# Patient Record
Sex: FEMALE | Race: WHITE | NOT HISPANIC OR LATINO | Employment: FULL TIME | URBAN - METROPOLITAN AREA
[De-identification: names, ages, dates, MRNs, and addresses within clinical notes are randomized per-mention and may not be internally consistent; named-entity substitution may affect disease eponyms.]

---

## 2018-08-30 ENCOUNTER — TRANSFERRED RECORDS (OUTPATIENT)
Dept: HEALTH INFORMATION MANAGEMENT | Facility: CLINIC | Age: 33
End: 2018-08-30

## 2018-08-30 LAB
HPV ABSTRACT: NORMAL
PAP-ABSTRACT: NORMAL

## 2018-12-05 ENCOUNTER — TRANSFERRED RECORDS (OUTPATIENT)
Dept: HEALTH INFORMATION MANAGEMENT | Facility: CLINIC | Age: 33
End: 2018-12-05

## 2018-12-05 LAB
ABO + RH BLD: NORMAL
ABO + RH BLD: NORMAL
BLD GP AB SCN SERPL QL: NEGATIVE
C TRACH DNA SPEC QL PROBE+SIG AMP: NEGATIVE
HBV SURFACE AG SERPL QL IA: NEGATIVE
HEMOGLOBIN: 12.6 G/DL (ref 11.7–15.7)
HIV 1+2 AB+HIV1 P24 AG SERPL QL IA: NEGATIVE
N GONORRHOEA DNA SPEC QL PROBE+SIG AMP: NEGATIVE
PLATELET # BLD AUTO: 290 10^9/L
RUBELLA ANTIBODY IGG QUANTITATIVE: NORMAL IU/ML
TREPONEMA ANTIBODIES: NEGATIVE

## 2019-04-01 ENCOUNTER — TELEPHONE (OUTPATIENT)
Dept: OBGYN | Facility: CLINIC | Age: 34
End: 2019-04-01

## 2019-04-01 DIAGNOSIS — Z36.9 ENCOUNTER FOR ANTENATAL SCREENING OF MOTHER: Primary | ICD-10-CM

## 2019-04-01 NOTE — TELEPHONE ENCOUNTER
Records received (in addition to the ones that have been scanned) in OB GYN, patient doesn't have any appointments scheduled will need to be accepted as a [place].

## 2019-04-08 ENCOUNTER — PRENATAL OFFICE VISIT (OUTPATIENT)
Dept: MIDWIFE SERVICES | Facility: CLINIC | Age: 34
End: 2019-04-08

## 2019-04-08 VITALS
WEIGHT: 176 LBS | SYSTOLIC BLOOD PRESSURE: 91 MMHG | DIASTOLIC BLOOD PRESSURE: 53 MMHG | HEIGHT: 68 IN | BODY MASS INDEX: 26.67 KG/M2 | HEART RATE: 66 BPM

## 2019-04-08 DIAGNOSIS — Z36.9 ENCOUNTER FOR ANTENATAL SCREENING OF MOTHER: ICD-10-CM

## 2019-04-08 DIAGNOSIS — Z34.02 ENCOUNTER FOR SUPERVISION OF NORMAL FIRST PREGNANCY IN SECOND TRIMESTER: ICD-10-CM

## 2019-04-08 DIAGNOSIS — Z34.02 ENCOUNTER FOR SUPERVISION OF NORMAL FIRST PREGNANCY IN SECOND TRIMESTER: Primary | ICD-10-CM

## 2019-04-08 PROBLEM — Z34.00 SUPERVISION OF NORMAL IUP (INTRAUTERINE PREGNANCY) IN PRIMIGRAVIDA: Status: ACTIVE | Noted: 2019-04-08

## 2019-04-08 LAB
ERYTHROCYTE [DISTWIDTH] IN BLOOD BY AUTOMATED COUNT: 13 % (ref 10–15)
GLUCOSE 1H P 50 G GLC PO SERPL-MCNC: 96 MG/DL (ref 60–129)
HCT VFR BLD AUTO: 33.7 % (ref 35–47)
HGB BLD-MCNC: 11.6 G/DL (ref 11.7–15.7)
MCH RBC QN AUTO: 33.5 PG (ref 26.5–33)
MCHC RBC AUTO-ENTMCNC: 34.4 G/DL (ref 31.5–36.5)
MCV RBC AUTO: 97 FL (ref 78–100)
PLATELET # BLD AUTO: 261 10E9/L (ref 150–450)
RBC # BLD AUTO: 3.46 10E12/L (ref 3.8–5.2)
WBC # BLD AUTO: 10.7 10E9/L (ref 4–11)

## 2019-04-08 PROCEDURE — 99207 ZZC FIRST OB VISIT: CPT | Performed by: ADVANCED PRACTICE MIDWIFE

## 2019-04-08 PROCEDURE — 36415 COLL VENOUS BLD VENIPUNCTURE: CPT | Performed by: ADVANCED PRACTICE MIDWIFE

## 2019-04-08 PROCEDURE — 85027 COMPLETE CBC AUTOMATED: CPT | Performed by: ADVANCED PRACTICE MIDWIFE

## 2019-04-08 PROCEDURE — 82950 GLUCOSE TEST: CPT | Performed by: ADVANCED PRACTICE MIDWIFE

## 2019-04-08 ASSESSMENT — ANXIETY QUESTIONNAIRES
IF YOU CHECKED OFF ANY PROBLEMS ON THIS QUESTIONNAIRE, HOW DIFFICULT HAVE THESE PROBLEMS MADE IT FOR YOU TO DO YOUR WORK, TAKE CARE OF THINGS AT HOME, OR GET ALONG WITH OTHER PEOPLE: NOT DIFFICULT AT ALL
2. NOT BEING ABLE TO STOP OR CONTROL WORRYING: NOT AT ALL
GAD7 TOTAL SCORE: 2
1. FEELING NERVOUS, ANXIOUS, OR ON EDGE: NOT AT ALL
6. BECOMING EASILY ANNOYED OR IRRITABLE: SEVERAL DAYS
5. BEING SO RESTLESS THAT IT IS HARD TO SIT STILL: NOT AT ALL
3. WORRYING TOO MUCH ABOUT DIFFERENT THINGS: NOT AT ALL
7. FEELING AFRAID AS IF SOMETHING AWFUL MIGHT HAPPEN: NOT AT ALL

## 2019-04-08 ASSESSMENT — MIFFLIN-ST. JEOR: SCORE: 1553.89

## 2019-04-08 ASSESSMENT — PATIENT HEALTH QUESTIONNAIRE - PHQ9
SUM OF ALL RESPONSES TO PHQ QUESTIONS 1-9: 2
5. POOR APPETITE OR OVEREATING: SEVERAL DAYS

## 2019-04-08 NOTE — NURSING NOTE
Please abstract the following data from this visit with this patient into the appropriate field in Epic:    Pap smear and HPV done on this date: 8/30/2019 (approximately), by this group: [facility] via [facility] Everywhere, results were NIL and negative.

## 2019-04-08 NOTE — PATIENT INSTRUCTIONS
Thank you for coming to see the Midwives at the   [facility]!      We will notify you about your labs that were drawn today once we get the results back.  If you have Avalarahart your lab results will be posted there.      Someone from the clinic will call you personally or send you a Acquaintable message with your results.      If you need any refills of medications please call your pharmacy and they will contact us.      If you have a medical emergency please call 911.      If you have any concerns about today's visit, wish to schedule another appointment, or have an urgent medical concern please call our office at [phone]. You can also make appointments through Acquaintable.      After hours you may also call the clinic number above to be connected with [facility] after hours triage nurse.  The nurse can page the midwife on call if needed. There is always a midwife on call 24 hours a day.    Prenatal Care Recommendations:  24-27 weeks: One hour diabetes test (GCT) and complete blood count       This test helps identify diabetes of pregnancy or gestational diabetes.  We also look   at the iron in your blood and how well your blood clots.    28 - 36 weeks: Tetanus shot (Tdap)       This shot helps protect you and your baby from whooping cough.    36 weeks and later: Group B Strep test (GBS)       This test helps predict if you need antibiotics in labor to prevent infection for your baby.    Anytime September to April:  Flu shot       This shot helps protect you and your family from the flu.  This is especially important during pregnancy.        The typical schedule after your first visit today you can expect:     Visit 2 - 12-16 weeks  Visit 3 - 20 weeks  Visit 4 - 24 weeks  Visit 5 - 28 weeks  Visit 6 - 30 weeks  Visit 7 - 32 weeks  Visit 8 - 34 weeks  Visit 9 - 36 weeks  Weekly after 36 weeks until delivery.        Any time during or after your pregnancy you may experience increased depression and/or mood  changes.    We are here to support you. Please contact us if you are:    Feeling anxious    Overwhelmed or sad     Trouble sleeping    Crying uncontrollably    Trouble caring for yourself or baby.    Any thoughts of hurting yourself, your baby, or anyone else    If anything comes up between your visits or you have concerns please don't hesitate to contact us.    Secure access to your medical record:  Use Shopeart (secure email communication and access to your chart) to send your primary care provider a message or make an appointment. Ask someone on your Team how to sign up for Altheos. To log on to Dennoo or for more information in Altheos please visit the website at [email or web address].       Certified Nurse Midwife (CNM) Team    DONY Forrester, DONY Pineda, LITZY, CNM, NP-BC  Jelly Rawls, PJ, APRN, CNM      Again, thank you for choosing the midwives at [facility].  We are excited to be a part of your pregnancy. Please let us know how we can best partner with you to improve your and your family's health.    Fetal Kick Counts    It is important to know when your baby's movements occur. We often get busy with work and life and do not pay close attention to their movements.        Women typically begin feeling movement between 18-22 weeks of gestation, sometimes it can be earlier or later depending on where your placenta is       Movements usually begin feeling like popping or fluttering and as the baby grows they become more pronounced    Toward the end of pregnancy as the baby gets larger they may not move as much or make as big of movements. Babies have maturing sleep cycles as well as not as much room to move and flip. If you are ever concerned about your baby's movements or have not felt the baby move for a while, we recommend you do a fetal kick count. Prior to starting your count drink a glass of water or juice and eat a snack. Then lay down on  "your side and begin to count movements.       PREECLAMPSIA SIGNS AND SYMPTOMS    Preeclampsia is a dangerous condition that some women develop in the second half of pregnancy. It can also begin after the baby is born.  Preeclampsia causes high blood pressure and can cause problems with many organ systems in your body.  It can also affect the growth of your baby. The exact cause of preeclampsia is unknown, however, there are signs and symptoms to watch for:    -A bad headache that doesn't improve with Tylenol  -Visual changes such as spots, flashes of light, blurry vision  -Pain in the upper right part of your abdomen, especially under the ribs that doesn't go away  -Nausea and/or vomiting  -Feeling extremely tired  -Yellowing of the skin and/or eyes  -Feeling \"not quite right\" or that something is wrong  -An extreme amount of swelling (some swelling in pregnancy is very normal)    If your midwife feels that you are developing preeclampsia, you will have lab tests drawn and will be monitored very closely.     If you are experiencing anyof these symptoms, call the [facility] immediately at [phone].    "

## 2019-04-08 NOTE — PROGRESS NOTES
SUBJECTIVE:     HPI:    This is a 33 year old female patient,  who presents for her first obstetrical visit. Was seen at [facility] but is looking for a more personalized experience. Is not interested in waterbirth. She presents alone to the clinic today and in good health. She denies any questions or concerns at this time.     LIBERTAD: 2019, by Ultrasound.  She is 24w5d weeks.  Her cycles are regular.  Her last menstrual period was normal.   Since her LMP, she has experienced  nausea and Diarrhea).   She denies emesis, abdominal pain, fatigue, headache, loss of appetite, vaginal discharge, dysuria, pelvic pain, urinary urgency, lightheadedness, urinary frequency, vaginal bleeding, hemorrhoids and constipation.    Additional History: Doing CBE at [facility] for a 5 week course, already has breast pump, and did a tour of "[employer].    Have you travelled during the pregnancy?Yes, If yes, where? [place] in 2018 - Low risk area; wore a lot of bug spray  Have your sexual partner(s) travelled during the pregnancy?Yes, If yes, where? [facility] in     HISTORY:   Planned Pregnancy: Yes  Marital Status: Single  Occupation: Retail-  Living in Household: Spouse    Past History:  Her past medical history   Past Medical History:   Diagnosis Date     Depressive disorder     no meds   .      She has a history of  First pregnancy    Since her last LMP she denies use of alcohol, tobacco and street drugs.    Past medical, surgical, social and family history were reviewed and updated in [employer].        Current Outpatient Medications   Medication     Prenatal Multivit-Min-Fe-FA (PRENATAL VITAMINS PO)     UNKNOWN MED DOSAGE     No current facility-administered medications for this visit.        ROS:   12 point review of systems negative other than symptoms noted below.  Gastrointestinal: Diarrhea and Nausea      OBJECTIVE:     EXAM:  BP 91/53 (BP Location: Left arm, Patient  "Position: Sitting, Cuff Size: Adult Regular)   Pulse 66   Ht 1.715 m (5' 7.5\")   Wt 79.8 kg (176 lb)   LMP 2018   BMI 27.16 kg/m   Body mass index is 27.16 kg/m .    GENERAL: healthy, alert and no distress  EYES: Eyes grossly normal to inspection, PERRL and conjunctivae and sclerae normal  NECK: no adenopathy, no asymmetry, masses, or scars and thyroid normal to palpation  RESP: lungs clear to auscultation - no rales, rhonchi or wheezes  CV: regular rate and rhythm, normal S1 S2, no S3 or S4, no murmur, click or rub, no peripheral edema and peripheral pulses strong  MS: no gross musculoskeletal defects noted, no edema  SKIN: no suspicious lesions or rashes  NEURO: Normal strength and tone, mentation intact and speech normal  PSYCH: mentation appears normal, affect normal/bright    ASSESSMENT/PLAN:       ICD-10-CM    1. Encounter for supervision of normal first pregnancy in second trimester Z34.02        33 year old , 24w5d weeks of pregnancy with LIBERTAD of 2019, by Ultrasound    Discussed as follows:Childbirth classes already planned at [facility], tour completed at [facility],  plans on breastfeeding and has pump already, plan for Tdap next visit    Genetic Testing reviewed-Completed at [facility] .     COUNSELING    Instructed on use of triage nurse line and contacting the on call CNM after hours in an emergency.     Reviewed CNM philosophy, call schedule for labor and delivery, and FSH for delivery    1st OB handout given outlining appointment spacing and CNM information    Reviewed exercise and nutrition    Recommend to gain 25-35 pounds with her pregnancy.    Discussed OTC medications. OB med list given    Travel precautions discussed, no air travel after 36 weeks and Zika Virus discussed    GCT and CBC done today and WNL    Tdap next visit    Will return to the clinic in 4 weeks for her next routine prenatal check.  Will call to be seen sooner if problems " arise.      LITZY Fisher CNM    Important Information for Provider: Transfer of care at 24 weeks.     Prenatal OB Questionnaire      Allergies as of 4/8/2019:    Allergies as of 04/08/2019 - Reviewed 04/08/2019   Allergen Reaction Noted     Amoxicillin  03/01/2011       Current medications are:  Current Outpatient Medications   Medication Sig Dispense Refill     Prenatal Multivit-Min-Fe-FA (PRENATAL VITAMINS PO) Take 1 tablet by mouth       UNKNOWN MED DOSAGE BCP           Early ultrasound screening tool:    Does patient have irregular periods?  N/A  Did patient use hormonal birth control in the three months prior to positive urine pregnancy test? N/A  Is the patient breastfeeding?  N/A  Is the patient 10 weeks or greater at time of education visit?  Yes    Transfer of Care at 24w5d

## 2019-04-09 ASSESSMENT — ANXIETY QUESTIONNAIRES: GAD7 TOTAL SCORE: 2

## 2019-05-08 ENCOUNTER — PRENATAL OFFICE VISIT (OUTPATIENT)
Dept: MIDWIFE SERVICES | Facility: CLINIC | Age: 34
End: 2019-05-08
Payer: COMMERCIAL

## 2019-05-08 VITALS — BODY MASS INDEX: 27.96 KG/M2 | WEIGHT: 181.2 LBS | SYSTOLIC BLOOD PRESSURE: 112 MMHG | DIASTOLIC BLOOD PRESSURE: 78 MMHG

## 2019-05-08 DIAGNOSIS — Z23 NEED FOR TDAP VACCINATION: Primary | ICD-10-CM

## 2019-05-08 DIAGNOSIS — Z34.03 ENCOUNTER FOR SUPERVISION OF NORMAL FIRST PREGNANCY IN THIRD TRIMESTER: ICD-10-CM

## 2019-05-08 PROCEDURE — 99207 ZZC PRENATAL VISIT: CPT | Performed by: ADVANCED PRACTICE MIDWIFE

## 2019-05-08 PROCEDURE — 90715 TDAP VACCINE 7 YRS/> IM: CPT | Performed by: ADVANCED PRACTICE MIDWIFE

## 2019-05-08 PROCEDURE — 90471 IMMUNIZATION ADMIN: CPT | Performed by: ADVANCED PRACTICE MIDWIFE

## 2019-05-08 NOTE — PROGRESS NOTES
Feels well today. Here with Eddie and her mother.   Fetal movement: positive, denies loss of fluid/vb/contractions  Having lower back pain and hip pain. Reviewed exercises/ stretching. If it persists we can rx a belt.   Tdap given: Yes  Rhogam: n/a O+  Hep C drawn: No  Water birth consent signed: Not interested    Reviewed PTL precautions and S&S of PIH, patient verbalizes understanding and what to report  Hospital Registration reminder-online  Return to clinic 2 weeks    Jelly Rawls, PJ, APRN, CNM

## 2019-05-08 NOTE — PATIENT INSTRUCTIONS
"PREECLAMPSIA SIGNS AND SYMPTOMS    Preeclampsia is a dangerous condition that some women develop in the second half of pregnancy. It can also begin after the baby is born.  Preeclampsia causes high blood pressure and can cause problems with many organ systems in your body.  It can also affect the growth of your baby. The exact cause of preeclampsia is unknown, however, there are signs and symptoms to watch for:    -A bad headache that doesn't improve with Tylenol  -Visual changes such as spots, flashes of light, blurry vision  -Pain in the upper right part of your abdomen, especially under the ribs that doesn't go away  -Nausea and/or vomiting  -Feeling extremely tired  -Yellowing of the skin and/or eyes  -Feeling \"not quite right\" or that something is wrong  -An extreme amount of swelling (some swelling in pregnancy is very normal)    If your midwife feels that you are developing preeclampsia, you will have lab tests drawn and will be monitored very closely.     If you are experiencing anyof these symptoms, call the [facility] immediately at [phone].    SIGNS OF  LABOR    Labor is  if it happens more than three weeks before your due date.    It can be hard to know if you are in labor, since the symptoms can be like the normal feelings of pregnancy.  Often, the only difference is the symptoms increase or they don't go away.     Signs of  labor can include:      Contractions which can feel like period cramps or gas pain.  You may feel it in the lower part of your abdomen, in your back, or as a pressure feeling in your bottom.  It is often regular, coming every 5 or 10 minutes, and  lasting about 30-60 seconds. Some contractions are normal during pregnancy (Evan beltrán contractions) but if you are feeling more than 5-6 in one hour that is NOT normal    If this occurs empty your bladder, then drink 2-3 glasses of water, eat a snack, and lay down on your left side. Put your " hand on your abdomen to count the contractions.  If after one hour of resting you have still had 5-6 contractions call your clinic right away.      If you feel a pop, gush, or trickle of fluid it may mean that your bag of water has broken and you should contact the clinic       You may also experience loose stools and/or rectal pressure       Listen to your body, if something doesn't seem right please call us at the clinic    Risk Factors      Previous  delivery    Bacterial Vaginosis- if you notice a fishy smell to your discharge or experience vaginal itching/discomfort you should be evaluated for infection    Smoking    Drug abuse    Adolescent (teen) pregnancy or advanced maternal age (AMA) age 35 and over    Dehydration (this may not cause  labor but it can cause contractions)    If you think you are in  labor we may do some lab testing in the clinic or send you to the hospital for evaluation    Please call us if you are concerned you are in  labor.    [facility]  [phone]

## 2019-05-17 ENCOUNTER — TELEPHONE (OUTPATIENT)
Dept: OBGYN | Facility: CLINIC | Age: 34
End: 2019-05-17

## 2019-05-17 NOTE — TELEPHONE ENCOUNTER
30w2d, LIBERTAD 9/26/18. Pt started seeing stars today for about 30 sec. No further vision changes. Pt is feeling well. Pt has been having HA on and off. No blurred vision, epigastric pain. No nausea. Pt states FM is present but less the last two days. Has felt movement.  Pt denies contractions. Pt BP in clinic 112/78 and 91/53. Reviewed with Geovanna Sandy. Pt to monitor for now and call back if happens again. Reviewed Preeclampsia signs and symptoms with pt. Pt will come to the clinic Monday for BP check.

## 2019-05-20 ENCOUNTER — ALLIED HEALTH/NURSE VISIT (OUTPATIENT)
Dept: NURSING | Facility: CLINIC | Age: 34
End: 2019-05-20
Payer: COMMERCIAL

## 2019-05-20 VITALS — DIASTOLIC BLOOD PRESSURE: 62 MMHG | HEART RATE: 73 BPM | SYSTOLIC BLOOD PRESSURE: 102 MMHG

## 2019-05-20 PROCEDURE — 99211 OFF/OP EST MAY X REQ PHY/QHP: CPT

## 2019-05-20 NOTE — NURSING NOTE
30w5d. Pt called with vision change last Friday. Saw stars for 30 sec. Pt here for BP check. /63, pulse 73. Denies any further vision changes. Denies HA, epigastric pain or dizziness. No edema in legs or feet. States left leg does swell by end of work day.  FM present. Denies contractions, vag bleeding, or LOF. Reviewed signs and symptoms of pre-eclampsia and when to call.Pt had no questions. Discharged to home.   Next OB appt 5/23/19.

## 2019-05-23 ENCOUNTER — PRENATAL OFFICE VISIT (OUTPATIENT)
Dept: MIDWIFE SERVICES | Facility: CLINIC | Age: 34
End: 2019-05-23
Payer: COMMERCIAL

## 2019-05-23 VITALS — BODY MASS INDEX: 28.24 KG/M2 | DIASTOLIC BLOOD PRESSURE: 78 MMHG | WEIGHT: 183 LBS | SYSTOLIC BLOOD PRESSURE: 116 MMHG

## 2019-05-23 DIAGNOSIS — Z34.03 ENCOUNTER FOR SUPERVISION OF NORMAL FIRST PREGNANCY IN THIRD TRIMESTER: Primary | ICD-10-CM

## 2019-05-23 PROCEDURE — 99207 ZZC PRENATAL VISIT: CPT | Performed by: ADVANCED PRACTICE MIDWIFE

## 2019-05-23 NOTE — PATIENT INSTRUCTIONS

## 2019-05-23 NOTE — PROGRESS NOTES
Feels well overall. Here alone today.  Fetal Movement: positive, denies loss of fluid/vb, no contractions  Fetal kick counts/movement reviewed  Reviewed PTL precautions and s/sx of preeclampsia; denies any S&S and aware of what to report     Peds chosen: Yes - in [place]    Plans to breastfeed Yes  Breastfeeding class planned combined with a birthing class. Also she is friends with a lactation consultant.     Return to clinic 2 weeks    Jelly Rawls, PJ, APRN, CNM

## 2019-06-06 ENCOUNTER — PRENATAL OFFICE VISIT (OUTPATIENT)
Dept: MIDWIFE SERVICES | Facility: CLINIC | Age: 34
End: 2019-06-06
Payer: COMMERCIAL

## 2019-06-06 VITALS — BODY MASS INDEX: 27.78 KG/M2 | SYSTOLIC BLOOD PRESSURE: 94 MMHG | WEIGHT: 180 LBS | DIASTOLIC BLOOD PRESSURE: 64 MMHG

## 2019-06-06 DIAGNOSIS — Z34.03 ENCOUNTER FOR SUPERVISION OF NORMAL FIRST PREGNANCY IN THIRD TRIMESTER: ICD-10-CM

## 2019-06-06 PROCEDURE — 99207 ZZC PRENATAL VISIT: CPT | Performed by: NURSE PRACTITIONER

## 2019-06-06 NOTE — PATIENT INSTRUCTIONS

## 2019-06-06 NOTE — PROGRESS NOTES
Feels well.  No concerns at this time.   Fetal Movement: positive, denies loss of fluid/vb, no contractions  Fetal kick counts/movement reviewed  Reviewed PTL precautions and s/sx of preeclampsia; denies any S&S and aware of what to report     Peds chosen: Not asked    Plans to breastfeed Yes    Return to clinic 2 weeks  **ask about peds provider at next visit    Janis MCGHEE CNM

## 2019-06-19 ENCOUNTER — PRENATAL OFFICE VISIT (OUTPATIENT)
Dept: MIDWIFE SERVICES | Facility: CLINIC | Age: 34
End: 2019-06-19
Payer: COMMERCIAL

## 2019-06-19 VITALS — SYSTOLIC BLOOD PRESSURE: 108 MMHG | BODY MASS INDEX: 29.32 KG/M2 | WEIGHT: 190 LBS | DIASTOLIC BLOOD PRESSURE: 72 MMHG

## 2019-06-19 DIAGNOSIS — Z34.03 ENCOUNTER FOR SUPERVISION OF NORMAL FIRST PREGNANCY IN THIRD TRIMESTER: Primary | ICD-10-CM

## 2019-06-19 PROCEDURE — 99207 ZZC PRENATAL VISIT: CPT | Performed by: ADVANCED PRACTICE MIDWIFE

## 2019-06-19 NOTE — PATIENT INSTRUCTIONS
"PREECLAMPSIA SIGNS AND SYMPTOMS    Preeclampsia is a dangerous condition that some women develop in the second half of pregnancy. It can also begin after the baby is born.  Preeclampsia causes high blood pressure and can cause problems with many organ systems in your body.  It can also affect the growth of your baby. The exact cause of preeclampsia is unknown, however, there are signs and symptoms to watch for:    -A bad headache that doesn't improve with Tylenol  -Visual changes such as spots, flashes of light, blurry vision  -Pain in the upper right part of your abdomen, especially under the ribs that doesn't go away  -Nausea and/or vomiting  -Feeling extremely tired  -Yellowing of the skin and/or eyes  -Feeling \"not quite right\" or that something is wrong  -An extreme amount of swelling (some swelling in pregnancy is very normal)    If your midwife feels that you are developing preeclampsia, you will have lab tests drawn and will be monitored very closely.     If you are experiencing anyof these symptoms, call the [facility] immediately at [phone].    Fetal Kick Counts    It is important to know when your baby's movements occur. We often get busy with work and life and do not pay close attention to their movements.        Women typically begin feeling movement between 18-22 weeks of gestation, sometimes it can be earlier or later depending on where your placenta is       Movements usually begin feeling like popping or fluttering and as the baby grows they become more pronounced    Toward the end of pregnancy as the baby gets larger they may not move as much or make as big of movements. Babies have maturing sleep cycles as well as not as much room to move and flip. If you are ever concerned about your baby's movements or have not felt the baby move for a while, we recommend you do a fetal kick count. Prior to starting your count drink a glass of water or juice and eat a snack. Then lay down on " your side and begin to count movements.     How to do a Fetal Kick Counts    There are many different ways to monitor your baby's movements. Movements can range from large jabs to small kicks, or wiggles.  Hiccups count!      Count 10 movements in 2 hours when resting and focusing    Count 10 movements in 12 hours when doing normal activity    We recommend that if movements occur but seem decreased that you should be seen in the clinic or hospital for evaluation within 12 hours. If fetal movement is absent or fetal kick counts are low please contact us right away.    If you ever have any concerns about your baby's movements DO NOT HESITATE to call us, we are here for you!    [facility]  [phone]    SIGNS OF  LABOR    Labor is  if it happens more than three weeks before your due date.    It can be hard to know if you are in labor, since the symptoms can be like the normal feelings of pregnancy.  Often, the only difference is the symptoms increase or they don't go away.     Signs of  labor can include:      Contractions which can feel like period cramps or gas pain.  You may feel it in the lower part of your abdomen, in your back, or as a pressure feeling in your bottom.  It is often regular, coming every 5 or 10 minutes, and  lasting about 30-60 seconds. Some contractions are normal during pregnancy ([place] beltrán contractions) but if you are feeling more than 5-6 in one hour that is NOT normal    If this occurs empty your bladder, then drink 2-3 glasses of water, eat a snack, and lay down on your left side. Put your hand on your abdomen to count the contractions.  If after one hour of resting you have still had 5-6 contractions call your clinic right away.      If you feel a pop, gush, or trickle of fluid it may mean that your bag of water has broken and you should contact the clinic       You may also experience loose stools and/or rectal pressure       Listen to your body,  if something doesn't seem right please call us at the clinic    Risk Factors      Previous  delivery    Bacterial Vaginosis- if you notice a fishy smell to your discharge or experience vaginal itching/discomfort you should be evaluated for infection    Smoking    Drug abuse    Adolescent (teen) pregnancy or advanced maternal age (AMA) age 35 and over    Dehydration (this may not cause  labor but it can cause contractions)    If you think you are in  labor we may do some lab testing in the clinic or send you to the hospital for evaluation    Please call us if you are concerned you are in  labor.    [facility]  [phone]    GROUP B STREP    Group B Strep (GBS) is a common bacteria that is sometimes found in the vagina, urinary tract or rectum.  It is not harmful typically to adults but can cause serious illness in newborns.  It occasionally is passed from mother to baby during birth.   It is important to test in pregnancy.  When a woman is found to be positive for GBS, either at the first prenatal visit or by taking a culture at 36 weeks, treatment will be offered to reduce the chance of spreading the bacteria to the baby.       Treatment consists of either oral antibiotics early in pregnancy or antibiotics given by IV during labor if testing is positive at 36 weeks.      Even without treatment the baby rarely (1-2% of the time) gets infected.  With treatment the baby almost never gets infected.       There really isn't anything you can do to keep from getting or being positive for GBS.  It isn't sexually transmitted and there are no symptoms if you are positive.       Your midwife will discuss your results with you and make recommendations for treatment.                                                   Infant Car Seat Safety  [place] law requires that all infants must be secured into a car seat while in a car.   General Car Seat Guidelines:    Your car seat should be  no more than six years old and should never have been in an accident.  Always know the history of your car seat.    Car seats should be installed in the back seat and should be rear-facing until the child is at least one year old (two years old, preferably), or at least 20 pounds. Follow your car seat 's instructions.    The straps on the car seat should be snug enough that you should not be able to pinch up slack with your fingers.    Never dress your baby in thick clothing, coats or blankets while riding in a car seat.  Secure the baby into the seat wearing clothing of normal thickness and then cover baby s lap with a blanket OVER the car seat straps, if needed.  Car Seat Clinics:    Schedule an appointment to have a professional check the installation of your car seat before your baby is born.  Go to:   https://dps.[place].gov/divisions/ots/child-passenger-safety/Pages/car-seat-checks.aspx         and click on your county.    Sign up for email alerts on child restraint/car seat recalls at:     http://www-cara.nhtsa.dot.gov/subscriptions/index.cfm     Find more information on car seat safety at:     http://www.safercar.gov/parents/CarSeats/Car-Seat-Safety.htm

## 2019-06-25 ENCOUNTER — PRENATAL OFFICE VISIT (OUTPATIENT)
Dept: MIDWIFE SERVICES | Facility: CLINIC | Age: 34
End: 2019-06-25
Payer: COMMERCIAL

## 2019-06-25 VITALS — WEIGHT: 189.4 LBS | BODY MASS INDEX: 29.23 KG/M2 | DIASTOLIC BLOOD PRESSURE: 56 MMHG | SYSTOLIC BLOOD PRESSURE: 110 MMHG

## 2019-06-25 DIAGNOSIS — Z34.03 ENCOUNTER FOR SUPERVISION OF NORMAL FIRST PREGNANCY IN THIRD TRIMESTER: Primary | ICD-10-CM

## 2019-06-25 DIAGNOSIS — Z36.85 SCREENING, ANTENATAL, FOR STREPTOCOCCUS B: ICD-10-CM

## 2019-06-25 DIAGNOSIS — H53.9 VISUAL DISTURBANCES: ICD-10-CM

## 2019-06-25 LAB
CREAT UR-MCNC: 74 MG/DL
ERYTHROCYTE [DISTWIDTH] IN BLOOD BY AUTOMATED COUNT: 12.8 % (ref 10–15)
HCT VFR BLD AUTO: 37.2 % (ref 35–47)
HGB BLD-MCNC: 12.7 G/DL (ref 11.7–15.7)
MCH RBC QN AUTO: 33.3 PG (ref 26.5–33)
MCHC RBC AUTO-ENTMCNC: 34.1 G/DL (ref 31.5–36.5)
MCV RBC AUTO: 98 FL (ref 78–100)
PLATELET # BLD AUTO: 237 10E9/L (ref 150–450)
PROT UR-MCNC: 0.12 G/L
PROT/CREAT 24H UR: 0.16 G/G CR (ref 0–0.2)
RBC # BLD AUTO: 3.81 10E12/L (ref 3.8–5.2)
WBC # BLD AUTO: 14.6 10E9/L (ref 4–11)

## 2019-06-25 PROCEDURE — 36415 COLL VENOUS BLD VENIPUNCTURE: CPT | Performed by: ADVANCED PRACTICE MIDWIFE

## 2019-06-25 PROCEDURE — 80053 COMPREHEN METABOLIC PANEL: CPT | Performed by: ADVANCED PRACTICE MIDWIFE

## 2019-06-25 PROCEDURE — 87653 STREP B DNA AMP PROBE: CPT | Performed by: ADVANCED PRACTICE MIDWIFE

## 2019-06-25 PROCEDURE — 85027 COMPLETE CBC AUTOMATED: CPT | Performed by: ADVANCED PRACTICE MIDWIFE

## 2019-06-25 PROCEDURE — 84156 ASSAY OF PROTEIN URINE: CPT | Performed by: ADVANCED PRACTICE MIDWIFE

## 2019-06-25 PROCEDURE — 99207 ZZC PRENATAL VISIT: CPT | Performed by: ADVANCED PRACTICE MIDWIFE

## 2019-06-25 NOTE — PATIENT INSTRUCTIONS
"Labor Instructions for Midwife Patients    When to call:  Both during and after office hours call  [phone]. There is a triage RN to take your calls and answer your questions 24 hours a day.  If she cannot answer your question she will page the midwife on call for you.    When to call:  Call anytime you have important concerns about you or your baby.     Call if:    You are having contractions at regular intervals about 5-6 minutes apart lasting 30-60 seconds and becoming increasingly more intense     You have an uncontrollable gush of fluid from your vagina or feel a pop and gush like your water has broken    You have HEAVY bleeding, like heavy period, blood running down your legs, or  soaking a pad.     Some bleeding after a pelvic exam, after intercourse, or in labor when your cervix is dilating is normal and is referred to as \"bloody show\"    You have severe, continuous back or abdominal pain    You feel it is time to go to the hospital    If this is your first labor, call when contractions are very intense and have been about every 3-4 minutes for about an hour    If it is your second labor or more, call when contractions are strong and about every 3-5 minutes or sooner depending on your level of discomfort.     Keep in mind we are always here for you! If you have questions, concerns please don't hesitate to call us.     What to eat/drink in labor: Drink plenty of fluid (water most importantly, juice, soda or tea without caffeine). Eat rice, pasta, soup, cereal, bread/toast, and fruit. Avoid dairy and greasy food as they are difficult to digest and you may experience some nausea during labor.    Comfort measures:    Baths and showers (ok even with ruptured membranes, it may temporarily slow contractions if you are still in the early stage of labor)    Warm/hot packs for back pain or discomfort    Back, belly, or thigh massages    Standing, rocking, walking, leaning over bed or tables, side-lying and " sleeping    Miscellaneous:     Contractions are timed from the beginning of one to the beginning of the next    Try hard to sleep during the early stage of labor when you are not that uncomfortable. Timing of contractions at this point is not important    Even if you cannot sleep, resting in bed or on the couch can help you maintain your energy for labor    When you arrive at the hospital the nurse will check your baby's heartbeat, check your cervix, and will call us. The midwife on call will come in and be with you when you are in active labor    After hours you need to enter the hospital through the emergency room    Fetal Kick Counts    It is important to know when your baby's movements occur. We often get busy with work and life and do not pay close attention to their movements.        Women typically begin feeling movement between 18-22 weeks of gestation, sometimes it can be earlier or later depending on where your placenta is       Movements usually begin feeling like popping or fluttering and as the baby grows they become more pronounced    Toward the end of pregnancy as the baby gets larger they may not move as much or make as big of movements. Babies have maturing sleep cycles as well as not as much room to move and flip. If you are ever concerned about your baby's movements or have not felt the baby move for a while, we recommend you do a fetal kick count. Prior to starting your count drink a glass of water or juice and eat a snack. Then lay down on your side and begin to count movements.     How to do a Fetal Kick Counts    There are many different ways to monitor your baby's movements. Movements can range from large jabs to small kicks, or wiggles.  Hiccups count!      Count 10 movements in 2 hours when resting and focusing    Count 10 movements in 12 hours when doing normal activity    We recommend that if movements occur but seem decreased that you should be seen in the clinic or hospital for  "evaluation within 12 hours. If fetal movement is absent or fetal kick counts are low please contact us right away.    If you ever have any concerns about your baby's movements DO NOT HESITATE to call us, we are here for you!    [facility]  [phone]        PREECLAMPSIA SIGNS AND SYMPTOMS    Preeclampsia is a dangerous condition that some women develop in the second half of pregnancy. It can also begin after the baby is born.  Preeclampsia causes high blood pressure and can cause problems with many organ systems in your body.  It can also affect the growth of your baby. The exact cause of preeclampsia is unknown, however, there are signs and symptoms to watch for:    -A bad headache that doesn't improve with Tylenol  -Visual changes such as spots, flashes of light, blurry vision  -Pain in the upper right part of your abdomen, especially under the ribs that doesn't go away  -Nausea and/or vomiting  -Feeling extremely tired  -Yellowing of the skin and/or eyes  -Feeling \"not quite right\" or that something is wrong  -An extreme amount of swelling (some swelling in pregnancy is very normal)    If your midwife feels that you are developing preeclampsia, you will have lab tests drawn and will be monitored very closely.     If you are experiencing anyof these symptoms, call the [facility] immediately at [phone].    "

## 2019-06-25 NOTE — PROGRESS NOTES
Feels well overall. Here alone today.   Fetal movement: positive  Denies bleeding/lof, some irregular contractions  GBS swab done today  Bedside ultrasound confirmed vertex position.  Labor instructions given  Labor preferences/birth plan reviewed: she is assuming she will use pain medication. We discussed early labor and when to call.     Warning signs reviewed  Patient has had some floaters on one occasion and a time when she felt her vision was clouded at in the lower half, but was able to see in her upper hemisphere of vision. Light headaches have occurred that have not even needed Tylenol. We discussed reporting the vision when it occurs vs waiting to the prenatal appointment. Labs drawn today.    Return to clinic 1 week    Jelly Rawls, PJ, APRN, CNM

## 2019-06-26 ENCOUNTER — TELEPHONE (OUTPATIENT)
Dept: OBGYN | Facility: CLINIC | Age: 34
End: 2019-06-26

## 2019-06-26 LAB
ALBUMIN SERPL-MCNC: 3 G/DL (ref 3.4–5)
ALP SERPL-CCNC: 130 U/L (ref 40–150)
ALT SERPL W P-5'-P-CCNC: 25 U/L (ref 0–50)
ANION GAP SERPL CALCULATED.3IONS-SCNC: 10 MMOL/L (ref 3–14)
AST SERPL W P-5'-P-CCNC: 21 U/L (ref 0–45)
BILIRUB SERPL-MCNC: 0.3 MG/DL (ref 0.2–1.3)
BUN SERPL-MCNC: 8 MG/DL (ref 7–30)
CALCIUM SERPL-MCNC: 8.8 MG/DL (ref 8.5–10.1)
CHLORIDE SERPL-SCNC: 106 MMOL/L (ref 94–109)
CO2 SERPL-SCNC: 21 MMOL/L (ref 20–32)
CREAT SERPL-MCNC: 0.7 MG/DL (ref 0.52–1.04)
GFR SERPL CREATININE-BSD FRML MDRD: >90 ML/MIN/{1.73_M2}
GLUCOSE SERPL-MCNC: 35 MG/DL (ref 70–99)
GP B STREP DNA SPEC QL NAA+PROBE: NEGATIVE
POTASSIUM SERPL-SCNC: 4.1 MMOL/L (ref 3.4–5.3)
PROT SERPL-MCNC: 6.6 G/DL (ref 6.8–8.8)
SODIUM SERPL-SCNC: 137 MMOL/L (ref 133–144)
SPECIMEN SOURCE: NORMAL

## 2019-06-26 NOTE — TELEPHONE ENCOUNTER
Left VM message for patient to call clinic for lab results.          [facility] called back, discussed lab results.  Labs negative for preeclampsia, GBS neg.  Discussed critical low glucose, she states that she had not had breakfast or anything to eat since the night before.  She denies vision changes, dizziness, any other concerning signs and symptoms.  Counseled on importance of eating meals at regular times and encouraged snacking in between if needed.  Counseled on signs and symptoms preeclampsia.      Verbalized understanding, all questions answered.

## 2019-07-02 ENCOUNTER — PRENATAL OFFICE VISIT (OUTPATIENT)
Dept: MIDWIFE SERVICES | Facility: CLINIC | Age: 34
End: 2019-07-02
Payer: COMMERCIAL

## 2019-07-02 VITALS — DIASTOLIC BLOOD PRESSURE: 64 MMHG | BODY MASS INDEX: 29.63 KG/M2 | WEIGHT: 192 LBS | SYSTOLIC BLOOD PRESSURE: 118 MMHG

## 2019-07-02 DIAGNOSIS — Z34.03 ENCOUNTER FOR SUPERVISION OF NORMAL FIRST PREGNANCY IN THIRD TRIMESTER: Primary | ICD-10-CM

## 2019-07-02 PROCEDURE — 99207 ZZC PRENATAL VISIT: CPT | Performed by: ADVANCED PRACTICE MIDWIFE

## 2019-07-02 NOTE — PROGRESS NOTES
Feels well overall. Here with Eddie.   Fetal movement: positive  Denies vaginal bleeding/lof, non-painful, irregular contractions  Warning signs reviewed  Labor signs and symptoms discussed, aware of numbers to call  Denies s/sx of preeclampsia and aware of what to report  Short term disability paperwork filled out and in my basket  [place]     Return to clinic 1 week    Jelly Rawls, PJ, APRN, CNM

## 2019-07-02 NOTE — PATIENT INSTRUCTIONS
"Labor Instructions for Midwife Patients    When to call:  Both during and after office hours call  [phone]. There is a triage RN to take your calls and answer your questions 24 hours a day.  If she cannot answer your question she will page the midwife on call for you.    When to call:  Call anytime you have important concerns about you or your baby.     Call if:    You are having contractions at regular intervals about 5-6 minutes apart lasting 30-60 seconds and becoming increasingly more intense     You have an uncontrollable gush of fluid from your vagina or feel a pop and gush like your water has broken    You have HEAVY bleeding, like heavy period, blood running down your legs, or  soaking a pad.     Some bleeding after a pelvic exam, after intercourse, or in labor when your cervix is dilating is normal and is referred to as \"bloody show\"    You have severe, continuous back or abdominal pain    You feel it is time to go to the hospital    If this is your first labor, call when contractions are very intense and have been about every 3-4 minutes for about an hour    If it is your second labor or more, call when contractions are strong and about every 3-5 minutes or sooner depending on your level of discomfort.     Keep in mind we are always here for you! If you have questions, concerns please don't hesitate to call us.     What to eat/drink in labor: Drink plenty of fluid (water most importantly, juice, soda or tea without caffeine). Eat rice, pasta, soup, cereal, bread/toast, and fruit. Avoid dairy and greasy food as they are difficult to digest and you may experience some nausea during labor.    Comfort measures:    Baths and showers (ok even with ruptured membranes, it may temporarily slow contractions if you are still in the early stage of labor)    Warm/hot packs for back pain or discomfort    Back, belly, or thigh massages    Standing, rocking, walking, leaning over bed or tables, side-lying and " "sleeping    Miscellaneous:     Contractions are timed from the beginning of one to the beginning of the next    Try hard to sleep during the early stage of labor when you are not that uncomfortable. Timing of contractions at this point is not important    Even if you cannot sleep, resting in bed or on the couch can help you maintain your energy for labor    When you arrive at the hospital the nurse will check your baby's heartbeat, check your cervix, and will call us. The midwife on call will come in and be with you when you are in active labor    After hours you need to enter the hospital through the emergency room    PREECLAMPSIA SIGNS AND SYMPTOMS    Preeclampsia is a dangerous condition that some women develop in the second half of pregnancy. It can also begin after the baby is born.  Preeclampsia causes high blood pressure and can cause problems with many organ systems in your body.  It can also affect the growth of your baby. The exact cause of preeclampsia is unknown, however, there are signs and symptoms to watch for:    -A bad headache that doesn't improve with Tylenol  -Visual changes such as spots, flashes of light, blurry vision  -Pain in the upper right part of your abdomen, especially under the ribs that doesn't go away  -Nausea and/or vomiting  -Feeling extremely tired  -Yellowing of the skin and/or eyes  -Feeling \"not quite right\" or that something is wrong  -An extreme amount of swelling (some swelling in pregnancy is very normal)    If your midwife feels that you are developing preeclampsia, you will have lab tests drawn and will be monitored very closely.     If you are experiencing anyof these symptoms, call the [facility] immediately at [phone].    NATURAL LABOR PREPARATION    So, you're getting closer and closer to your due date and wondering what you can do to help get your body ready for labor.   Here are some natural methods you can try:    NOTE: DO NOT begin any of the " following prior to 36 completed weeks of pregnancy!    Evening Primrose Oil: Take 1000mg by mouth three times per day. This encourages the cervix to ripen. Cervical ripening is when your cervix becomes more soft and stretchy to get ready for dilation and effacement.     Red Raspberry Leaf Tea: Red raspberry tea (get it at a Co-op or in the grocery store). Drink three cups per day (hot or cold). This can help to prepare the uterine muscles for labor.    Fetal Kick Counts    It is important to know when your baby's movements occur. We often get busy with work and life and do not pay close attention to their movements.        Women typically begin feeling movement between 18-22 weeks of gestation, sometimes it can be earlier or later depending on where your placenta is       Movements usually begin feeling like popping or fluttering and as the baby grows they become more pronounced    Toward the end of pregnancy as the baby gets larger they may not move as much or make as big of movements. Babies have maturing sleep cycles as well as not as much room to move and flip. If you are ever concerned about your baby's movements or have not felt the baby move for a while, we recommend you do a fetal kick count. Prior to starting your count drink a glass of water or juice and eat a snack. Then lay down on your side and begin to count movements.     How to do a Fetal Kick Counts    There are many different ways to monitor your baby's movements. Movements can range from large jabs to small kicks, or wiggles.  Hiccups count!      Count 10 movements in 2 hours when resting and focusing    Count 10 movements in 12 hours when doing normal activity    We recommend that if movements occur but seem decreased that you should be seen in the clinic or hospital for evaluation within 12 hours. If fetal movement is absent or fetal kick counts are low please contact us right away.    If you ever have any concerns about your baby's movements DO  NOT HESITATE to call us, we are here for you!    [facility]  [phone]

## 2019-07-08 NOTE — PROGRESS NOTES
Feels well  Fetal movement: positive  Denies vaginal bleeding/lof, no contractions  Warning signs reviewed   Labor signs and symptoms discussed, aware of numbers to call  Denies s/sx of pre-eclampsia and aware of what to report    Return to clinic 1 week    Geovanna MCGHEE CNM

## 2019-07-09 ENCOUNTER — PRENATAL OFFICE VISIT (OUTPATIENT)
Dept: MIDWIFE SERVICES | Facility: CLINIC | Age: 34
End: 2019-07-09
Payer: COMMERCIAL

## 2019-07-09 VITALS — BODY MASS INDEX: 30.09 KG/M2 | SYSTOLIC BLOOD PRESSURE: 112 MMHG | DIASTOLIC BLOOD PRESSURE: 54 MMHG | WEIGHT: 195 LBS

## 2019-07-09 DIAGNOSIS — Z34.03 ENCOUNTER FOR SUPERVISION OF NORMAL FIRST PREGNANCY IN THIRD TRIMESTER: Primary | ICD-10-CM

## 2019-07-09 PROCEDURE — 99207 ZZC PRENATAL VISIT: CPT | Performed by: ADVANCED PRACTICE MIDWIFE

## 2019-07-09 NOTE — PATIENT INSTRUCTIONS
"Labor Instructions for Midwife Patients    When to call:  Both during and after office hours call  [phone]. There is a triage RN to take your calls and answer your questions 24 hours a day.  If she cannot answer your question she will page the midwife on call for you.    When to call:  Call anytime you have important concerns about you or your baby.     Call if:    You are having contractions at regular intervals about 5-6 minutes apart lasting 30-60 seconds and becoming increasingly more intense     You have an uncontrollable gush of fluid from your vagina or feel a pop and gush like your water has broken    You have HEAVY bleeding, like heavy period, blood running down your legs, or  soaking a pad.     Some bleeding after a pelvic exam, after intercourse, or in labor when your cervix is dilating is normal and is referred to as \"bloody show\"    You have severe, continuous back or abdominal pain    You feel it is time to go to the hospital    If this is your first labor, call when contractions are very intense and have been about every 3-4 minutes for about an hour    If it is your second labor or more, call when contractions are strong and about every 3-5 minutes or sooner depending on your level of discomfort.     Keep in mind we are always here for you! If you have questions, concerns please don't hesitate to call us.     What to eat/drink in labor: Drink plenty of fluid (water most importantly, juice, soda or tea without caffeine). Eat rice, pasta, soup, cereal, bread/toast, and fruit. Avoid dairy and greasy food as they are difficult to digest and you may experience some nausea during labor.    Comfort measures:    Baths and showers (ok even with ruptured membranes, it may temporarily slow contractions if you are still in the early stage of labor)    Warm/hot packs for back pain or discomfort    Back, belly, or thigh massages    Standing, rocking, walking, leaning over bed or tables, side-lying and " sleeping    Miscellaneous:     Contractions are timed from the beginning of one to the beginning of the next    Try hard to sleep during the early stage of labor when you are not that uncomfortable. Timing of contractions at this point is not important    Even if you cannot sleep, resting in bed or on the couch can help you maintain your energy for labor    When you arrive at the hospital the nurse will check your baby's heartbeat, check your cervix, and will call us. The midwife on call will come in and be with you when you are in active labor    After hours you need to enter the hospital through the emergency room        NATURAL LABOR PREPARATION    So, you're getting closer and closer to your due date and wondering what you can do to help get your body ready for labor.   Here are some natural methods you can try:    NOTE: DO NOT begin any of the following prior to 36 completed weeks of pregnancy!    Evening Primrose Oil: Take 1000mg by mouth three times per day. This encourages the cervix to ripen. Cervical ripening is when your cervix becomes more soft and stretchy to get ready for dilation and effacement.     Red Raspberry Leaf Tea: Red raspberry tea (get it at a Co-op or in the grocery store). Drink three cups per day (hot or cold). This can help to prepare the uterine muscles for labor.      Fetal Kick Counts    It is important to know when your baby's movements occur. We often get busy with work and life and do not pay close attention to their movements.        Women typically begin feeling movement between 18-22 weeks of gestation, sometimes it can be earlier or later depending on where your placenta is       Movements usually begin feeling like popping or fluttering and as the baby grows they become more pronounced    Toward the end of pregnancy as the baby gets larger they may not move as much or make as big of movements. Babies have maturing sleep cycles as well as not as much room to move and flip. If  "you are ever concerned about your baby's movements or have not felt the baby move for a while, we recommend you do a fetal kick count. Prior to starting your count drink a glass of water or juice and eat a snack. Then lay down on your side and begin to count movements.         How to do a Fetal Kick Counts    There are many different ways to monitor your baby's movements. Movements can range from large jabs to small kicks, or wiggles.  Hiccups count!      Count 10 movements in 2 hours when resting and focusing    Count 10 movements in 12 hours when doing normal activity    We recommend that if movements occur but seem decreased that you should be seen in the clinic or hospital for evaluation within 12 hours. If fetal movement is absent or fetal kick counts are low please contact us right away.    If you ever have any concerns about your baby's movements DO NOT HESITATE to call us, we are here for you!    [facility]  [phone]      PREECLAMPSIA SIGNS AND SYMPTOMS    Preeclampsia is a dangerous condition that some women develop in the second half of pregnancy. It can also begin after the baby is born.  Preeclampsia causes high blood pressure and can cause problems with many organ systems in your body.  It can also affect the growth of your baby. The exact cause of preeclampsia is unknown, however, there are signs and symptoms to watch for:    -A bad headache that doesn't improve with Tylenol  -Visual changes such as spots, flashes of light, blurry vision  -Pain in the upper right part of your abdomen, especially under the ribs that doesn't go away  -Nausea and/or vomiting  -Feeling extremely tired  -Yellowing of the skin and/or eyes  -Feeling \"not quite right\" or that something is wrong  -An extreme amount of swelling (some swelling in pregnancy is very normal)    If your midwife feels that you are developing preeclampsia, you will have lab tests drawn and will be monitored very closely.     If you " are experiencing anyof these symptoms, call the [facility] immediately at [phone].

## 2019-07-10 NOTE — PROGRESS NOTES
Feels well, questions about how far past her due date she can go before induction, torn between wanting to labor naturally and also wanting a July baby.   Reviewed post dates management, induction of labor between 41-42, wood score, BPP at 41  Fetal movement: positive  Denies vaginal bleeding/lof, some nadine contractions  Warning signs reviewed   Labor signs and symptoms discussed, aware of numbers to call  Denies s/sx of pre-eclampsia and aware of what to report  Return to clinic 1 week    LITZY Glasgow, CNM

## 2019-07-16 ENCOUNTER — PRENATAL OFFICE VISIT (OUTPATIENT)
Dept: MIDWIFE SERVICES | Facility: CLINIC | Age: 34
End: 2019-07-16
Payer: COMMERCIAL

## 2019-07-16 VITALS — DIASTOLIC BLOOD PRESSURE: 62 MMHG | BODY MASS INDEX: 30.09 KG/M2 | SYSTOLIC BLOOD PRESSURE: 114 MMHG | WEIGHT: 195 LBS

## 2019-07-16 DIAGNOSIS — Z34.03 ENCOUNTER FOR SUPERVISION OF NORMAL FIRST PREGNANCY IN THIRD TRIMESTER: ICD-10-CM

## 2019-07-16 PROCEDURE — 99207 ZZC PRENATAL VISIT: CPT | Performed by: ADVANCED PRACTICE MIDWIFE

## 2019-07-25 ENCOUNTER — PRENATAL OFFICE VISIT (OUTPATIENT)
Dept: MIDWIFE SERVICES | Facility: CLINIC | Age: 34
End: 2019-07-25
Payer: COMMERCIAL

## 2019-07-25 VITALS — WEIGHT: 199.8 LBS | SYSTOLIC BLOOD PRESSURE: 122 MMHG | DIASTOLIC BLOOD PRESSURE: 60 MMHG | BODY MASS INDEX: 30.83 KG/M2

## 2019-07-25 DIAGNOSIS — Z3A.40 40 WEEKS GESTATION OF PREGNANCY: ICD-10-CM

## 2019-07-25 DIAGNOSIS — O48.0 POST-TERM PREGNANCY, 40-42 WEEKS OF GESTATION: Primary | ICD-10-CM

## 2019-07-25 DIAGNOSIS — Z34.03 ENCOUNTER FOR SUPERVISION OF NORMAL FIRST PREGNANCY IN THIRD TRIMESTER: ICD-10-CM

## 2019-07-25 PROCEDURE — 99207 ZZC PRENATAL VISIT: CPT | Performed by: NURSE PRACTITIONER

## 2019-07-25 NOTE — PROGRESS NOTES
"Feels ready to meet baby!  Feels a little tired, but states \"doing good\".  Here with Eddie today  Fetal movement: positive  Denies vaginal bleeding/lof, a little contractions; having \"tightening\" occasionally, resolves on own  Warning signs reviewed   Labor signs and symptoms discussed  Denies s/sx of pre-eclampsia and aware of what to report  Discussed post dates management, order for BPP at 41 weeks, discussed recommendation for induction of labor after 41 weeks  Return to clinic 1 week    Janis MCGHEE CNM        "

## 2019-07-25 NOTE — PATIENT INSTRUCTIONS
"PREECLAMPSIA SIGNS AND SYMPTOMS    Preeclampsia is a dangerous condition that some women develop in the second half of pregnancy. It can also begin after the baby is born.  Preeclampsia causes high blood pressure and can cause problems with many organ systems in your body.  It can also affect the growth of your baby. The exact cause of preeclampsia is unknown, however, there are signs and symptoms to watch for:    -A bad headache that doesn't improve with Tylenol  -Visual changes such as spots, flashes of light, blurry vision  -Pain in the upper right part of your abdomen, especially under the ribs that doesn't go away  -Nausea and/or vomiting  -Feeling extremely tired  -Yellowing of the skin and/or eyes  -Feeling \"not quite right\" or that something is wrong  -An extreme amount of swelling (some swelling in pregnancy is very normal)    If your midwife feels that you are developing preeclampsia, you will have lab tests drawn and will be monitored very closely.     If you are experiencing anyof these symptoms, call the [facility] immediately at [phone].  Labor Instructions for Midwife Patients    When to call:  Both during and after office hours call  [phone]. There is a triage RN to take your calls and answer your questions 24 hours a day.  If she cannot answer your question she will page the midwife on call for you.    When to call:  Call anytime you have important concerns about you or your baby.     Call if:    You are having contractions at regular intervals about 5-6 minutes apart lasting 30-60 seconds and becoming increasingly more intense     You have an uncontrollable gush of fluid from your vagina or feel a pop and gush like your water has broken    You have HEAVY bleeding, like heavy period, blood running down your legs, or  soaking a pad.     Some bleeding after a pelvic exam, after intercourse, or in labor when your cervix is dilating is normal and is referred to as \"bloody " "show\"    You have severe, continuous back or abdominal pain    You feel it is time to go to the hospital    If this is your first labor, call when contractions are very intense and have been about every 3-4 minutes for about an hour    If it is your second labor or more, call when contractions are strong and about every 3-5 minutes or sooner depending on your level of discomfort.     Keep in mind we are always here for you! If you have questions, concerns please don't hesitate to call us.     What to eat/drink in labor: Drink plenty of fluid (water most importantly, juice, soda or tea without caffeine). Eat rice, pasta, soup, cereal, bread/toast, and fruit. Avoid dairy and greasy food as they are difficult to digest and you may experience some nausea during labor.    Comfort measures:    Baths and showers (ok even with ruptured membranes, it may temporarily slow contractions if you are still in the early stage of labor)    Warm/hot packs for back pain or discomfort    Back, belly, or thigh massages    Standing, rocking, walking, leaning over bed or tables, side-lying and sleeping    Miscellaneous:     Contractions are timed from the beginning of one to the beginning of the next    Try hard to sleep during the early stage of labor when you are not that uncomfortable. Timing of contractions at this point is not important    Even if you cannot sleep, resting in bed or on the couch can help you maintain your energy for labor    When you arrive at the hospital the nurse will check your baby's heartbeat, check your cervix, and will call us. The midwife on call will come in and be with you when you are in active labor    After hours you need to enter the hospital through the emergency room  Fetal Kick Counts    It is important to know when your baby's movements occur. We often get busy with work and life and do not pay close attention to their movements.        Women typically begin feeling movement between 18-22 weeks of " gestation, sometimes it can be earlier or later depending on where your placenta is       Movements usually begin feeling like popping or fluttering and as the baby grows they become more pronounced    Toward the end of pregnancy as the baby gets larger they may not move as much or make as big of movements. Babies have maturing sleep cycles as well as not as much room to move and flip. If you are ever concerned about your baby's movements or have not felt the baby move for a while, we recommend you do a fetal kick count. Prior to starting your count drink a glass of water or juice and eat a snack. Then lay down on your side and begin to count movements.     How to do a Fetal Kick Counts    There are many different ways to monitor your baby's movements. Movements can range from large jabs to small kicks, or wiggles.  Hiccups count!      Count 10 movements in 2 hours when resting and focusing    Count 10 movements in 12 hours when doing normal activity    We recommend that if movements occur but seem decreased that you should be seen in the clinic or hospital for evaluation within 12 hours. If fetal movement is absent or fetal kick counts are low please contact us right away.    If you ever have any concerns about your baby's movements DO NOT HESITATE to call us, we are here for you!    [facility]  [phone]         Post Dates Management    If you've gone beyond your due date you are probably thinking when is this baby coming!  Most babies are born on or after their due date so it is nothing to worry about.    If you are pregnant at 41 weeks we will start some increased monitoring to make sure that your baby and the placenta are healthy enough to continue your pregnancy.      We would have you come to the clinic every 3-4 days to be assessed with an ultrasound called a Biophysical profile (BPP).       This tells us how your baby is doing. We monitor fetal movement, breathing patterns, amniotic fluid  level, and heart rate.     Research has shown that by 42 weeks gestation the placenta is not always healthy enough to support the pregnancy further and it is better for the baby to be born.  If you are still pregnant by 41 and a half weeks we will discuss induction of labor with you and the different options available.

## 2019-07-27 ENCOUNTER — NURSE TRIAGE (OUTPATIENT)
Dept: NURSING | Facility: CLINIC | Age: 34
End: 2019-07-27

## 2019-07-27 NOTE — TELEPHONE ENCOUNTER
"Patient calling. States she woke up with head cold symptoms.    States her head is congested and full. Denies fever. Asking about taking Tylenol. Reviewed [employer] information with patient.    Protocol and care advice reviewed  Caller states understanding of the recommended disposition    Advised to call back if further questions or concerns      Reason for Disposition    Caller requesting information about medication during pregnancy; adult is not ill and triager answers question    Additional Information    Negative: [1] DOUBLE DOSE (an extra dose or lesser amount) of prescription drug AND [2] NO symptoms (Exception: a double dose of antibiotics)    Negative: Diabetes drug error or overdose (e.g., insulin or extra dose)    Negative: [1] Request for URGENT new prescription or refill of \"essential\" medication (i.e., likelihood of harm to patient if not taken) AND [2] triager unable to fill per unit policy    Negative: [1] Prescription not at pharmacy AND [2] was prescribed today by PCP    Negative: Pharmacy calling with prescription questions and triager unable to answer question    Negative: Caller has URGENT medication question about med that PCP prescribed and triager unable to answer question    Negative: Caller has NON-URGENT medication question about med that PCP prescribed and triager unable to answer question    Negative: Caller requesting a NON-URGENT new prescription or refill and triager unable to refill per unit policy    Negative: Caller has medication question about med not prescribed by PCP and triager unable to answer question (e.g., compatibility with other med, storage)    Negative: [1] DOUBLE DOSE (an extra dose or lesser amount) of over-the-counter (OTC) drug AND [2] NO symptoms    Negative: [1] DOUBLE DOSE (an extra dose or lesser amount) of antibiotic drug AND [2] NO symptoms    Protocols used: MEDICATION QUESTION CALL-A-AH      "

## 2019-07-31 PROBLEM — Z34.03 ENCOUNTER FOR SUPERVISION OF NORMAL FIRST PREGNANCY IN THIRD TRIMESTER: Status: ACTIVE | Noted: 2019-04-08

## 2019-07-31 NOTE — PROGRESS NOTES
Feels tired, but doing good.  Here with Eddie today. Asking about plan for IOL  Fetal movement: positive    BPP:  8/8,  LUH:  13.05  Denies vaginal bleeding/lof, a little contractions  Warning signs reviewed   Labor signs and symptoms discussed, aware of numbers to call  Denies s/sx of preeclampsia and aware of what to report  Induction of labor discussed and recommended between 41-42 weeks  Induction of labor scheduled:  Yes  Date/Time for induction of labor:  8/5/19 @ 1930    Chambers Score:  Dilation of Cervix:  0        Score = 0  Effacement:  60-70%;   Score = 2  Consistency:  Soft;   Score = 2  Position:  Posterior;   Score = 0  Station:  -2;   Score = 1  Total Chambers Score:  5    Cervical ripening needed:  Yes  Discussed possible methods cervical ripening, risks/benefits and expectations.     Instructed to call L&D 1 hour prior to induction of labor  Counseled on danger signs, signs and symptoms labor, call clinic number with any problems/concerns    Janis MCGHEE, DONY

## 2019-08-02 ENCOUNTER — PRENATAL OFFICE VISIT (OUTPATIENT)
Dept: MIDWIFE SERVICES | Facility: CLINIC | Age: 34
End: 2019-08-02
Payer: COMMERCIAL

## 2019-08-02 ENCOUNTER — ANCILLARY PROCEDURE (OUTPATIENT)
Dept: ULTRASOUND IMAGING | Facility: CLINIC | Age: 34
End: 2019-08-02
Payer: COMMERCIAL

## 2019-08-02 VITALS — WEIGHT: 200 LBS | DIASTOLIC BLOOD PRESSURE: 70 MMHG | BODY MASS INDEX: 30.86 KG/M2 | SYSTOLIC BLOOD PRESSURE: 104 MMHG

## 2019-08-02 DIAGNOSIS — O48.0 POST-TERM PREGNANCY, 40-42 WEEKS OF GESTATION: ICD-10-CM

## 2019-08-02 DIAGNOSIS — O48.0 POST-TERM PREGNANCY, 40-42 WEEKS OF GESTATION: Primary | ICD-10-CM

## 2019-08-02 DIAGNOSIS — Z34.03 ENCOUNTER FOR SUPERVISION OF NORMAL FIRST PREGNANCY IN THIRD TRIMESTER: ICD-10-CM

## 2019-08-02 DIAGNOSIS — O48.0 41 WEEKS GESTATION OF PREGNANCY: ICD-10-CM

## 2019-08-02 DIAGNOSIS — Z3A.41 41 WEEKS GESTATION OF PREGNANCY: ICD-10-CM

## 2019-08-02 PROCEDURE — 99207 ZZC PRENATAL VISIT: CPT | Performed by: NURSE PRACTITIONER

## 2019-08-02 PROCEDURE — 76819 FETAL BIOPHYS PROFIL W/O NST: CPT | Performed by: OBSTETRICS & GYNECOLOGY

## 2019-08-02 PROCEDURE — 59426 ANTEPARTUM CARE ONLY: CPT | Performed by: NURSE PRACTITIONER

## 2019-08-02 NOTE — PATIENT INSTRUCTIONS
"PREECLAMPSIA SIGNS AND SYMPTOMS    Preeclampsia is a dangerous condition that some women develop in the second half of pregnancy. It can also begin after the baby is born.  Preeclampsia causes high blood pressure and can cause problems with many organ systems in your body.  It can also affect the growth of your baby. The exact cause of preeclampsia is unknown, however, there are signs and symptoms to watch for:    -A bad headache that doesn't improve with Tylenol  -Visual changes such as spots, flashes of light, blurry vision  -Pain in the upper right part of your abdomen, especially under the ribs that doesn't go away  -Nausea and/or vomiting  -Feeling extremely tired  -Yellowing of the skin and/or eyes  -Feeling \"not quite right\" or that something is wrong  -An extreme amount of swelling (some swelling in pregnancy is very normal)    If your midwife feels that you are developing preeclampsia, you will have lab tests drawn and will be monitored very closely.     If you are experiencing anyof these symptoms, call the [facility] immediately at [phone].  Labor Instructions for Midwife Patients    When to call:  Both during and after office hours call  [phone]. There is a triage RN to take your calls and answer your questions 24 hours a day.  If she cannot answer your question she will page the midwife on call for you.    When to call:  Call anytime you have important concerns about you or your baby.     Call if:    You are having contractions at regular intervals about 5-6 minutes apart lasting 30-60 seconds and becoming increasingly more intense     You have an uncontrollable gush of fluid from your vagina or feel a pop and gush like your water has broken    You have HEAVY bleeding, like heavy period, blood running down your legs, or  soaking a pad.     Some bleeding after a pelvic exam, after intercourse, or in labor when your cervix is dilating is normal and is referred to as \"bloody " "show\"    You have severe, continuous back or abdominal pain    You feel it is time to go to the hospital    If this is your first labor, call when contractions are very intense and have been about every 3-4 minutes for about an hour    If it is your second labor or more, call when contractions are strong and about every 3-5 minutes or sooner depending on your level of discomfort.     Keep in mind we are always here for you! If you have questions, concerns please don't hesitate to call us.     What to eat/drink in labor: Drink plenty of fluid (water most importantly, juice, soda or tea without caffeine). Eat rice, pasta, soup, cereal, bread/toast, and fruit. Avoid dairy and greasy food as they are difficult to digest and you may experience some nausea during labor.    Comfort measures:    Baths and showers (ok even with ruptured membranes, it may temporarily slow contractions if you are still in the early stage of labor)    Warm/hot packs for back pain or discomfort    Back, belly, or thigh massages    Standing, rocking, walking, leaning over bed or tables, side-lying and sleeping    Miscellaneous:     Contractions are timed from the beginning of one to the beginning of the next    Try hard to sleep during the early stage of labor when you are not that uncomfortable. Timing of contractions at this point is not important    Even if you cannot sleep, resting in bed or on the couch can help you maintain your energy for labor    When you arrive at the hospital the nurse will check your baby's heartbeat, check your cervix, and will call us. The midwife on call will come in and be with you when you are in active labor    After hours you need to enter the hospital through the emergency room  Fetal Kick Counts    It is important to know when your baby's movements occur. We often get busy with work and life and do not pay close attention to their movements.        Women typically begin feeling movement between 18-22 weeks of " gestation, sometimes it can be earlier or later depending on where your placenta is       Movements usually begin feeling like popping or fluttering and as the baby grows they become more pronounced    Toward the end of pregnancy as the baby gets larger they may not move as much or make as big of movements. Babies have maturing sleep cycles as well as not as much room to move and flip. If you are ever concerned about your baby's movements or have not felt the baby move for a while, we recommend you do a fetal kick count. Prior to starting your count drink a glass of water or juice and eat a snack. Then lay down on your side and begin to count movements.     How to do a Fetal Kick Counts    There are many different ways to monitor your baby's movements. Movements can range from large jabs to small kicks, or wiggles.  Hiccups count!      Count 10 movements in 2 hours when resting and focusing    Count 10 movements in 12 hours when doing normal activity    We recommend that if movements occur but seem decreased that you should be seen in the clinic or hospital for evaluation within 12 hours. If fetal movement is absent or fetal kick counts are low please contact us right away.    If you ever have any concerns about your baby's movements DO NOT HESITATE to call us, we are here for you!    [facility]  [phone]         Types of induction:  Depending on your cervical exam is how we determine which method of induction to use      Ripening- if your cervix is closed, minimally open, firm, or very posterior you may need medication to help soften your cervix before it can open up.   Cytotec-is a pill inserted vaginally or taken orally that helps soften your cervix. The medication is usually repeated every 2-4 hours until labor is achieved   Cervidil-is a medication that is inserted vaginally and left in for 12 hours and then removed.   Hernandez bulb/cook catheter-is a mechanical way to ripen a cervix, a small  catheter is inserted into your cervix and filled with sterile water, it puts pressure on your cervix to thin out and dilate. The balloon stays in place for about 12 hours and sometimes falls out on its own    -If ripening is necessary we usually start at 41w5d or 41w6d and bring you in the evening to get the process started. The nurses will get you admitted and we will check in with you once you are settled. These types of inductions cannot be done on a woman who has had a previous  section.    Pitocin-is a synthetic form of a hormone your body produces naturally called oxytocin. There is a very small dose in a large bag of IV fluid and it goes in through an IV a little bit at a time. Some people only need a very small dose to get into labor, other women may need their dose increased over time. It can safely be used in small dose for women who have had a previous  section. Your cervix must be favorable in order to effectively use pitocin.    AROM-if your cervix is very favorable we can sometimes artificially rupture your bag of water to encourage labor to start. We do some with a small plastic instrument while checking your cervix. This is a natural way to encourage labor but is only used when your baby is in a good position and your cervix is dilated at least 3 or 4 cm.     Any time we do an induction there is an increased risk of  section. Sometimes inductions are necessary but we will continue to advocate for you and educate you about your choices!    Please contact us with any questions or concerns   Dom Knight [phone]

## 2019-08-05 ENCOUNTER — HOSPITAL ENCOUNTER (INPATIENT)
Facility: CLINIC | Age: 34
LOS: 4 days | Discharge: HOME | End: 2019-08-09
Attending: NURSE PRACTITIONER | Admitting: NURSE PRACTITIONER
Payer: COMMERCIAL

## 2019-08-05 DIAGNOSIS — Z98.891 STATUS POST CESAREAN DELIVERY: Primary | ICD-10-CM

## 2019-08-05 PROBLEM — O48.0 POST-TERM PREGNANCY, 40-42 WEEKS OF GESTATION: Chronic | Status: ACTIVE | Noted: 2019-08-05

## 2019-08-05 PROBLEM — O48.0 POST-TERM PREGNANCY, 40-42 WEEKS OF GESTATION: Status: ACTIVE | Noted: 2019-08-05

## 2019-08-05 LAB
ABO + RH BLD: NORMAL
ABO + RH BLD: NORMAL
BASOPHILS # BLD AUTO: 0 10E9/L (ref 0–0.2)
BASOPHILS NFR BLD AUTO: 0.2 %
BLD GP AB SCN SERPL QL: NORMAL
BLOOD BANK CMNT PATIENT-IMP: NORMAL
DIFFERENTIAL METHOD BLD: ABNORMAL
EOSINOPHIL # BLD AUTO: 0.1 10E9/L (ref 0–0.7)
EOSINOPHIL NFR BLD AUTO: 0.6 %
ERYTHROCYTE [DISTWIDTH] IN BLOOD BY AUTOMATED COUNT: 13.2 % (ref 10–15)
HCT VFR BLD AUTO: 37 % (ref 35–47)
HGB BLD-MCNC: 12.8 G/DL (ref 11.7–15.7)
IMM GRANULOCYTES # BLD: 0.1 10E9/L (ref 0–0.4)
IMM GRANULOCYTES NFR BLD: 0.8 %
LYMPHOCYTES # BLD AUTO: 2.6 10E9/L (ref 0.8–5.3)
LYMPHOCYTES NFR BLD AUTO: 17.6 %
MCH RBC QN AUTO: 32.9 PG (ref 26.5–33)
MCHC RBC AUTO-ENTMCNC: 34.6 G/DL (ref 31.5–36.5)
MCV RBC AUTO: 95 FL (ref 78–100)
MONOCYTES # BLD AUTO: 1.4 10E9/L (ref 0–1.3)
MONOCYTES NFR BLD AUTO: 9.4 %
NEUTROPHILS # BLD AUTO: 10.4 10E9/L (ref 1.6–8.3)
NEUTROPHILS NFR BLD AUTO: 71.4 %
PLATELET # BLD AUTO: 250 10E9/L (ref 150–450)
RBC # BLD AUTO: 3.89 10E12/L (ref 3.8–5.2)
SPECIMEN EXP DATE BLD: NORMAL
WBC # BLD AUTO: 14.5 10E9/L (ref 4–11)

## 2019-08-05 PROCEDURE — 86780 TREPONEMA PALLIDUM: CPT | Performed by: NURSE PRACTITIONER

## 2019-08-05 PROCEDURE — 25800030 ZZH RX IP 258 OP 636: Performed by: NURSE PRACTITIONER

## 2019-08-05 PROCEDURE — 25000132 ZZH RX MED GY IP 250 OP 250 PS 637: Performed by: NURSE PRACTITIONER

## 2019-08-05 PROCEDURE — 86901 BLOOD TYPING SEROLOGIC RH(D): CPT | Performed by: NURSE PRACTITIONER

## 2019-08-05 PROCEDURE — 85025 COMPLETE CBC W/AUTO DIFF WBC: CPT | Performed by: NURSE PRACTITIONER

## 2019-08-05 PROCEDURE — 86850 RBC ANTIBODY SCREEN: CPT | Performed by: NURSE PRACTITIONER

## 2019-08-05 PROCEDURE — 36415 COLL VENOUS BLD VENIPUNCTURE: CPT | Performed by: NURSE PRACTITIONER

## 2019-08-05 PROCEDURE — 12000000 ZZH R&B MED SURG/OB

## 2019-08-05 PROCEDURE — 86900 BLOOD TYPING SEROLOGIC ABO: CPT | Performed by: NURSE PRACTITIONER

## 2019-08-05 RX ORDER — OXYTOCIN/0.9 % SODIUM CHLORIDE 30/500 ML
100-340 PLASTIC BAG, INJECTION (ML) INTRAVENOUS CONTINUOUS PRN
Status: DISCONTINUED | OUTPATIENT
Start: 2019-08-05 | End: 2019-08-06

## 2019-08-05 RX ORDER — MISOPROSTOL 100 UG/1
25 TABLET ORAL
Status: DISCONTINUED | OUTPATIENT
Start: 2019-08-05 | End: 2019-08-06

## 2019-08-05 RX ORDER — METHYLERGONOVINE MALEATE 0.2 MG/ML
200 INJECTION INTRAVENOUS
Status: DISCONTINUED | OUTPATIENT
Start: 2019-08-05 | End: 2019-08-06

## 2019-08-05 RX ORDER — OXYCODONE AND ACETAMINOPHEN 5; 325 MG/1; MG/1
1 TABLET ORAL
Status: DISCONTINUED | OUTPATIENT
Start: 2019-08-05 | End: 2019-08-06

## 2019-08-05 RX ORDER — OXYTOCIN 10 [USP'U]/ML
10 INJECTION, SOLUTION INTRAMUSCULAR; INTRAVENOUS
Status: DISCONTINUED | OUTPATIENT
Start: 2019-08-05 | End: 2019-08-06

## 2019-08-05 RX ORDER — LIDOCAINE 40 MG/G
CREAM TOPICAL
Status: DISCONTINUED | OUTPATIENT
Start: 2019-08-05 | End: 2019-08-06

## 2019-08-05 RX ORDER — ACETAMINOPHEN 325 MG/1
650 TABLET ORAL EVERY 4 HOURS PRN
Status: DISCONTINUED | OUTPATIENT
Start: 2019-08-05 | End: 2019-08-06

## 2019-08-05 RX ORDER — HYDROXYZINE HYDROCHLORIDE 50 MG/1
50 TABLET, FILM COATED ORAL EVERY 6 HOURS PRN
Status: DISCONTINUED | OUTPATIENT
Start: 2019-08-05 | End: 2019-08-06

## 2019-08-05 RX ORDER — SODIUM CHLORIDE, SODIUM LACTATE, POTASSIUM CHLORIDE, CALCIUM CHLORIDE 600; 310; 30; 20 MG/100ML; MG/100ML; MG/100ML; MG/100ML
INJECTION, SOLUTION INTRAVENOUS CONTINUOUS
Status: DISCONTINUED | OUTPATIENT
Start: 2019-08-05 | End: 2019-08-06

## 2019-08-05 RX ORDER — ONDANSETRON 2 MG/ML
4 INJECTION INTRAMUSCULAR; INTRAVENOUS EVERY 6 HOURS PRN
Status: DISCONTINUED | OUTPATIENT
Start: 2019-08-05 | End: 2019-08-06

## 2019-08-05 RX ORDER — CARBOPROST TROMETHAMINE 250 UG/ML
250 INJECTION, SOLUTION INTRAMUSCULAR
Status: DISCONTINUED | OUTPATIENT
Start: 2019-08-05 | End: 2019-08-06

## 2019-08-05 RX ORDER — HYDROXYZINE HYDROCHLORIDE 25 MG/1
25 TABLET, FILM COATED ORAL EVERY 6 HOURS PRN
Status: DISCONTINUED | OUTPATIENT
Start: 2019-08-05 | End: 2019-08-06

## 2019-08-05 RX ORDER — IBUPROFEN 400 MG/1
800 TABLET, FILM COATED ORAL
Status: DISCONTINUED | OUTPATIENT
Start: 2019-08-05 | End: 2019-08-06

## 2019-08-05 RX ORDER — PRENATAL VIT/IRON FUM/FOLIC AC 27MG-0.8MG
1 TABLET ORAL DAILY
Status: DISCONTINUED | OUTPATIENT
Start: 2019-08-06 | End: 2019-08-06 | Stop reason: CLARIF

## 2019-08-05 RX ORDER — NALOXONE HYDROCHLORIDE 0.4 MG/ML
.1-.4 INJECTION, SOLUTION INTRAMUSCULAR; INTRAVENOUS; SUBCUTANEOUS
Status: DISCONTINUED | OUTPATIENT
Start: 2019-08-05 | End: 2019-08-06

## 2019-08-05 RX ADMIN — HYDROXYZINE HYDROCHLORIDE 50 MG: 50 TABLET, FILM COATED ORAL at 22:04

## 2019-08-05 RX ADMIN — MISOPROSTOL 25 MCG: 100 TABLET ORAL at 22:54

## 2019-08-05 RX ADMIN — SODIUM CHLORIDE, POTASSIUM CHLORIDE, SODIUM LACTATE AND CALCIUM CHLORIDE 500 ML: 600; 310; 30; 20 INJECTION, SOLUTION INTRAVENOUS at 21:28

## 2019-08-05 RX ADMIN — MISOPROSTOL 25 MCG: 100 TABLET ORAL at 20:50

## 2019-08-06 ENCOUNTER — ANESTHESIA EVENT (OUTPATIENT)
Dept: OBGYN | Facility: CLINIC | Age: 34
End: 2019-08-06
Payer: COMMERCIAL

## 2019-08-06 ENCOUNTER — ANESTHESIA (OUTPATIENT)
Dept: OBGYN | Facility: CLINIC | Age: 34
End: 2019-08-06
Payer: COMMERCIAL

## 2019-08-06 PROBLEM — Z98.891 STATUS POST CESAREAN DELIVERY: Status: ACTIVE | Noted: 2019-08-06

## 2019-08-06 LAB — T PALLIDUM AB SER QL: NONREACTIVE

## 2019-08-06 PROCEDURE — 27110038 ZZH RX 271: Performed by: ANESTHESIOLOGY

## 2019-08-06 PROCEDURE — 25000125 ZZHC RX 250: Performed by: ANESTHESIOLOGY

## 2019-08-06 PROCEDURE — 25000132 ZZH RX MED GY IP 250 OP 250 PS 637

## 2019-08-06 PROCEDURE — 25000132 ZZH RX MED GY IP 250 OP 250 PS 637: Performed by: OBSTETRICS & GYNECOLOGY

## 2019-08-06 PROCEDURE — 37000011 ZZH ANESTHESIA WARD SERVICE

## 2019-08-06 PROCEDURE — 25800030 ZZH RX IP 258 OP 636: Performed by: NURSE PRACTITIONER

## 2019-08-06 PROCEDURE — 25000128 H RX IP 250 OP 636: Performed by: OBSTETRICS & GYNECOLOGY

## 2019-08-06 PROCEDURE — 25000128 H RX IP 250 OP 636: Performed by: ANESTHESIOLOGY

## 2019-08-06 PROCEDURE — 25800030 ZZH RX IP 258 OP 636: Performed by: OBSTETRICS & GYNECOLOGY

## 2019-08-06 PROCEDURE — 37000008 ZZH ANESTHESIA TECHNICAL FEE, 1ST 30 MIN: Performed by: OBSTETRICS & GYNECOLOGY

## 2019-08-06 PROCEDURE — 27210794 ZZH OR GENERAL SUPPLY STERILE: Performed by: OBSTETRICS & GYNECOLOGY

## 2019-08-06 PROCEDURE — 25000128 H RX IP 250 OP 636: Performed by: NURSE PRACTITIONER

## 2019-08-06 PROCEDURE — 36000056 ZZH SURGERY LEVEL 3 1ST 30 MIN: Performed by: OBSTETRICS & GYNECOLOGY

## 2019-08-06 PROCEDURE — 25000125 ZZHC RX 250: Performed by: NURSE ANESTHETIST, CERTIFIED REGISTERED

## 2019-08-06 PROCEDURE — 25000132 ZZH RX MED GY IP 250 OP 250 PS 637: Performed by: NURSE PRACTITIONER

## 2019-08-06 PROCEDURE — 12000035 ZZH R&B POSTPARTUM

## 2019-08-06 PROCEDURE — 25800030 ZZH RX IP 258 OP 636: Performed by: ANESTHESIOLOGY

## 2019-08-06 PROCEDURE — 37000009 ZZH ANESTHESIA TECHNICAL FEE, EACH ADDTL 15 MIN: Performed by: OBSTETRICS & GYNECOLOGY

## 2019-08-06 PROCEDURE — 36000058 ZZH SURGERY LEVEL 3 EA 15 ADDTL MIN: Performed by: OBSTETRICS & GYNECOLOGY

## 2019-08-06 PROCEDURE — 25800030 ZZH RX IP 258 OP 636: Performed by: NURSE ANESTHETIST, CERTIFIED REGISTERED

## 2019-08-06 PROCEDURE — 59515 CESAREAN DELIVERY: CPT | Performed by: OBSTETRICS & GYNECOLOGY

## 2019-08-06 PROCEDURE — 71000014 ZZH RECOVERY PHASE 1 LEVEL 2 FIRST HR: Performed by: OBSTETRICS & GYNECOLOGY

## 2019-08-06 PROCEDURE — 25000128 H RX IP 250 OP 636: Performed by: NURSE ANESTHETIST, CERTIFIED REGISTERED

## 2019-08-06 RX ORDER — ROPIVACAINE HYDROCHLORIDE 2 MG/ML
10 INJECTION, SOLUTION EPIDURAL; INFILTRATION; PERINEURAL ONCE
Status: DISCONTINUED | OUTPATIENT
Start: 2019-08-06 | End: 2019-08-06

## 2019-08-06 RX ORDER — MORPHINE SULFATE 1 MG/ML
2 INJECTION, SOLUTION EPIDURAL; INTRATHECAL; INTRAVENOUS ONCE
Status: DISCONTINUED | OUTPATIENT
Start: 2019-08-06 | End: 2019-08-06

## 2019-08-06 RX ORDER — LIDOCAINE HCL/EPINEPHRINE/PF 2%-1:200K
VIAL (ML) INJECTION
Status: DISCONTINUED
Start: 2019-08-06 | End: 2019-08-06 | Stop reason: HOSPADM

## 2019-08-06 RX ORDER — IBUPROFEN 400 MG/1
800 TABLET, FILM COATED ORAL EVERY 6 HOURS PRN
Status: DISCONTINUED | OUTPATIENT
Start: 2019-08-06 | End: 2019-08-09 | Stop reason: HOSPADM

## 2019-08-06 RX ORDER — BISACODYL 10 MG
10 SUPPOSITORY, RECTAL RECTAL DAILY PRN
Status: DISCONTINUED | OUTPATIENT
Start: 2019-08-08 | End: 2019-08-09 | Stop reason: HOSPADM

## 2019-08-06 RX ORDER — CEFAZOLIN SODIUM 1 G/3ML
1 INJECTION, POWDER, FOR SOLUTION INTRAMUSCULAR; INTRAVENOUS SEE ADMIN INSTRUCTIONS
Status: DISCONTINUED | OUTPATIENT
Start: 2019-08-06 | End: 2019-08-06

## 2019-08-06 RX ORDER — DEXTROSE, SODIUM CHLORIDE, SODIUM LACTATE, POTASSIUM CHLORIDE, AND CALCIUM CHLORIDE 5; .6; .31; .03; .02 G/100ML; G/100ML; G/100ML; G/100ML; G/100ML
INJECTION, SOLUTION INTRAVENOUS CONTINUOUS
Status: DISCONTINUED | OUTPATIENT
Start: 2019-08-06 | End: 2019-08-09 | Stop reason: HOSPADM

## 2019-08-06 RX ORDER — LIDOCAINE 40 MG/G
CREAM TOPICAL
Status: DISCONTINUED | OUTPATIENT
Start: 2019-08-06 | End: 2019-08-06

## 2019-08-06 RX ORDER — FENTANYL CITRATE 50 UG/ML
100 INJECTION, SOLUTION INTRAMUSCULAR; INTRAVENOUS ONCE
Status: DISCONTINUED | OUTPATIENT
Start: 2019-08-06 | End: 2019-08-06

## 2019-08-06 RX ORDER — ROPIVACAINE HYDROCHLORIDE 2 MG/ML
10 INJECTION, SOLUTION EPIDURAL; INFILTRATION; PERINEURAL ONCE
Status: COMPLETED | OUTPATIENT
Start: 2019-08-06 | End: 2019-08-06

## 2019-08-06 RX ORDER — LANOLIN 100 %
OINTMENT (GRAM) TOPICAL
Status: DISCONTINUED | OUTPATIENT
Start: 2019-08-06 | End: 2019-08-09 | Stop reason: HOSPADM

## 2019-08-06 RX ORDER — ONDANSETRON 2 MG/ML
4 INJECTION INTRAMUSCULAR; INTRAVENOUS EVERY 6 HOURS PRN
Status: DISCONTINUED | OUTPATIENT
Start: 2019-08-06 | End: 2019-08-09 | Stop reason: HOSPADM

## 2019-08-06 RX ORDER — CEFAZOLIN SODIUM 2 G/100ML
INJECTION, SOLUTION INTRAVENOUS
Status: DISCONTINUED
Start: 2019-08-06 | End: 2019-08-06 | Stop reason: HOSPADM

## 2019-08-06 RX ORDER — LIDOCAINE 40 MG/G
CREAM TOPICAL
Status: DISCONTINUED | OUTPATIENT
Start: 2019-08-06 | End: 2019-08-09 | Stop reason: HOSPADM

## 2019-08-06 RX ORDER — ACETAMINOPHEN 325 MG/1
650 TABLET ORAL EVERY 4 HOURS PRN
Status: DISCONTINUED | OUTPATIENT
Start: 2019-08-09 | End: 2019-08-09 | Stop reason: HOSPADM

## 2019-08-06 RX ORDER — HYDROCORTISONE 2.5 %
CREAM (GRAM) TOPICAL 3 TIMES DAILY PRN
Status: DISCONTINUED | OUTPATIENT
Start: 2019-08-06 | End: 2019-08-09 | Stop reason: HOSPADM

## 2019-08-06 RX ORDER — EPHEDRINE SULFATE 50 MG/ML
5 INJECTION, SOLUTION INTRAMUSCULAR; INTRAVENOUS; SUBCUTANEOUS
Status: DISCONTINUED | OUTPATIENT
Start: 2019-08-06 | End: 2019-08-06

## 2019-08-06 RX ORDER — OXYTOCIN/0.9 % SODIUM CHLORIDE 30/500 ML
340 PLASTIC BAG, INJECTION (ML) INTRAVENOUS CONTINUOUS PRN
Status: DISCONTINUED | OUTPATIENT
Start: 2019-08-06 | End: 2019-08-09 | Stop reason: HOSPADM

## 2019-08-06 RX ORDER — OXYTOCIN 10 [USP'U]/ML
10 INJECTION, SOLUTION INTRAMUSCULAR; INTRAVENOUS
Status: DISCONTINUED | OUTPATIENT
Start: 2019-08-06 | End: 2019-08-09 | Stop reason: HOSPADM

## 2019-08-06 RX ORDER — NALBUPHINE HYDROCHLORIDE 10 MG/ML
2.5-5 INJECTION, SOLUTION INTRAMUSCULAR; INTRAVENOUS; SUBCUTANEOUS EVERY 6 HOURS PRN
Status: DISCONTINUED | OUTPATIENT
Start: 2019-08-06 | End: 2019-08-06

## 2019-08-06 RX ORDER — AZITHROMYCIN 500 MG/1
500 INJECTION, POWDER, LYOPHILIZED, FOR SOLUTION INTRAVENOUS
Status: COMPLETED | OUTPATIENT
Start: 2019-08-06 | End: 2019-08-06

## 2019-08-06 RX ORDER — LIDOCAINE HYDROCHLORIDE AND EPINEPHRINE 15; 5 MG/ML; UG/ML
INJECTION, SOLUTION EPIDURAL PRN
Status: DISCONTINUED | OUTPATIENT
Start: 2019-08-06 | End: 2019-08-13 | Stop reason: HOSPADM

## 2019-08-06 RX ORDER — OXYTOCIN/0.9 % SODIUM CHLORIDE 30/500 ML
1-24 PLASTIC BAG, INJECTION (ML) INTRAVENOUS CONTINUOUS
Status: DISCONTINUED | OUTPATIENT
Start: 2019-08-06 | End: 2019-08-06

## 2019-08-06 RX ORDER — MORPHINE SULFATE 1 MG/ML
INJECTION, SOLUTION EPIDURAL; INTRATHECAL; INTRAVENOUS PRN
Status: DISCONTINUED | OUTPATIENT
Start: 2019-08-06 | End: 2019-08-06

## 2019-08-06 RX ORDER — AMOXICILLIN 250 MG
1 CAPSULE ORAL 2 TIMES DAILY PRN
Status: DISCONTINUED | OUTPATIENT
Start: 2019-08-06 | End: 2019-08-09 | Stop reason: HOSPADM

## 2019-08-06 RX ORDER — OXYCODONE HYDROCHLORIDE 5 MG/1
5-10 TABLET ORAL
Status: DISCONTINUED | OUTPATIENT
Start: 2019-08-06 | End: 2019-08-09 | Stop reason: HOSPADM

## 2019-08-06 RX ORDER — OXYTOCIN/0.9 % SODIUM CHLORIDE 30/500 ML
100 PLASTIC BAG, INJECTION (ML) INTRAVENOUS CONTINUOUS
Status: DISCONTINUED | OUTPATIENT
Start: 2019-08-06 | End: 2019-08-09 | Stop reason: HOSPADM

## 2019-08-06 RX ORDER — HYDROMORPHONE HYDROCHLORIDE 1 MG/ML
.3-.5 INJECTION, SOLUTION INTRAMUSCULAR; INTRAVENOUS; SUBCUTANEOUS EVERY 30 MIN PRN
Status: DISCONTINUED | OUTPATIENT
Start: 2019-08-06 | End: 2019-08-09 | Stop reason: HOSPADM

## 2019-08-06 RX ORDER — OXYTOCIN/0.9 % SODIUM CHLORIDE 30/500 ML
PLASTIC BAG, INJECTION (ML) INTRAVENOUS CONTINUOUS PRN
Status: DISCONTINUED | OUTPATIENT
Start: 2019-08-06 | End: 2019-08-06

## 2019-08-06 RX ORDER — CITRIC ACID/SODIUM CITRATE 334-500MG
30 SOLUTION, ORAL ORAL
Status: COMPLETED | OUTPATIENT
Start: 2019-08-06 | End: 2019-08-06

## 2019-08-06 RX ORDER — SODIUM CHLORIDE, SODIUM LACTATE, POTASSIUM CHLORIDE, CALCIUM CHLORIDE 600; 310; 30; 20 MG/100ML; MG/100ML; MG/100ML; MG/100ML
INJECTION, SOLUTION INTRAVENOUS CONTINUOUS PRN
Status: DISCONTINUED | OUTPATIENT
Start: 2019-08-06 | End: 2019-08-06

## 2019-08-06 RX ORDER — SODIUM CHLORIDE, SODIUM LACTATE, POTASSIUM CHLORIDE, CALCIUM CHLORIDE 600; 310; 30; 20 MG/100ML; MG/100ML; MG/100ML; MG/100ML
INJECTION, SOLUTION INTRAVENOUS CONTINUOUS
Status: DISCONTINUED | OUTPATIENT
Start: 2019-08-06 | End: 2019-08-06

## 2019-08-06 RX ORDER — KETOROLAC TROMETHAMINE 30 MG/ML
30 INJECTION, SOLUTION INTRAMUSCULAR; INTRAVENOUS EVERY 6 HOURS
Status: DISPENSED | OUTPATIENT
Start: 2019-08-06 | End: 2019-08-07

## 2019-08-06 RX ORDER — OXYTOCIN/0.9 % SODIUM CHLORIDE 30/500 ML
PLASTIC BAG, INJECTION (ML) INTRAVENOUS
Status: DISCONTINUED
Start: 2019-08-06 | End: 2019-08-06 | Stop reason: HOSPADM

## 2019-08-06 RX ORDER — ONDANSETRON 2 MG/ML
INJECTION INTRAMUSCULAR; INTRAVENOUS
Status: DISCONTINUED
Start: 2019-08-06 | End: 2019-08-06 | Stop reason: HOSPADM

## 2019-08-06 RX ORDER — LIDOCAINE HYDROCHLORIDE AND EPINEPHRINE BITARTRATE 20; .01 MG/ML; MG/ML
INJECTION, SOLUTION SUBCUTANEOUS PRN
Status: DISCONTINUED | OUTPATIENT
Start: 2019-08-06 | End: 2019-08-06

## 2019-08-06 RX ORDER — FENTANYL CITRATE 50 UG/ML
100 INJECTION, SOLUTION INTRAMUSCULAR; INTRAVENOUS ONCE
Status: COMPLETED | OUTPATIENT
Start: 2019-08-06 | End: 2019-08-06

## 2019-08-06 RX ORDER — NALOXONE HYDROCHLORIDE 0.4 MG/ML
.1-.4 INJECTION, SOLUTION INTRAMUSCULAR; INTRAVENOUS; SUBCUTANEOUS
Status: DISCONTINUED | OUTPATIENT
Start: 2019-08-06 | End: 2019-08-06

## 2019-08-06 RX ORDER — ONDANSETRON 2 MG/ML
INJECTION INTRAMUSCULAR; INTRAVENOUS PRN
Status: DISCONTINUED | OUTPATIENT
Start: 2019-08-06 | End: 2019-08-06

## 2019-08-06 RX ORDER — AZITHROMYCIN 500 MG/1
INJECTION, POWDER, LYOPHILIZED, FOR SOLUTION INTRAVENOUS
Status: DISCONTINUED
Start: 2019-08-06 | End: 2019-08-06 | Stop reason: HOSPADM

## 2019-08-06 RX ORDER — MORPHINE SULFATE 1 MG/ML
INJECTION, SOLUTION EPIDURAL; INTRATHECAL; INTRAVENOUS
Status: COMPLETED
Start: 2019-08-06 | End: 2019-08-06

## 2019-08-06 RX ORDER — NALOXONE HYDROCHLORIDE 0.4 MG/ML
.1-.4 INJECTION, SOLUTION INTRAMUSCULAR; INTRAVENOUS; SUBCUTANEOUS
Status: DISCONTINUED | OUTPATIENT
Start: 2019-08-06 | End: 2019-08-09 | Stop reason: HOSPADM

## 2019-08-06 RX ORDER — ACETAMINOPHEN 325 MG/1
975 TABLET ORAL EVERY 8 HOURS
Status: DISCONTINUED | OUTPATIENT
Start: 2019-08-06 | End: 2019-08-09 | Stop reason: HOSPADM

## 2019-08-06 RX ORDER — AMOXICILLIN 250 MG
2 CAPSULE ORAL 2 TIMES DAILY PRN
Status: DISCONTINUED | OUTPATIENT
Start: 2019-08-06 | End: 2019-08-09 | Stop reason: HOSPADM

## 2019-08-06 RX ORDER — ACETAMINOPHEN 325 MG/1
TABLET ORAL
Status: COMPLETED
Start: 2019-08-06 | End: 2019-08-06

## 2019-08-06 RX ORDER — CEFAZOLIN SODIUM 2 G/100ML
2 INJECTION, SOLUTION INTRAVENOUS
Status: COMPLETED | OUTPATIENT
Start: 2019-08-06 | End: 2019-08-06

## 2019-08-06 RX ORDER — KETOROLAC TROMETHAMINE 30 MG/ML
INJECTION, SOLUTION INTRAMUSCULAR; INTRAVENOUS PRN
Status: DISCONTINUED | OUTPATIENT
Start: 2019-08-06 | End: 2019-08-06

## 2019-08-06 RX ORDER — SIMETHICONE 80 MG
80 TABLET,CHEWABLE ORAL 4 TIMES DAILY PRN
Status: DISCONTINUED | OUTPATIENT
Start: 2019-08-06 | End: 2019-08-09 | Stop reason: HOSPADM

## 2019-08-06 RX ADMIN — SODIUM CHLORIDE, POTASSIUM CHLORIDE, SODIUM LACTATE AND CALCIUM CHLORIDE 250 ML: 600; 310; 30; 20 INJECTION, SOLUTION INTRAVENOUS at 11:02

## 2019-08-06 RX ADMIN — OXYTOCIN-SODIUM CHLORIDE 0.9% IV SOLN 30 UNIT/500ML 340 ML/HR: 30-0.9/5 SOLUTION at 19:57

## 2019-08-06 RX ADMIN — ONDANSETRON 4 MG: 2 INJECTION INTRAMUSCULAR; INTRAVENOUS at 19:58

## 2019-08-06 RX ADMIN — OXYTOCIN 2 MILLI-UNITS/MIN: 10 INJECTION INTRAVENOUS at 03:29

## 2019-08-06 RX ADMIN — ONDANSETRON 4 MG: 2 INJECTION INTRAMUSCULAR; INTRAVENOUS at 19:39

## 2019-08-06 RX ADMIN — LIDOCAINE HYDROCHLORIDE AND EPINEPHRINE BITARTRATE 2 ML: 20; .01 INJECTION, SOLUTION SUBCUTANEOUS at 19:40

## 2019-08-06 RX ADMIN — CEFAZOLIN SODIUM 2 G: 2 INJECTION, SOLUTION INTRAVENOUS at 19:40

## 2019-08-06 RX ADMIN — KETOROLAC TROMETHAMINE 30 MG: 30 INJECTION, SOLUTION INTRAMUSCULAR at 20:32

## 2019-08-06 RX ADMIN — SODIUM CHLORIDE, SODIUM LACTATE, POTASSIUM CHLORIDE, CALCIUM CHLORIDE: 600; 310; 30; 20 INJECTION, SOLUTION INTRAVENOUS at 18:58

## 2019-08-06 RX ADMIN — PHENYLEPHRINE HYDROCHLORIDE 0.5 MCG/KG/MIN: 10 INJECTION INTRAVENOUS at 19:39

## 2019-08-06 RX ADMIN — LIDOCAINE HYDROCHLORIDE AND EPINEPHRINE 3 ML: 15; 5 INJECTION, SOLUTION EPIDURAL at 09:10

## 2019-08-06 RX ADMIN — ACETAMINOPHEN 975 MG: 325 TABLET, FILM COATED ORAL at 21:26

## 2019-08-06 RX ADMIN — OXYTOCIN 100 ML/HR: 10 INJECTION INTRAVENOUS at 23:55

## 2019-08-06 RX ADMIN — ROPIVACAINE HYDROCHLORIDE 9 ML: 2 INJECTION, SOLUTION EPIDURAL; INFILTRATION at 09:15

## 2019-08-06 RX ADMIN — MORPHINE SULFATE 2 MG: 1 INJECTION, SOLUTION EPIDURAL; INTRATHECAL; INTRAVENOUS at 20:13

## 2019-08-06 RX ADMIN — Medication 12 ML/HR: at 09:12

## 2019-08-06 RX ADMIN — SODIUM CHLORIDE, SODIUM LACTATE, POTASSIUM CHLORIDE, CALCIUM CHLORIDE: 600; 310; 30; 20 INJECTION, SOLUTION INTRAVENOUS at 19:59

## 2019-08-06 RX ADMIN — SODIUM CITRATE AND CITRIC ACID MONOHYDRATE 30 ML: 500; 334 SOLUTION ORAL at 19:08

## 2019-08-06 RX ADMIN — SODIUM CHLORIDE, POTASSIUM CHLORIDE, SODIUM LACTATE AND CALCIUM CHLORIDE: 600; 310; 30; 20 INJECTION, SOLUTION INTRAVENOUS at 03:29

## 2019-08-06 RX ADMIN — LIDOCAINE HYDROCHLORIDE AND EPINEPHRINE BITARTRATE 5 ML: 20; .01 INJECTION, SOLUTION SUBCUTANEOUS at 19:38

## 2019-08-06 RX ADMIN — LIDOCAINE HYDROCHLORIDE AND EPINEPHRINE BITARTRATE 5 ML: 20; .01 INJECTION, SOLUTION SUBCUTANEOUS at 19:46

## 2019-08-06 RX ADMIN — SODIUM CHLORIDE, POTASSIUM CHLORIDE, SODIUM LACTATE AND CALCIUM CHLORIDE: 600; 310; 30; 20 INJECTION, SOLUTION INTRAVENOUS at 08:36

## 2019-08-06 RX ADMIN — LIDOCAINE HYDROCHLORIDE AND EPINEPHRINE BITARTRATE 5 ML: 20; .01 INJECTION, SOLUTION SUBCUTANEOUS at 19:07

## 2019-08-06 RX ADMIN — MISOPROSTOL 25 MCG: 100 TABLET ORAL at 00:58

## 2019-08-06 RX ADMIN — ACETAMINOPHEN 650 MG: 325 TABLET, FILM COATED ORAL at 14:15

## 2019-08-06 RX ADMIN — FENTANYL CITRATE 100 MCG: 50 INJECTION, SOLUTION INTRAMUSCULAR; INTRAVENOUS at 09:15

## 2019-08-06 RX ADMIN — AZITHROMYCIN MONOHYDRATE 500 MG: 500 INJECTION, POWDER, LYOPHILIZED, FOR SOLUTION INTRAVENOUS at 19:40

## 2019-08-06 RX ADMIN — Medication 5 MG: at 11:02

## 2019-08-06 RX ADMIN — SODIUM CHLORIDE, POTASSIUM CHLORIDE, SODIUM LACTATE AND CALCIUM CHLORIDE 250 ML: 600; 310; 30; 20 INJECTION, SOLUTION INTRAVENOUS at 04:47

## 2019-08-06 RX ADMIN — SODIUM CHLORIDE, POTASSIUM CHLORIDE, SODIUM LACTATE AND CALCIUM CHLORIDE 500 ML: 600; 310; 30; 20 INJECTION, SOLUTION INTRAVENOUS at 07:04

## 2019-08-06 ASSESSMENT — LIFESTYLE VARIABLES: TOBACCO_USE: 0

## 2019-08-06 NOTE — PLAN OF CARE
Bridgett is a 32yo  41w6d here for induction of labor. Denies problems with this pregnancy. Expecting a boy, plans to breastfeed. Plans to use nitrous then get an epidural. +FM. Cxns palpates mod to stg.   0800-Nitrous started.   0819-Doesn't like the way the nitrous makes her feel (nauseous). Wants epidural. LR bolus initiated for epidural placement  0858-Dr Bhandari here to place epidural  09-Consent signed.  900-Timeout completed  2206-9523:4 min prolonged deceleration after SVE with SROM. Rotated to both right and left lateral positions. Stopped pitocin. IV bolus and O2 on.   1005-Pitocin restarted.  7891-8616: prolonged decels for 6 mins with dara of 70s. DONY Schwartz at bedside. LR bolus initiated, pitocin turned off, O2 started.   1155-Pitocin restarted.  1315-Lana, CNM updated on decels.  1413-Lana CNM updated on decels.  1425-Pitocin stopped.  1450-Lana, CNM updated on decels, SVE, uterine activity and labor status.  1549-Lana, CNM called by phone to report prolonged decel with marked variability after SVE 7-8cm/80%/0 station. Turn off pitocin and place scalp electrode. Pit stopped. IV LR bolus started.  1555-Scalp electrode placed.   1615-DONY Schwartz at bedside to discuss decels and potential for  delivery.  1702- prolonged decel x6 mins after position change. O2 applied.   1705-Dr Jimenez at bedside to assess and discuss POC. Plan to recheck SVE in 1 hour.   1845-Dr jimenez reviewed strip. Agrees with plan to do C/S.   185-GIGI done.  191-Report given to Lynette BENITEZ RN to assume all cares. MDA at bedside.

## 2019-08-06 NOTE — ANESTHESIA PREPROCEDURE EVALUATION
"Anesthesia Pre-Procedure Evaluation    Patient: Bridgett Brady   MRN: [id number] : 1985          Preoperative Diagnosis: * No surgery found *        Past Medical History:   Diagnosis Date     Depressive disorder     no meds     Past Surgical History:   Procedure Laterality Date     HEAD & NECK SURGERY      wisdom teeth extraction       Anesthesia Evaluation       history and physical reviewed .             ROS/MED HX    ENT/Pulmonary:       Neurologic:       Cardiovascular:  - neg cardiovascular ROS       METS/Exercise Tolerance:     Hematologic:         Musculoskeletal:         GI/Hepatic:         Renal/Genitourinary:         Endo:         Psychiatric:     (+) psychiatric history depression      Infectious Disease:         Malignancy:         Other:                     neg OB ROS                   Lab Results   Component Value Date    WBC 14.5 (H) 2019    HGB 12.8 2019    HCT 37.0 2019     2019     2019    POTASSIUM 4.1 2019    CHLORIDE 106 2019    CO2 21 2019    BUN 8 2019    CR 0.70 2019    GLC 35 (LL) 2019    CAROL 8.8 2019    ALBUMIN 3.0 (L) 2019    PROTTOTAL 6.6 (L) 2019    ALT 25 2019    AST 21 2019    ALKPHOS 130 2019    BILITOTAL 0.3 2019       Preop Vitals  BP Readings from Last 3 Encounters:   19 95/53   19 104/70   19 122/60    Pulse Readings from Last 3 Encounters:   19 73   19 66   11 64      Resp Readings from Last 3 Encounters:   19 16    SpO2 Readings from Last 3 Encounters:   No data found for SpO2      Temp Readings from Last 1 Encounters:   19 36.2  C (97.1  F) (Temporal)    Ht Readings from Last 1 Encounters:   19 1.715 m (5' 7.5\")      Wt Readings from Last 1 Encounters:   19 90.7 kg (200 lb)    Estimated body mass index is 30.86 kg/m  as calculated from the following:    Height as of 19: 1.715 m (5' " "7.5\").    Weight as of 8/2/19: 90.7 kg (200 lb).       Anesthesia Plan      History & Physical Review      ASA Status:  2 .  OB Epidural Asa: 2       Plan for Epidural          Postoperative Care      Consents  Anesthetic plan, risks, benefits and alternatives discussed with:  Patient..                 Rich Harris DO  "

## 2019-08-06 NOTE — PROVIDER NOTIFICATION
08/05/19 1930   Provider Notification   Provider Name/Title Janis NPAOLES   Method of Notification Phone   Notification Reason Patient Arrived;PAULA Vargas or her way to see pt. Will place orders after evaluating pt.

## 2019-08-06 NOTE — PLAN OF CARE
FHT noted to be tachycardic, pt repositioned, fluid bolus given. Pt states she ate a granola bar and had a glass of apple juice. Janis in the department and aware. Will continue to monitor per POC.

## 2019-08-06 NOTE — PLAN OF CARE
Pt arrived to unit with  and admitted to room 220 @ 1915. Pt oriented to unit, room and call light system. External monitors applied with pt's verbal consent. VSS on YUAN MITCHELL performed and Janis LAYTON CNM notified of pt's arrival. Will continue to monitor.

## 2019-08-06 NOTE — PLAN OF CARE
VSS on RA. Up ad modesto, independent. Voiding without difficulty. Uncomfortable with ctx. FHT cat 2 with occasional late and variable decels, moderate variability and accelerations present. Pitocin @ 3U/hr. Report given to Fay MONDRAGON, to assume care of pt.

## 2019-08-06 NOTE — PROGRESS NOTES
EFEM PROGRESS NOTE    SUBJECTIVE:  Doing well, wondering if we should just be calling the  now? Wants to do what the safest option for baby is.  and mom supportive at bedside.    OBJECTIVE:  BP 90/51   Temp 98  F (36.7  C) (Temporal)   Resp 16   LMP 2018   SpO2 100%     Fetal heart tones: Baseline 140   Variability: moderate  Accelerations: absent  Decelerations: present; prolonged 5.5 mins after cervical exam, pitocin discontinued     Contractions: Pt is elvis every 4-7 minutes, lasting  seconds and palpates moderate    Cervix: 6/ 80/ 0, Vtx  ROM: moderate meconium fluid    Pitocin- 10 mu/min.    ASSESSMENT:  IUP @ 41w6d active labor and IOL for postdates   GBS- negative  Fetal intolerance to labor with use of pitocin     PLAN:   Again reviewed possible need for  section with recurrent variables, late decelerations, and prolonged decelerations  MD consultant on call Dr. Jimenez made aware of situation in clinic and available prn  Continue close monitoring     LITZY Fisher CNM

## 2019-08-06 NOTE — PROGRESS NOTES
Called to evaluate patient for intermittent fetal heart rate decelerations during postdates induction of labor.   admitted last evening for oral cytotec cervical ripening and started on pitocin this AM.  Comfortable with epidural.  Has had intermittent variable decelerations as well as recurrent prolonged late decelerations.  Most of prolonged decelerations have been provoked with either position changes or cervical checks.  Making steady cervical change but somewhat intolerant of pitocin augmentation.  Recovers with normal fetal rate tracing when pitocin off.  Currently 8cm/90/0 station.  Recent 4-5min decel to 70s with position change.  Recovered to 160s with moderate variability.  +accels.  Discussed options at this time and goal of safely getting to delivery with healthy mother and infant.  Given her steady cervical change and current stable fetal status, will continue observation at this time.  Will recheck cervix in one hour and re-evaluate.  Labs drawn and patient consented in the case of need for c/s for non-reassuring fetal well being.  Bridgett and  in agreement.  RN and CNM present for entire discussion.    Brandee Jimenez MD

## 2019-08-06 NOTE — PROGRESS NOTES
S/O: Paged by GIANCARLO Cortez asking for me to review EFM strip.  Bridgett has been having variable and/or late decelerations and is due for her 3rd PO misoprostol.  Dana states that Bridgett has been having contractions q 7-8 minutes and has been sleeping.    VSS  EFM: baseline 130, moderate variability, +accels, +decels variable and late intermittently approximately 2 per hour since 0100  [facility]: contractions q 5-10 minutes, not being felt by patient     A: 34yo  @ 41w 6d undergoing induction of labor d/t postdates   Cervical ripening in process, has had 2 PO misoprostol   Membranes intact    P:  SVE by RN  If Chabmers 8 or >, start IV pitocin and stop ripening  If Chambers <8, will consider holding miso dose if decels become recurrent or prolonged.  Otherwise, will continue with misoprostol ripening.  Continue to monitor fetal status.  Resuscitative measures PRN.  Will come in for eval PRN    Janis MCGHEE, DONY

## 2019-08-06 NOTE — PROGRESS NOTES
CNM PROGRESS NOTE    SUBJECTIVE: I feel much better after the epidural. I want to do whatever is safest for the baby. We just want a healthy baby.    OBJECTIVE:  BP 97/52   Temp 96.9  F (36.1  C) (Temporal)   Resp 16   LMP 2018   SpO2 100%     Fetal heart tones: Baseline 135  Variability: moderate  Accelerations: absent  Decelerations: present, recurrent variable & late decelerations, prolonged deceleration x 2  Decelerations recover with pitocin being turned off, fluid bolus, oxygen and position changes    Contractions: Pt is elvis every 1-5 minutes, lasting  seconds and palpates moderate    Cervix: 5/ 80/ -1/-2, Vtx  ROM: moderate meconium fluid    Pitocin- up to 4 mu max and turned off   Cervical ripening: had cytotec for ripening x 3 doses    ASSESSMENT:  IUP @ 41w6d active labor  Post dates labor induction  GBS- negative  Fetal intolerance to labor with pitocin      PLAN:   Discussed risks and benefits of continuing pitocin, fetal heart rate is not tolerating pitocin well and it continuously need to be shut off  Reviewed if this continues there may be a need for a  section.  Pitocin initially started at 0329, stopped at 0935 after 4 minute deceleration, restarted 1005, stopped again at 1055 after 6 minute prolonged deceleration  Pitocin turned off for 1 hour 2437-8463, restarted at 2 mu  Continue close monitoring   Recheck cervix at 1400, 2 hours after restarting pitocin      LITZY Fisher CNM

## 2019-08-06 NOTE — PROGRESS NOTES
DONY PROGRESS NOTE    SUBJECTIVE:  I am okay with proceeding with a  if that is the safest route of delivery for him.    OBJECTIVE:  BP 90/51   Temp 98  F (36.7  C) (Temporal)   Resp 16   LMP 2018   SpO2 100%     Fetal heart tones: Baseline 155  Variability: moderate  Accelerations: present  Decelerations: present; recurrent variable and late decelerations, prolonged deceleration x 7 minutes at 1540, followed by marked variability and period of 6 minutes of tachycardia     Contractions: Pt is elvis every 2-7 minutes, lasting  seconds and palpates moderate    Cervix: 7.5/ 80/ 0, Vtx  ROM: moderate meconium fluid    Pitocin- off    ASSESSMENT:  IUP @ 41w6d good progress and IOL for postdates   GBS- negative  Fetal intolerance to labor     PLAN:   Dr. Jimenez en route to evaluate for    Patient and family counseled   Type and screen/CBC with platelets done last night    LITZY Fisher CNM

## 2019-08-06 NOTE — ANESTHESIA PREPROCEDURE EVALUATION
"Anesthesia Pre-Procedure Evaluation    Patient: Bridgett Brady   MRN: [id number] : 1985          Preoperative Diagnosis: pregnancy    Procedure(s):   SECTION    Past Medical History:   Diagnosis Date     Depressive disorder     no meds     Past Surgical History:   Procedure Laterality Date     HEAD & NECK SURGERY      wisdom teeth extraction       Anesthesia Evaluation     . Pt has had prior anesthetic.     No history of anesthetic complications          ROS/MED HX    ENT/Pulmonary:      (-) tobacco use   Neurologic:       Cardiovascular:         METS/Exercise Tolerance: Comment: Pregnant >4 METS   Hematologic:         Musculoskeletal:         GI/Hepatic:         Renal/Genitourinary:         Endo:         Psychiatric:         Infectious Disease:         Malignancy:         Other:                          Physical Exam  Normal systems: cardiovascular, pulmonary and dental    Airway   Mallampati: III  TM distance: >3 FB  Neck ROM: full    Dental     Cardiovascular       Pulmonary             Lab Results   Component Value Date    WBC 14.5 (H) 2019    HGB 12.8 2019    HCT 37.0 2019     2019     2019    POTASSIUM 4.1 2019    CHLORIDE 106 2019    CO2 21 2019    BUN 8 2019    CR 0.70 2019    GLC 35 (LL) 2019    CAROL 8.8 2019    ALBUMIN 3.0 (L) 2019    PROTTOTAL 6.6 (L) 2019    ALT 25 2019    AST 21 2019    ALKPHOS 130 2019    BILITOTAL 0.3 2019       Preop Vitals  BP Readings from Last 3 Encounters:   19 102/54   19 104/70   19 122/60    Pulse Readings from Last 3 Encounters:   19 73   19 66   11 64      Resp Readings from Last 3 Encounters:   19 16    SpO2 Readings from Last 3 Encounters:   19 97%      Temp Readings from Last 1 Encounters:   19 37.2  C (99  F) (Oral)    Ht Readings from Last 1 Encounters:   19 1.715 m (5' 7.5\")    " "  Wt Readings from Last 1 Encounters:   08/02/19 90.7 kg (200 lb)    Estimated body mass index is 30.86 kg/m  as calculated from the following:    Height as of 4/8/19: 1.715 m (5' 7.5\").    Weight as of 8/2/19: 90.7 kg (200 lb).       Anesthesia Plan      History & Physical Review  History and physical reviewed and following examination; no interval change.    ASA Status:  2 .    NPO Status:  > 8 hours    Plan for Epidural (Epidural in place, working well)   PONV prophylaxis:  Ondansetron (or other 5HT-3)       Postoperative Care  Postoperative pain management:  IV analgesics, Oral pain medications, Multi-modal analgesia and Neuraxial analgesia.      Consents  Anesthetic plan, risks, benefits and alternatives discussed with:  Patient.  Use of blood products discussed: Yes.   Use of blood products discussed with Patient.  Consented to blood products.  .                 Jose A Capone MD  "

## 2019-08-06 NOTE — PROVIDER NOTIFICATION
08/06/19 0300   Provider Notification   Provider Name/Title Janis   Method of Notification Electronic Page   Notification Reason Decels   Occasional late decels noted. Will do SVE and if bishops 8, will start pitocin per Janis's order.

## 2019-08-06 NOTE — H&P
CNM Labor Admission History & Physical    Bridgett Brady is a 33 year old  with an IUP at 41w5d  ; ,   Partner/support Person: Eddie  Language Barrier: English  [facility]  Provider: CNDAMON's    Bridgett Brady is admitted to the Birthplace at [facility] on 2019 at 8:38 PM       History of present inllness/Chief Complaint:    Here with: induction of labor secondary to post dates pregnancy  Patient reports contractions are Occasional       Baby active Yes  Membranes are intact.  Bloody show No   Any changes with medical history since last prenatal visit No      Obstetrical history  Estimated Date of Delivery: 2019 determined by 8w 5d US  Patient's last menstrual period was 2018.   Dating U/S: 18    Fetal anatomic survey: Normal, done at   Placenta: anterior    PRENATAL COURSE  Prenatal care began at 8 wks gestation @  clinic, transferred care from  @24 weeks for a total of 10 prenatal visits with [facility].  Total wt gain 48; There is no height or weight on file to calculate BMI.  Prenatal Blood Pressure: WNL  Prenatal course was essentially uncomplicated  Tdap: 19  Rhogam: NA; O+    Patient Active Problem List   Diagnosis     Encounter for supervision of normal first pregnancy in third trimester     Indication for care in labor and delivery, antepartum     Post-term pregnancy, 40-42 weeks of gestation       HISTORY  Allergies   Allergen Reactions     Amoxicillin      Reaction as a child     Past Medical History:   Diagnosis Date     Depressive disorder     no meds     Past Surgical History:   Procedure Laterality Date     HEAD & NECK SURGERY      wisdom teeth extraction     Family History   Problem Relation Age of Onset     Hypertension Maternal Grandmother      Hyperlipidemia Maternal Grandmother      Coronary Artery Disease Maternal Grandfather      Hypertension Maternal Grandfather      Hyperlipidemia Maternal Grandfather      Coronary  Artery Disease Paternal Grandmother      Breast Cancer Maternal Aunt      Social History     Tobacco Use     Smoking status: Never Smoker     Smokeless tobacco: Never Used   Substance Use Topics     Alcohol use: Not Currently     OB History    Para Term  AB Living   1 0 0 0 0 0   SAB TAB Ectopic Multiple Live Births   0 0 0 0 0      # Outcome Date GA Lbr Riki/2nd Weight Sex Delivery Anes PTL Lv   1 Current                LABS:  Lab Results   Component Value Date    ABO O 2018    RH Pos 2018    AS Negative 2018    HGB 12.8 2019    HEPBANG Negative 2018    CHPCRT negative 2018    GCPCRT negative 2018       GBS Status:   Lab Results   Component Value Date    GBS Negative 2019     Rubella: Immune    HIV: Non-Reactive   Platelets:  237 19  1hr GCT:  96    ROS   Pt is alert and oriented  Pt denies significant constitutional symptoms including fever and/or malaise.    Pt denies significant respiratory, cardiovacular, GI, or muscular/skeletal complaints.    Neuro: Denies HA and visual changes  Muscoloskeletal: Denies except for discomforts r/t pregnancy     PHYSICAL EXAM:  /56   Temp 97.6  F (36.4  C) (Temporal)   Resp 16   LMP 2018   General appearance:  healthy, alert, active and no distress   Heart: RRR  Lungs: CTA bilaterally, normal respiratory effort  Abdomen: gravid, single vertex fetus, non-tender, EFW 9 lbs.   Legs:  Slight non-pitting bilateral edema     Contractions: Pt is elvis in an irregular pattern, rarely  Fetal heart tones: Baseline 130   Variability: moderate   Accelerations: present  Decelerations: absent    NST: reactive    Cervix: closed/ 70% / Posterior/ soft/ 0 to maternal right, Vtx position verified with bedside US  Bloody show: no  Membranes:  intact    ASSESSMENT:  33 year old  with albert IUP 41w5d for induction of labor.  Indication postdates  NST reactive  GBS negative and membranes intact  Chambers  score 6    PLAN:  Routine CNM care  Labs ordered: CBC, antitreponema and type and screen  Teaching done r/t comfort measures, pain management options, and labor processes, and cervical ripening process and agents  Counseled on options for misoprostol PO or PV, Cervidil.  Discussed R/B/SE of all methods.  PO misoprostol will be used until Chambers score is 8.    Once Chambers score is 8, will change to IV pitocin.  Admit - see IP orders  Pain medication if requested.  Discussed all options available.  Would like to try nitrous prior to epidural placement.  Theraputic sleep with Vistaril      LITZY Dueñas CNM

## 2019-08-06 NOTE — ANESTHESIA PROCEDURE NOTES
Peripheral nerve/Neuraxial procedure note : epidural catheter  Pre-Procedure  Performed by Thomas Bhandari MD  Location: OB      Pre-Anesthestic Checklist: patient identified, IV checked, risks and benefits discussed, informed consent and pre-op evaluation    Timeout  Correct Patient: Yes   Correct Procedure: Yes   Correct Site: Yes   Correct Laterality: N/A   Correct Position: Yes   Site Marked: N/A   .   Procedure Documentation    .    Procedure:    Epidural catheter.  Insertion Site:L3-4  (midline approach) Injection technique: LORT saline   Local skin infiltrated with mL of 1% lidocaine.  ABDOULAYE at 4.5 cm     Patient Prep;mask, sterile gloves, povidone-iodine 7.5% surgical scrub, patient draped.  .  Needle: [employer] needle Needle Gauge: 17.    Needle Length (Inches) 3.5  .   . .  Catheter threaded easily  3.5 cm epidural space.  8 cm at skin.   .    Assessment/Narrative  Paresthesias: No.  .  .  Aspiration negative for heme or CSF  . Test dose of 3 mL lidocaine 1.5% w/ 1:200,000 epinephrine at. Test dose negative for signs of intravascular, subdural or intrathecal injection. Comments:  [place] ABDOULAYE at 4.5 cm.  Catheter threaded easily.  Negative aspiration, Negative test dose.  No abnormal pain or paresthesia throughout.  Patient tolerated well.

## 2019-08-07 LAB — HGB BLD-MCNC: 9.8 G/DL (ref 11.7–15.7)

## 2019-08-07 PROCEDURE — 25000132 ZZH RX MED GY IP 250 OP 250 PS 637: Performed by: OBSTETRICS & GYNECOLOGY

## 2019-08-07 PROCEDURE — 12000035 ZZH R&B POSTPARTUM

## 2019-08-07 PROCEDURE — 59514 CESAREAN DELIVERY ONLY: CPT | Mod: AS | Performed by: ADVANCED PRACTICE MIDWIFE

## 2019-08-07 PROCEDURE — 36415 COLL VENOUS BLD VENIPUNCTURE: CPT | Performed by: OBSTETRICS & GYNECOLOGY

## 2019-08-07 PROCEDURE — 25000128 H RX IP 250 OP 636: Performed by: OBSTETRICS & GYNECOLOGY

## 2019-08-07 PROCEDURE — 85018 HEMOGLOBIN: CPT | Performed by: OBSTETRICS & GYNECOLOGY

## 2019-08-07 RX ORDER — DIPHENHYDRAMINE HYDROCHLORIDE 50 MG/ML
25 INJECTION INTRAMUSCULAR; INTRAVENOUS EVERY 6 HOURS PRN
Status: DISCONTINUED | OUTPATIENT
Start: 2019-08-07 | End: 2019-08-09 | Stop reason: HOSPADM

## 2019-08-07 RX ADMIN — OXYCODONE HYDROCHLORIDE 5 MG: 5 TABLET ORAL at 22:14

## 2019-08-07 RX ADMIN — SODIUM CHLORIDE, SODIUM LACTATE, POTASSIUM CHLORIDE, CALCIUM CHLORIDE AND DEXTROSE MONOHYDRATE: 5; 600; 310; 30; 20 INJECTION, SOLUTION INTRAVENOUS at 04:55

## 2019-08-07 RX ADMIN — KETOROLAC TROMETHAMINE 30 MG: 30 INJECTION, SOLUTION INTRAMUSCULAR at 14:58

## 2019-08-07 RX ADMIN — ACETAMINOPHEN 975 MG: 325 TABLET, FILM COATED ORAL at 04:55

## 2019-08-07 RX ADMIN — ACETAMINOPHEN 975 MG: 325 TABLET, FILM COATED ORAL at 20:49

## 2019-08-07 RX ADMIN — IBUPROFEN 800 MG: 400 TABLET ORAL at 20:48

## 2019-08-07 RX ADMIN — OXYCODONE HYDROCHLORIDE 5 MG: 5 TABLET ORAL at 04:55

## 2019-08-07 RX ADMIN — KETOROLAC TROMETHAMINE 30 MG: 30 INJECTION, SOLUTION INTRAMUSCULAR at 03:28

## 2019-08-07 RX ADMIN — SENNOSIDES AND DOCUSATE SODIUM 1 TABLET: 8.6; 5 TABLET ORAL at 20:48

## 2019-08-07 RX ADMIN — KETOROLAC TROMETHAMINE 30 MG: 30 INJECTION, SOLUTION INTRAMUSCULAR at 08:55

## 2019-08-07 RX ADMIN — ACETAMINOPHEN 975 MG: 325 TABLET, FILM COATED ORAL at 12:44

## 2019-08-07 NOTE — ANESTHESIA POSTPROCEDURE EVALUATION
Patient: Bridgett Brady    Procedure(s):   SECTION    Diagnosis:pregnancy  Diagnosis Additional Information: No value filed.    Anesthesia Type:  Epidural    Note:  Anesthesia Post Evaluation    Patient location during evaluation: OB PACU  Patient participation: Able to participate in evaluation but full recovery from regional anesthesia has not yet ocurrred but is anticipated to occur within 48 hours  Level of consciousness: awake  Pain management: adequate  Airway patency: patent  Cardiovascular status: acceptable  Respiratory status: acceptable  Hydration status: acceptable  PONV: none             Last vitals:  Vitals:    19 2050 19 2100 195   BP: 107/65 105/53 107/44   Pulse: 85 85 89   Resp: (!) 45 23 26   Temp: 37.9  C (100.2  F)     SpO2: 95% 98% 95%         Electronically Signed By: Jose A Capone MD  2019  9:31 PM

## 2019-08-07 NOTE — PLAN OF CARE
VSS Pt using toradol and tylenol for pain control. Also using abdominal binder. Up independently in the room. Hernandez discontinued at 1100, the patient is due to void. Tolerating a regular diet. IV saline locked. Breastfeeding on demand, baby latching well. Will continue to monitor.

## 2019-08-07 NOTE — OP NOTE
Procedure Date: 2019      PREOPERATIVE DIAGNOSES:   1.  41+6 weeks' gestation.   2.  Fetal intolerance to labor.      POSTOPERATIVE DIAGNOSES:   1.  41+6 weeks' gestation.   2.  Fetal intolerance to labor.   3.  Occiput posterior position.      PROCEDURE:  Primary low transverse  section.      SURGEON:  Brandee Jimenez MD.      ASSISTANT:  Lana Valderrama, certified nurse midwife.  Lana' assistance was needed for retraction and visualization.      ANESTHESIA:  Epidural.      COMPLICATIONS:  None.      QUANTITATIVE BLOOD LOSS:  685 mL.      FINDINGS:  A male infant in the occiput posterior position with a weight of 8 pounds 15 ounces and Apgars of 8 and 9.  Normal-appearing uterus, tubes and bilateral ovaries.      INDICATIONS:  Bridgett is a 33-year-old G1, P0, who was brought to Labor and Delivery at 41 plus 6 weeks' gestation for postdates of induction of labor with the Midwife Service.  Bridgett's pregnancy had been overall uncomplicated.  She received 3 doses of oral Cytotec overnight and was eventually started on Pitocin for labor augmentation.  She underwent rupture of membranes and made slow steady progress throughout the day.  Baby had intermittent late appearing decelerations with occasional prolonged decelerations throughout the labor process, but given steady change and reassuring monitoring in between these decelerations, we were able to continue the laboring process.  At approximately 8 cm, Pitocin had been stopped due to prolonged deceleration.  We then allowed to spontaneously labor for the ensuing 1.5 hours.  In approximately the last 30 minutes of the labor process no cervical change occurred and Bridgett was beginning to have repetitive late appearing decelerations with greater than 50% of contractions.  Given lack of cervical change and now repetitive late decelerations, decision was made to proceed with primary low transverse  section.  Risks, benefits and alternatives were discussed in  detail.  Consent was signed and appropriate teams were notified.      DESCRIPTION OF PROCEDURE:  Bridgett was taken to the operating room where epidural anesthesia was redosed to be adequate for  section.  A timeout was performed to identify correct patient and procedure.  Pfannenstiel skin incision was made using the scalpel and this was carried down to the underlying layer of fascia using the Bovie.  Fascia was then incised in the midline and this incision was extended laterally using the Verdugo scissor.  Superior aspect of the fascial incision was then grasped with Kocher clamps x 2, elevated, and the rectus muscles dissected.  Attention was similarly turned to the inferior border.  Rectus muscles were then  in the midline and the peritoneum was identified, tented and entered sharply.  This incision was extended both superiorly and inferiorly with good visualization of the bladder.  The bladder blade was then placed and the lower uterine segment was identified, tented and bladder flap created.  The bladder blade was replaced and the lower uterine segment was incised in a transverse fashion.  This incision was extended with gentle traction.  Infant head was delivered from 0 station with minimal difficulty and brought to the hysterotomy.  Remainder of the infant was delivered without difficulty and placed on the maternal abdomen.  Cord clamping was delayed by 1 minute.  The infant was handed off to awaiting  nurse practitioner.  The placenta was then delivered manually and intact.  The uterus was exteriorized and cleared of all clot and debris.  Uterine incision was then closed using an 0 Vicryl in a running locked fashion.  A second imbricating layer was placed of the same suture.  Several small areas of bleeding were noted in the midline in the incision and these were reapproximated and reinforced with figure-of-eight sutures.  We then replaced the uterus into the abdominal cavity.   Inspection again revealed a very small amount of bleeding.  One additional figure-of-eight suture was placed and Surgicel powder was used to cover the uterine incision.  The peritoneum was then reapproximated using a 3-0 Vicryl suture.  The fascia was reapproximated using an 0 looped PDS in a running fashion.  Irrigation was performed prior to skin closure.  This was completed using a 4-0 Monocryl in a subcuticular stitch.  Both mother and infant were doing very well following delivery and are stable for recovery.         BRANDEE RAMOS MD             D: 2019   T: 2019   MT: VIDA      Name:     MARGARITA MARTINEZ   MRN:      [id number]        Account:        [id number]   :      1985           Procedure Date: 2019      Document: [id number]       cc: Brandee Ramos MD

## 2019-08-07 NOTE — PROGRESS NOTES
[facility]    Obstetrics Post-Op / Progress Note    Assessment & Plan   Assessment:  -1 Day Post-Op  Procedure(s):   SECTION    Doing well.  No immediate surgical complications identified.  No excessive bleeding  Pain well-controlled.    Plan:  Ambulation encouraged  Hemoglobin in AM  Lactation consultation  Monitor wound for signs of infection  Pain control measures as needed  Reportable signs and symptoms dicussed with the patient  Discontinue spence catheter later today  ADAT    Brandee Jimenez     Interval History   Doing well.  Pain is well-controlled.  No fevers.  No history of wound drainage, warmth or significant erythema.  Good appetite.  Denies chest pain, shortness of breath, nausea or vomiting.  Ambulatory.  Breastfeeding well.  Did well overnight.  +itching this AM.  Up to bathroom already this AM without lightheadness.    Medications     dextrose 5% lactated ringers 125 mL/hr at 19 0455     - MEDICATION INSTRUCTIONS -       NO Rho (D) immune globulin (RhoGam) needed - mother Rh POSITIVE       - MEDICATION INSTRUCTIONS -       oxytocin in 0.9% NaCl 100 mL/hr (19 3465)     oxytocin in 0.9% NaCl         acetaminophen  975 mg Oral Q8H     ketorolac  30 mg Intravenous Q6H     sodium chloride (PF)  3 mL Intracatheter Q8H       Physical Exam   Temp: 98.6  F (37  C) Temp src: Oral BP: 99/53 Pulse: 79 Heart Rate: 76 Resp: 16 SpO2: 97 % O2 Device: None (Room air)    There were no vitals filed for this visit.  Vital Signs with Ranges  Temp:  [96.9  F (36.1  C)-100.8  F (38.2  C)] 98.6  F (37  C)  Pulse:  [79-90] 79  Heart Rate:  [75-89] 76  Resp:  [9-45] 16  BP: ()/(44-68) 99/53  SpO2:  [95 %-100 %] 97 %  I/O last 3 completed shifts:  In: 1500 [I.V.:1500]  Out: 2810 [Urine:2525; Blood:285]    Uterine fundus is firm, non-tender and at the level of the umbilicus; mild erythema in area of surgical drape  Incision C/D/I; bandage in place  Extremities Non-tender    Data    Recent Labs   Lab Test 08/05/19 2049   ABO O   RH Pos   AS Neg     Recent Labs   Lab Test 08/05/19 2049 06/25/19  0827   HGB 12.8 12.7     Recent Labs   Lab Test 12/05/18   RUQIGG Immune

## 2019-08-07 NOTE — PROGRESS NOTES
DONY Courtesy Round PPD#1, PCS    Doing well, able to sleep some last night.  Breastfeeding going okay, nurse have been helpful. Lactation will see today.    Will continue courtesy rounds while in hospital.     Geovanna MCGHEE CNM

## 2019-08-07 NOTE — PROGRESS NOTES
CNM Progress Note    At patient bedside [id number] discussing  section after no change SVE and continued fetal intolerance to labor. Recurrent late and variable decelerations and prolonged decelerations. Pitocin turned off at 1600. Dr Jimenez initially assessed around 1715 and we decided to proceed without pitocin and see if change was made naturally without pitocin as long as maternal and fetal well being was maintained. Decision was made to proceed with  around 1900.     Transfer of care to MD management  Proceed to  section for fetal intolerance to labor  CNMs to courtesy LITZY Ricketts, DONY

## 2019-08-07 NOTE — PLAN OF CARE
Patient was able to walk to the bathroom without difficulty.  Bleeding has been light.  Hernandez is patent.  D5LR is infusing.  Incision is c/d/I.  Tylenol, toradol and oxycodone for pain.  Working on breastfeeding.  RN assisted with latch.  Encouraged to call with questions.  Continue to monitor.

## 2019-08-07 NOTE — ANESTHESIA CARE TRANSFER NOTE
Patient: Bridgett Brady    Procedure(s):   SECTION    Diagnosis: pregnancy  Diagnosis Additional Information: No value filed.    Anesthesia Type:   Epidural     Note:  Airway :Room Air  Patient transferred to:PACU  Handoff Report: Identifed the Patient, Identified the Reponsible Provider, Reviewed the pertinent medical history, Discussed the surgical course, Reviewed Intra-OP anesthesia mangement and issues during anesthesia, Set expectations for post-procedure period and Allowed opportunity for questions and acknowledgement of understanding      Vitals: (Last set prior to Anesthesia Care Transfer)    CRNA VITALS  2019 - 2019             Resp Rate (set):  10                Electronically Signed By: LITZY Campos CRNA  2019  8:46 PM

## 2019-08-07 NOTE — PROGRESS NOTES
OB Progress Note    Patient re-evaluated 75min after previous check with no cervical change.  Per CNM Andrea, cervix 8/90/0.  Fetal heart rate baseline 150s with moderate variability but now late appearing decelerations after all but 2 of contractions (>50% of contractions).  +accels.  Cat II tracing without cervical change.  Discussed concerns for now repetitive decelerations and recommendation for primary  section for fetal intolerance of labor.  R/B/A discussed in detail.  Consent signed and appropriate teams notified.  Will proceed with primary c/s.    Brandee Jimenez MD

## 2019-08-07 NOTE — L&D DELIVERY NOTE
Obstetrics Brief Operative Note    Pre-operative diagnosis: 41+6wks gestations  Fetal intolerance to labor   Post-operative diagnosis: Same  Occiput posterior    Procedure: Primary low transverse  section   Surgeon: Brandee Jimenez MD   Assistant(s): Lana Mendez CNM   Anesthesia: Epidural anesthesia   Estimated blood loss: 685mL   Complications: None   Findings: Live   Male  Cephalic presentation:  occiput posterior  APGARS: 1 minute: 8   5 minute: 9  Weight: 8lbs 15oz  Placenta: normal  Tubes: normal  Uterus: normal  Ovaries: normal     Primary OB: Midwives

## 2019-08-07 NOTE — PLAN OF CARE
Vital signs stable. Voiding without difficulty. Lung sounds clear and equal. Using tylenol/ibuprofen for pain management. Up ambulating free of dizziness. Working on breastfeeding every 2-3 hours. Encouraged to call with questions/concerns. Will continue to monitor.

## 2019-08-07 NOTE — LACTATION NOTE
Initial Lactation visit. Observed shallow latch- demonstrated how to achieve deep latch and reviewed importance of. Observed strong, rhythmic suckle Discussed how to prevent and deal with sore nipples. Reviewed various positions. Recommend unlimited, frequent breast feedings: at least 8 - 12 times every 24 hours.  Avoid pacifiers and supplementation with formula unless medically indicated.  Explained benefits of holding baby skin on skin to help promote better breastfeeding outcomes.  Will continue to follow as needed. N Day RN IBCLC

## 2019-08-07 NOTE — ADDENDUM NOTE
Addendum  created 08/07/19 0459 by Jose A Capone MD    Attestation recorded in Intraprocedure, Intraprocedure Attestations filed

## 2019-08-08 PROCEDURE — 12000035 ZZH R&B POSTPARTUM

## 2019-08-08 PROCEDURE — 25000132 ZZH RX MED GY IP 250 OP 250 PS 637: Performed by: OBSTETRICS & GYNECOLOGY

## 2019-08-08 RX ADMIN — SIMETHICONE CHEW TAB 80 MG 80 MG: 80 TABLET ORAL at 05:43

## 2019-08-08 RX ADMIN — IBUPROFEN 800 MG: 400 TABLET ORAL at 15:29

## 2019-08-08 RX ADMIN — ACETAMINOPHEN 975 MG: 325 TABLET, FILM COATED ORAL at 21:16

## 2019-08-08 RX ADMIN — OXYCODONE HYDROCHLORIDE 5 MG: 5 TABLET ORAL at 05:37

## 2019-08-08 RX ADMIN — OXYCODONE HYDROCHLORIDE 5 MG: 5 TABLET ORAL at 15:30

## 2019-08-08 RX ADMIN — IBUPROFEN 800 MG: 400 TABLET ORAL at 03:30

## 2019-08-08 RX ADMIN — IBUPROFEN 800 MG: 400 TABLET ORAL at 21:16

## 2019-08-08 RX ADMIN — OXYCODONE HYDROCHLORIDE 5 MG: 5 TABLET ORAL at 21:20

## 2019-08-08 RX ADMIN — SENNOSIDES AND DOCUSATE SODIUM 2 TABLET: 8.6; 5 TABLET ORAL at 21:16

## 2019-08-08 RX ADMIN — ACETAMINOPHEN 975 MG: 325 TABLET, FILM COATED ORAL at 05:37

## 2019-08-08 RX ADMIN — IBUPROFEN 800 MG: 400 TABLET ORAL at 09:39

## 2019-08-08 RX ADMIN — SENNOSIDES AND DOCUSATE SODIUM 2 TABLET: 8.6; 5 TABLET ORAL at 09:39

## 2019-08-08 RX ADMIN — ACETAMINOPHEN 975 MG: 325 TABLET, FILM COATED ORAL at 13:49

## 2019-08-08 NOTE — PROGRESS NOTES
Courtesy Visit    Bridgett was sleeping. Plan for CNM to visit tomorrow.     Jelly Rawls, DNP, APRN, CNM

## 2019-08-08 NOTE — PLAN OF CARE
Vitals within defined limits. Denies dizziness/lightheadedness. Fundus firm. Lochia scant.  incision clean, dry, well-approximated -- steri strips intact. Trace edema to lower extremities. Using Tylenol/Motrin and occasional PRN Oxy for incisional soreness with good relief. Declined ice pack at this time. Breastfeeding infant every 2-3 hours, worked on different holds and hand expression overnight. Spouse at bedside and supportive. Will continue to monitor.

## 2019-08-08 NOTE — PROGRESS NOTES
[facility]    Obstetrics Post-Op / Progress Note    Assessment & Plan   Assessment:  -2 Days Post-Op  Procedure(s):   SECTION    Doing well.  Clean wound without signs of infection.  No immediate surgical complications identified.  No excessive bleeding  Pain well-controlled.    Plan:  Ambulation encouraged  Lactation consultation  Monitor wound for signs of infection  Pain control measures as needed  Reportable signs and symptoms dicussed with the patient  Anticipate discharge tomorrow    Brandee Jimenez     Interval History   Doing well.  Pain is well-controlled with oral meds.  No fevers.  No history of wound drainage, warmth or significant erythema.  Good appetite.  Denies chest pain, shortness of breath, nausea or vomiting.  Ambulatory without lightheadedness/dizziness.  Breastfeeding well --a bit fussy overnight.  No issues overnight.    Medications     dextrose 5% lactated ringers 125 mL/hr at 19 0455     - MEDICATION INSTRUCTIONS -       NO Rho (D) immune globulin (RhoGam) needed - mother Rh POSITIVE       - MEDICATION INSTRUCTIONS -       oxytocin in 0.9% NaCl 100 mL/hr (19 2355)     oxytocin in 0.9% NaCl         acetaminophen  975 mg Oral Q8H       Physical Exam   Temp: 98.2  F (36.8  C) Temp src: Oral BP: 94/49   Heart Rate: 90 Resp: 16 SpO2: 100 %      There were no vitals filed for this visit.  Vital Signs with Ranges  Temp:  [97.9  F (36.6  C)-98.2  F (36.8  C)] 98.2  F (36.8  C)  Heart Rate:  [73-94] 90  Resp:  [16] 16  BP: (85-98)/(40-50) 94/49  SpO2:  [100 %] 100 %  I/O last 3 completed shifts:  In: -   Out: 1400 [Urine:1400]    Uterine fundus is firm, non-tender and at the level of the umbilicus  Incision C/D/I  Extremities Non-tender    Data   Recent Labs   Lab Test 19   ABO O   RH Pos   AS Neg     Recent Labs   Lab Test 19  0919   HGB 9.8* 12.8     Recent Labs   Lab Test 18   RUQIGG Immune

## 2019-08-08 NOTE — PLAN OF CARE
VSS Pt using Ibuprofen and tylenol for pain control. Denies the need for narcotic pain medication. Up independently in the room. Breastfeeding infant on demand, baby latching well. Will continue to monitor.

## 2019-08-09 VITALS
HEART RATE: 79 BPM | SYSTOLIC BLOOD PRESSURE: 122 MMHG | OXYGEN SATURATION: 100 % | TEMPERATURE: 98 F | DIASTOLIC BLOOD PRESSURE: 63 MMHG | RESPIRATION RATE: 16 BRPM

## 2019-08-09 PROCEDURE — 25000132 ZZH RX MED GY IP 250 OP 250 PS 637: Performed by: OBSTETRICS & GYNECOLOGY

## 2019-08-09 RX ORDER — IBUPROFEN 800 MG/1
800 TABLET, FILM COATED ORAL EVERY 6 HOURS PRN
Qty: 60 TABLET | Refills: 1 | Status: SHIPPED | OUTPATIENT
Start: 2019-08-09 | End: 2020-12-22

## 2019-08-09 RX ORDER — ACETAMINOPHEN 325 MG/1
650 TABLET ORAL EVERY 4 HOURS PRN
Qty: 60 TABLET | Refills: 1 | Status: SHIPPED | OUTPATIENT
Start: 2019-08-09 | End: 2020-12-22

## 2019-08-09 RX ORDER — OXYCODONE HYDROCHLORIDE 5 MG/1
5-10 TABLET ORAL
Qty: 20 TABLET | Refills: 0 | Status: SHIPPED | OUTPATIENT
Start: 2019-08-09 | End: 2020-12-22

## 2019-08-09 RX ORDER — AMOXICILLIN 250 MG
1 CAPSULE ORAL 2 TIMES DAILY PRN
Qty: 60 TABLET | Refills: 1 | Status: SHIPPED | OUTPATIENT
Start: 2019-08-09 | End: 2020-12-22

## 2019-08-09 RX ADMIN — SENNOSIDES AND DOCUSATE SODIUM 2 TABLET: 8.6; 5 TABLET ORAL at 09:41

## 2019-08-09 RX ADMIN — IBUPROFEN 800 MG: 400 TABLET ORAL at 09:41

## 2019-08-09 RX ADMIN — ACETAMINOPHEN 975 MG: 325 TABLET, FILM COATED ORAL at 05:28

## 2019-08-09 RX ADMIN — IBUPROFEN 800 MG: 400 TABLET ORAL at 03:33

## 2019-08-09 RX ADMIN — OXYCODONE HYDROCHLORIDE 5 MG: 5 TABLET ORAL at 06:58

## 2019-08-09 RX ADMIN — OXYCODONE HYDROCHLORIDE 5 MG: 5 TABLET ORAL at 00:39

## 2019-08-09 RX ADMIN — OXYCODONE HYDROCHLORIDE 5 MG: 5 TABLET ORAL at 03:33

## 2019-08-09 NOTE — PLAN OF CARE
Vital signs are stable.  Pt using Ibuprofen, tylenol, and oxycodone for pain control.  Up independently in the room. Breastfeeding infant on demand, baby latching well. Will continue to monitor.

## 2019-08-09 NOTE — PROGRESS NOTES
[facility]    Obstetrics Post-Op / Progress Note    Assessment & Plan   Assessment:  -3 Days Post-Op  Procedure(s):   SECTION    Doing well.  Normal healing wound.  No excessive bleeding  Pain well-controlled.    Plan:  Ambulation encouraged  Lactation consultation  Monitor wound for signs of infection  Pain control measures as needed  Reportable signs and symptoms dicussed with the patient  Discharge later today    Brandee Jimenez     Interval History   Doing well.  Pain is well-controlled --a bit more sore this AM but controlled with meds.  No fevers.  No history of wound drainage, warmth or significant erythema.  Good appetite.  Denies chest pain, shortness of breath, nausea or vomiting.  Ambulatory.  Breastfeeding well.  No issues overnight    Medications     dextrose 5% lactated ringers 125 mL/hr at 19 0455     - MEDICATION INSTRUCTIONS -       NO Rho (D) immune globulin (RhoGam) needed - mother Rh POSITIVE       - MEDICATION INSTRUCTIONS -       oxytocin in 0.9% NaCl 100 mL/hr (19 2355)     oxytocin in 0.9% NaCl         acetaminophen  975 mg Oral Q8H       Physical Exam   Temp: 98.1  F (36.7  C) Temp src: Oral BP: 100/50 Pulse: 79 Heart Rate: 71 Resp: 16        There were no vitals filed for this visit.  Vital Signs with Ranges  Temp:  [98.1  F (36.7  C)-98.4  F (36.9  C)] 98.1  F (36.7  C)  Pulse:  [79] 79  Heart Rate:  [71-98.4] 71  Resp:  [16] 16  BP: ()/(49-52) 100/50  No intake/output data recorded.    Uterine fundus is firm, non-tender and at the level of the umbilicus  Incision C/D/I  Extremities Non-tender    Data   Recent Labs   Lab Test 19   ABO O   RH Pos   AS Neg     Recent Labs   Lab Test 19  0911 19   HGB 9.8* 12.8     Recent Labs   Lab Test 18   RUQIGG Immune

## 2019-08-09 NOTE — PLAN OF CARE
Vital signs stable. Fundus firm, U/1, bleeding scant, no clots. Incision well approximated, small bruise below incision, no drainage, open to air. Patient denies any dizziness or lightheadedness. Up in room independently, encouraged frequent voiding. Taking Tylenol, ibuprofen, and oxycodone for pain. Independent with breastfeeding and  cares.   supportive at the bedside. Will continue to monitor and notify MD as needed.

## 2019-08-09 NOTE — PLAN OF CARE
VSS Pt using Ibuprofen, tylenol, and oxycodone for pain control. Up independently in the room. Breastfeeding on demand, infant latching well. Discharging to home with  and baby.  Discharged at 1113

## 2019-08-09 NOTE — LACTATION NOTE
Routine visit with Bridgett, KIYA and baby.  Breastfeeding well. Getting ready for discharge.  Plan: Watch for feeding cues and feed every 2-3 hours and/or on demand. Continue to use feeding log to track intake and appropriate voids and stools. Take feeding log to first follow up appointment or weight check. Encourage skin to skin to promote frequent feedings, thermoregulation and bonding. Follow-up with healthcare provider or lactation consultant for questions or concerns.    has a pump for home.  No further questions at this time. Will follow as needed. Monae Martinez BSN, RN, PHN, RNC-MNN, IBCLC

## 2019-08-10 NOTE — DISCHARGE SUMMARY
Admit Date:     2019   Discharge Date:     2019      HOSPITAL COURSE:  Bridgett is a 33-year-old G1, P1 patient of the Midwife Service who was admitted to postpartum followed primary low-transverse  section was completed due to fetal intolerance to labor in occiput posterior position.  Bridgett was brought in for postdates cervical ripening and induction of labor and remained at 8 cm, but due to repetitive decelerations, underwent primary  section.  We delivered a healthy male infant with a weight of 8 pounds 15 ounces and Apgars of 8 and 9 via primary  section in the evening hours of 2019.  Please refer to operative dictation regarding details of this procedure.  Bridgett's postoperative hospital course was overall uncomplicated.  Pain was well controlled using a combination of oral as well as IV medications.  Hernandez catheter was discontinued on postoperative day #1 and she was able to void without issue or problem.  Bridgett was able to tolerate regular diet prior to discharge home.  Bridgett showed no signs or symptoms of infection throughout postoperative hospital course and was hemodynamically stable with a postoperative hemoglobin of 9.8 g/dL.  Bridgett was discharged to home on postoperative day #3 after meeting all requirements for discharge.  She verbalized understanding of the discharge instructions and was given a list of contact numbers should any issues or problems.  Bridgett will follow up with our clinic in 6 weeks for routine postpartum visit.         TEJA RAMOS MD             D: 08/10/2019   T: 08/10/2019   MT: VIDA      Name:     BRIDGETT MARTINEZ   MRN:      [id number]        Account:        [id number]   :      1985           Admit Date:     2019                                  Discharge Date: 2019      Document: [id number]       cc: Pia Ross MD

## 2019-08-26 ENCOUNTER — TELEPHONE (OUTPATIENT)
Dept: MIDWIFE SERVICES | Facility: CLINIC | Age: 34
End: 2019-08-26

## 2019-08-26 NOTE — TELEPHONE ENCOUNTER
Called patient at 3 weeks postpartum to answer any questions and to see how she is doing.  Encouraged to make/keep 6 week postpartum visit.  Reviewed postpartum depression warning signs.  Discussed breastfeeding. Nipples were really sore in the beginning but now she is doing well.   Is patient currently breastfeeding:  Yes    LITZY Glasgow, EFEM

## 2019-09-10 NOTE — PROGRESS NOTES
Midwife Postpartum 6 Week Visit    Bridgett Brady is a 33 year old here for a postpartum checkup.     Delivery date was 8/6/19. She had a c/s for fetal intolerance to labor of a viable boy, named Foreign, weight 8 pounds 14.9 oz., with none complications      Since delivery, she has been breast feeding.  She has not had any signs of infection, her lochia stopped after 4 weeks.  She has not had other complications.     She is voiding and having bowel movements without difficulty.       Contraception was discussed and patient desires Nexplanon.   She  has not had intercourse since delivery.   She complains of No  perineal discomfort.     Mood is Stable  Patient screened for postpartum depression.   Depression Rating was:   Last PHQ-9 score on record =   PHQ-9 SCORE 9/17/2019   PHQ-9 Total Score 1     Last GAD7 score on record =   BREANNE-7 SCORE 9/17/2019   Total Score 3     Alcohol Score = 2    ROS:  12 point review of systems negative other than symptoms noted below.       Current Outpatient Medications:      Prenatal Multivit-Min-Fe-FA (PRENATAL VITAMINS PO), Take 1 tablet by mouth, Disp: , Rfl:      acetaminophen (TYLENOL) 325 MG tablet, Take 2 tablets (650 mg) by mouth every 4 hours as needed for other (multimodal surgical pain management along with NSAIDS and opioid medication as indicated based on pain control and physical function.) (Patient not taking: Reported on 9/17/2019), Disp: 60 tablet, Rfl: 1     doxylamine (UNISOM) 25 MG TABS tablet, Take 25 mg by mouth At Bedtime, Disp: , Rfl:      ibuprofen (ADVIL/MOTRIN) 800 MG tablet, Take 1 tablet (800 mg) by mouth every 6 hours as needed for other (cramping) (Patient not taking: Reported on 9/17/2019), Disp: 60 tablet, Rfl: 1     oxyCODONE (ROXICODONE) 5 MG tablet, Take 1-2 tablets (5-10 mg) by mouth every 3 hours as needed for moderate to severe pain (Patient not taking: Reported on 9/17/2019), Disp: 20 tablet, Rfl: 0     senna-docusate (SENOKOT-S/PERICOLACE) 8.6-50 MG  "tablet, Take 1 tablet by mouth 2 times daily as needed for constipation (Patient not taking: Reported on 2019), Disp: 60 tablet, Rfl: 1    Current Facility-Administered Medications:      etonogestrel (IMPLANON/NEXPLANON) subdermal implant 68 mg, 1 each, Subdermal, Once, Jelly Rawls APRN CNM.   OB History    Para Term  AB Living   1 1 1 0 0 1   SAB TAB Ectopic Multiple Live Births   0 0 0 0 1      # Outcome Date GA Lbr Riki/2nd Weight Sex Delivery Anes PTL Lv   1 Term 19 41w6d  4.05 kg (8 lb 14.9 oz) M CS-LTranv  N WALE      Complications: Fetal Intolerance      Name: EARL MARTINEZ      Apgar1: 8  Apgar5: 9     Last pap:  No results found for: PAP      EXAM:  BP 90/58   Ht 1.702 m (5' 7\")   Wt 78.7 kg (173 lb 9.6 oz)   LMP 2018   BMI 27.19 kg/m    BMI: Body mass index is 27.19 kg/m .  Constitutional: healthy, alert and no distress  Neck: symmetrical, thyroid normal size, no masses present, no lymphadenopathy present.   Breast:normal without masses, tenderness or nipple discharge and no palpable axillary masses or adenopathy, deferred, patient lactating.  Abdomen: soft, non-tender, diastasis 1 FB's    PELVIC EXAM:  deferred      ASSESSMENT:   Normal postpartum exam after  and c/s for abnormal FHT.    ICD-10-CM    1. Routine postpartum follow-up Z39.2    2. Insertion of implantable subdermal contraceptive Z30.017    3. Nexplanon insertion Z30.017 etonogestrel (IMPLANON/NEXPLANON) subdermal implant 68 mg     INSERTION NON-BIODEGRADABLE DRUG DELIVERY IMPLANT         PLAN:  Results for orders placed or performed during the hospital encounter of 19   Treponema Abs w Reflex to RPR and Titer   Result Value Ref Range    Treponema Antibodies Nonreactive NR^Nonreactive   CBC with platelets differential   Result Value Ref Range    WBC 14.5 (H) 4.0 - 11.0 10e9/L    RBC Count 3.89 3.8 - 5.2 10e12/L    Hemoglobin 12.8 11.7 - 15.7 g/dL    Hematocrit 37.0 35.0 - 47.0 %    MCV " 95 78 - 100 fl    MCH 32.9 26.5 - 33.0 pg    MCHC 34.6 31.5 - 36.5 g/dL    RDW 13.2 10.0 - 15.0 %    Platelet Count 250 150 - 450 10e9/L    Diff Method Automated Method     % Neutrophils 71.4 %    % Lymphocytes 17.6 %    % Monocytes 9.4 %    % Eosinophils 0.6 %    % Basophils 0.2 %    % Immature Granulocytes 0.8 %    Absolute Neutrophil 10.4 (H) 1.6 - 8.3 10e9/L    Absolute Lymphocytes 2.6 0.8 - 5.3 10e9/L    Absolute Monocytes 1.4 (H) 0.0 - 1.3 10e9/L    Absolute Eosinophils 0.1 0.0 - 0.7 10e9/L    Absolute Basophils 0.0 0.0 - 0.2 10e9/L    Abs Immature Granulocytes 0.1 0 - 0.4 10e9/L   Hemoglobin   Result Value Ref Range    Hemoglobin 9.8 (L) 11.7 - 15.7 g/dL   ABO/Rh type and screen   Result Value Ref Range    ABO O     RH(D) Pos     Antibody Screen Neg     Test Valid Only At [facility]        Specimen Expires 2019        Return as needed or at time of next expected pap, pelvic, or breast exam.  Teaching: exercise, birth control and mental health  Family Planning: Nexplanon today  Encourage Kegels and abdominal exercise.  Continue a multivitamin/prenatal supplement, especially if breastfeeding.  Pap smear was not obtained today.  Postpartum Hgb was not done today.    GDM:  Fasting and 2hr GCT needed:  No  If yes, remind need for annual fasting BG and nutrition/exercise recommendations.    Return to clinic:  In one year or as needed.    Jelly Rawls, DNP, APRN, CNM            Nexplanon Insertion:    Is a pregnancy test required: No.  Was a consent obtained?  Yes    Subjective: Bridgett Brady is a 33 year old  presents for Nexplanon.    Patient has been given the opportunity to ask questions about all forms of birth control, including all options appropriate for Bridgett Brady. Discussed that no method of birth control, except abstinence is 100% effective against pregnancy or sexually transmitted infection.     Bridgett Brady understands she may have the Nexplanon removed at any time  "and it should be removed by a health care provider.    The entire insertion procedure was reviewed with the patient, including care after placement.    Patient's last menstrual period was 09/26/2018. No intercourse since delivery. No allergy to betadine or shellfish. Recent STD screening  No results found for: HCG      BP 90/58   Ht 1.702 m (5' 7\")   Wt 78.7 kg (173 lb 9.6 oz)   LMP 09/26/2018   BMI 27.19 kg/m      PROCEDURE NOTE: -- Nexplanon Insertion    Reason for Insertion: contraception    Patient was placed supine with left arm exposed.  Chevy was made 8-10 cm above medial epicondyle and a guiding chevy 4 cm above the first.  Arm was prepped with Betadine. Insertion point was anesthetized with 2.5 mL 1% lidocaine. After stretching the skin with thumb and index finger around the insertion site, skin punctured with the tip of the needle inserted at 30 degrees and then lowered to horizontal position. The needle was then advanced to its full length. Applicator was then stabilized and slider was unlocked. Slider was pulled back until it stopped and then removed.    Correct placement of the implant was confirmed by palpation in the patient's arm and visualizing the purple top of the obturator.   Bandage and pressure dressing applied to insertion site.    Lot # [id number]  Exp: [id number]  : [id number]  NDC: 4687-0499-95    EBL: minimal    Complications: none    ASSESSMENT:     ICD-10-CM    1. Routine postpartum follow-up Z39.2    2. Insertion of implantable subdermal contraceptive Z30.017    3. Nexplanon insertion Z30.017 etonogestrel (IMPLANON/NEXPLANON) subdermal implant 68 mg     INSERTION NON-BIODEGRADABLE DRUG DELIVERY IMPLANT        PLAN:    Given 's handouts, including when to have Nexplanon removed, list of danger s/sx, side effects and follow up recommended. Encouraged condom use for prevention of STD. Back up contraception advised for 7 days. Advised to call for any fever, for prolonged or " severe pain or bleeding, abnormal vaginal dischage. She was advised to use pain medications (ibuprofen) as needed for mild to moderate pain.     LITZY FigueroaM

## 2019-09-17 ENCOUNTER — PRENATAL OFFICE VISIT (OUTPATIENT)
Dept: MIDWIFE SERVICES | Facility: CLINIC | Age: 34
End: 2019-09-17
Payer: COMMERCIAL

## 2019-09-17 VITALS
SYSTOLIC BLOOD PRESSURE: 90 MMHG | WEIGHT: 173.6 LBS | HEIGHT: 67 IN | BODY MASS INDEX: 27.25 KG/M2 | DIASTOLIC BLOOD PRESSURE: 58 MMHG

## 2019-09-17 DIAGNOSIS — Z30.017 NEXPLANON INSERTION: ICD-10-CM

## 2019-09-17 DIAGNOSIS — Z30.017 INSERTION OF IMPLANTABLE SUBDERMAL CONTRACEPTIVE: ICD-10-CM

## 2019-09-17 PROBLEM — Z98.891 STATUS POST CESAREAN DELIVERY: Status: RESOLVED | Noted: 2019-08-06 | Resolved: 2019-09-17

## 2019-09-17 PROBLEM — Z34.03 ENCOUNTER FOR SUPERVISION OF NORMAL FIRST PREGNANCY IN THIRD TRIMESTER: Status: RESOLVED | Noted: 2019-04-08 | Resolved: 2019-09-17

## 2019-09-17 PROBLEM — O48.0 POST-TERM PREGNANCY, 40-42 WEEKS OF GESTATION: Chronic | Status: RESOLVED | Noted: 2019-08-05 | Resolved: 2019-09-17

## 2019-09-17 PROBLEM — Z97.5 NEXPLANON IN PLACE: Status: ACTIVE | Noted: 2019-09-17

## 2019-09-17 PROCEDURE — 11981 INSERTION DRUG DLVR IMPLANT: CPT | Performed by: ADVANCED PRACTICE MIDWIFE

## 2019-09-17 PROCEDURE — 99207 ZZC POST PARTUM EXAM: CPT | Mod: 25 | Performed by: ADVANCED PRACTICE MIDWIFE

## 2019-09-17 ASSESSMENT — ANXIETY QUESTIONNAIRES
6. BECOMING EASILY ANNOYED OR IRRITABLE: NOT AT ALL
2. NOT BEING ABLE TO STOP OR CONTROL WORRYING: SEVERAL DAYS
GAD7 TOTAL SCORE: 3
3. WORRYING TOO MUCH ABOUT DIFFERENT THINGS: SEVERAL DAYS
1. FEELING NERVOUS, ANXIOUS, OR ON EDGE: NOT AT ALL
5. BEING SO RESTLESS THAT IT IS HARD TO SIT STILL: NOT AT ALL
7. FEELING AFRAID AS IF SOMETHING AWFUL MIGHT HAPPEN: SEVERAL DAYS
IF YOU CHECKED OFF ANY PROBLEMS ON THIS QUESTIONNAIRE, HOW DIFFICULT HAVE THESE PROBLEMS MADE IT FOR YOU TO DO YOUR WORK, TAKE CARE OF THINGS AT HOME, OR GET ALONG WITH OTHER PEOPLE: NOT DIFFICULT AT ALL

## 2019-09-17 ASSESSMENT — MIFFLIN-ST. JEOR: SCORE: 1535.07

## 2019-09-17 ASSESSMENT — PATIENT HEALTH QUESTIONNAIRE - PHQ9
5. POOR APPETITE OR OVEREATING: NOT AT ALL
SUM OF ALL RESPONSES TO PHQ QUESTIONS 1-9: 1

## 2019-09-17 NOTE — NURSING NOTE
"No chief complaint on file.      Initial BP 90/58   Ht 1.702 m (5' 7\")   Wt 78.7 kg (173 lb 9.6 oz)   LMP 2018   BMI 27.19 kg/m   Estimated body mass index is 27.19 kg/m  as calculated from the following:    Height as of this encounter: 1.702 m (5' 7\").    Weight as of this encounter: 78.7 kg (173 lb 9.6 oz).  BP completed using cuff size: regular    Questioned patient about current smoking habits.  Pt. has never smoked.          The following HM Due: NONE    Ren Duenas MA             "

## 2019-09-17 NOTE — PATIENT INSTRUCTIONS
What Nexplanon Users May Expect    Nexplanon is well tolerated and has a low early-removal rate.    Insertion site complications, such as prolonged pain or infection, are rare. Removal is occasionally difficult, and rarely requires a surgical procedure in the operating room.    Menstrual changes are common with Nexplanon. Bleeding may become more or less frequent or heavy, or absent. The bleeding pattern after the first three months is predictive of future bleeding, but the pattern may change at any time. Average bleeding is 18 days over 3 months. Over 50% of women experience rare or absent bleeding over the three year period, while 25% experience frequent or prolonged bleeding.    In clinical studies, users gained 3.7 pounds over two years. It is unknown what portion of this weight gain is related to Nexplanon    Women with a history of depressed mood may have worsening on Nexplanon, and may need to have the device removed.    Ectopic pregnancy is rare while using Nexplanon, but if you develop severe lower abdominal pain, see your healthcare provider.    Return to baseline ovulation patterns is seen 7-14 days after removal of Nexplanon.    Rarely, headaches and acne have also led to device removal.    Nexplanon should be removed if jaundice occurs, see your healthcare provider.    Nexplanon may be less effective in women who weight more than 130% of their ideal body weight.    Nexplanon does not protect against HIV or STDs.    Use a back-up method of birth control for the first 7 days after insertion of Nexplanon.    Please call [facility] at [phone] if you have questions or concerns.    For more complete information:  [email or web address]        COMMON BREASTFEEDING PROBLEMS    Women can have several different problems when they breastfeed.  Women often quit breastfeeding when these problems occur, but most problems can be treated and women can continue  to breastfeed their babies.    Engorgement:    This is when the breasts are too full of milk.  Breasts can be swollen, hard, warm and painful. This can also make latch more difficult for your baby.  The only proven way to reduce this is to hand express or pump to let some milk out between feedings. Letting out too much milk will exacerbate the problem by producing even more milk.    You can also try:    Cold packs    Taking pain relieving medicine such as acetaminophen (Tylenol) or ibuprofen (Advil, Motrin)    Take a warm shower    Massage your breasts to start milk flow.     Breastfeed your baby on demand, make sure baby is emptying breast completely    Prevent engorgement by limiting pumping if you are breastfeeding on demand    Sore/Painful Nipples:    Some nipple soreness is normal during the first minute of breast feeding. Pain that is lasting longer is not normal after breast feeding is established.  Pain can be caused by nipple cracks and blisters.  This is usually due to an incorrect latch.    The best way to reduce nipple soreness is to make sure your baby is latching correctly on your breast.    You can also try:    Lanolin ointment    APNO (All purpose nipple ointment) which you need a prescription for    Apply a cold or warm cloth to your nipples    Wear breast pads to protect your nipples    Let your nipples air dry after feedings    Cleanse nipple with unscented soap      Blocked Milk Ducts:    A blocked milk duct can cause a red, painful lump in your breast.  It can also cause a white plug at the end of your nipple. If you have a blocked milk duct, breastfeed more often.  Make sure your baby empties your breast after each feeding.  Start your feedings with the breast with the plugged duct and try various positions to empty the breast as much as you can. Massage your plugged duct in a warm shower to try to unplug it. If these methods do not work you may be at risk for a breast infection.     Breast  Infection:    A breast infection is called Mastitis.  In addition to having a hard, red, swollen area of your breast you will most likely will also have fever and chills and not feel well.  Engorgement and incomplete breast emptying can contribute to the problem and make your symptoms worse.    DO NOT STOP breastfeeding when you have mastitis!    Make an appointment with your midwife to be seen as soon as possible.  You will be started immediately on an antibiotic.    You may also:    Use pain medication such as  such as acetaminophen (Tylenol) or ibuprofen (Advil, Motrin)    Massage your breasts during feedings    Use warm compress to encourage milk let down prior to feeding     Use a breast pump to empty your breasts more completely after feedings    Rest for the next day or so and increase your fluids   -Take Chamomile tea, Hops infusion or lemon balm infusion     Watch for increased pain, increasing size of the breast, increasing firmness or a developing mass to suggest an abscess formation.    You should improve quickly on antibiotics.  If you are not improving with in the next 1-2 days or your fever increases > 101 F call the clinic.      For reliable information on breast feeding visit:    [employer]  Http://www.[facility].org/ ([facility])      If you are struggling with breastfeeding and need extra support you may also consider seeking the advice of a lactation consultation.    Please call with further concerns:    [facility]  [phone]

## 2019-09-18 ASSESSMENT — ANXIETY QUESTIONNAIRES: GAD7 TOTAL SCORE: 3

## 2020-03-01 ENCOUNTER — HEALTH MAINTENANCE LETTER (OUTPATIENT)
Age: 35
End: 2020-03-01

## 2020-04-28 ENCOUNTER — TELEPHONE (OUTPATIENT)
Dept: MIDWIFE SERVICES | Facility: CLINIC | Age: 35
End: 2020-04-28

## 2020-04-28 NOTE — TELEPHONE ENCOUNTER
When patient was pumping this morning, after she was done she noticed that the milk was bloody.  There has been no trauma and she has no other symptoms.  Baby was born August 2017    Routing to provider to advise.  Marely Bello RN on 4/28/2020 at 9:17 AM

## 2020-04-28 NOTE — TELEPHONE ENCOUNTER
This is common. If there no other abnormal s/sx (fever, breast tenderness, etc) I am not worried. It may happen 1 or 2 more times, but normally resolves fast. She can feed the baby the milk.     Just let me know if there are other questions or concerns.    Thanks - Jelly

## 2020-04-28 NOTE — TELEPHONE ENCOUNTER
Called and left detailed vm informing pt of midwife feedback below. Encouraged pt to call with any questions.  Maddison Salazar RN on 4/28/2020 at 10:14 AM

## 2020-08-06 ENCOUNTER — OFFICE VISIT (OUTPATIENT)
Dept: MIDWIFE SERVICES | Facility: CLINIC | Age: 35
End: 2020-08-06
Payer: COMMERCIAL

## 2020-08-06 VITALS — SYSTOLIC BLOOD PRESSURE: 102 MMHG | WEIGHT: 160 LBS | BODY MASS INDEX: 25.06 KG/M2 | DIASTOLIC BLOOD PRESSURE: 58 MMHG

## 2020-08-06 DIAGNOSIS — Z30.46 NEXPLANON REMOVAL: Primary | ICD-10-CM

## 2020-08-06 PROCEDURE — 11982 REMOVE DRUG IMPLANT DEVICE: CPT | Performed by: ADVANCED PRACTICE MIDWIFE

## 2020-08-06 NOTE — PROGRESS NOTES
Nexplanon Removal:     Is a pregnancy test required: No.  Was a consent obtained?  Yes    Bridgett Brady is here for removal of etonogestrel implant Nexplanon/Implanon    Indication: trying to conceive        Preoperative Diagnosis: etonogestrel implant  Postoperative Diagnosis: etonogestrel implant removed    Technique: On the left arm  Skin prep Betadine  Anesthesia 1% lidocaine, without epi  Procedure: Small incision (<5mm) was made at distal end of palpable implant, curved hemostat or mosquito forceps was used to isolate the implant and bring it to the incision, the fibrous capsule containing the implant  was incised and the Implant was removed intact.    EBL: minimal  Complications:  Yes  Tolerance:  Pt tolerated procedure well and was in stable condition.   Dressing:    A pressure bandage was placed for the next 12-24 hours.    Contraception was discussed and patient chose the following method none      Follow up: Pt was instructed to call if bleeding, severe pain or foul smell.     Sully Villasenor CNM

## 2020-11-30 ENCOUNTER — TELEPHONE (OUTPATIENT)
Dept: MIDWIFE SERVICES | Facility: CLINIC | Age: 35
End: 2020-11-30

## 2020-11-30 NOTE — TELEPHONE ENCOUNTER
Pt experienced severe cramping for a couple of hours.  Pt states she is 5w pregnant (LMP 10/26), has new OB appt 12/28/2020.    Was laying in bed, severe cramping started and she was unable to get up to go to the bathroom.   had to help her.  Returned to bed and cramping subsided after a couple of hours.    Described as very bad menstrual cramps, no back or side pain.  No increase in discharge, no bleeding.  No N/V.  Denies fever, but didn't take it.  Did not take tylenol.    Pt just concerned as it was pretty intense for those couple of hours, but reassured since she didn't have any bleeding.    Recommended tylenol, warm heating pad if cramping returns.  If severe, go to the ED.    Routing to Midwives for review/recommdations.    Irene Wade RN on 11/30/2020 at 11:54 AM

## 2020-11-30 NOTE — TELEPHONE ENCOUNTER
Called pt and left vm with detailed message reiterating previous recommendations for home care and when to seek ER eval.  Call with questions.  Maddison Salazar RN on 11/30/2020 at 1:44 PM

## 2020-12-14 ENCOUNTER — HEALTH MAINTENANCE LETTER (OUTPATIENT)
Age: 35
End: 2020-12-14

## 2020-12-22 ENCOUNTER — PRENATAL OFFICE VISIT (OUTPATIENT)
Dept: NURSING | Facility: CLINIC | Age: 35
End: 2020-12-22
Payer: COMMERCIAL

## 2020-12-22 DIAGNOSIS — Z13.79 GENETIC SCREENING: ICD-10-CM

## 2020-12-22 DIAGNOSIS — O36.80X0 PREGNANCY WITH INCONCLUSIVE FETAL VIABILITY: Primary | ICD-10-CM

## 2020-12-22 DIAGNOSIS — O09.91 SUPERVISION OF HIGH RISK PREGNANCY IN FIRST TRIMESTER: ICD-10-CM

## 2020-12-22 PROCEDURE — 99207 PR NO CHARGE LOS: CPT

## 2020-12-22 NOTE — PROGRESS NOTES
SUBJECTIVE:     HPI:    This is a 35 year old female patient,  who presents for her first obstetrical visit.    LIBERTAD: 2021, by Last Menstrual Period.  She is 8w2d weeks at the time of the nurse visit on 20.  Her cycles are regular.  Her last menstrual period was normal.   Since her LMP, she has experienced  nausea, emesis and fatigue).   She denies abdominal pain, headache, loss of appetite, vaginal discharge, dysuria, pelvic pain, urinary urgency, lightheadedness, urinary frequency, vaginal bleeding, hemorrhoids and constipation.    Additional History: Prior  section.  Patient aware that she may have to transfer to OB provider.    Have you travelled during the pregnancy?No  Have your sexual partner(s) travelled during the pregnancy?No      HISTORY:   Planned Pregnancy: Yes  Marital Status:   Occupation: retail  Living in Household: Spouse and Children    Past History:  Her past medical history   Past Medical History:   Diagnosis Date     Depressive disorder     no meds   .      She has a history of  prior  section    Since her last LMP she denies use of alcohol, tobacco and street drugs.    Past medical, surgical, social and family history were reviewed and updated in [employer].        Current Outpatient Medications   Medication     Prenatal Multivit-Min-Fe-FA (PRENATAL VITAMINS PO)     doxylamine (UNISOM) 25 MG TABS tablet     No current facility-administered medications for this visit.        ROS:   12 point review of systems negative other than symptoms noted below or in the HPI.  Constitutional: Fatigue  Gastrointestinal: Nausea and Vomiting    Nurse phone visit completed. Prenatal book and folder (containing standard educational hand-outs and brochures) will be given at the next visit to patient. Information in folder reviewed over the phone. Questions answered. Brochure given on optional screening available to assess chromosomal anomalies. Pt desires NIPT. Pt advised to  call the clinic if she has any questions or concerns related to her pregnancy. Prenatal labs future ordered. New prenatal visit scheduled on 12/28/20 with Jelly Rawls CNM.    No results found for: LUPE Bello RN on 12/22/2020 at 8:46 AM  TR[facility]AGE

## 2020-12-23 NOTE — PROGRESS NOTES
SUBJECTIVE:      HPI:     This is a 35 year old female patient,  who presents for her first obstetrical visit.     LIBERTAD: 2021, by Last Menstrual Period.  She is 8w2d weeks at the time of the nurse visit on 20.  Her cycles are regular.  Her last menstrual period was normal.   Since her LMP, she has experienced  nausea, emesis and fatigue).     She denies abdominal pain, headache, loss of appetite, vaginal discharge, dysuria, pelvic pain, urinary urgency, lightheadedness, urinary frequency, vaginal bleeding, hemorrhoids and constipation.     Additional History: Prior  section on 19 for fetal intolerance with Lana Mendez CNM/ Brandee Jiemnez MD. Thinking about a repeat c/s; she understands she will have to transfer to an MD. Discussed timing of transfer may depend on if she wants a TOLAC or repeat c/s.    She has had emesis. Resolves with eating small amounts. She does not need medicine.     Son Foreign is 17 months and very active. She is very happy.     No other concerns.     Jelly Rawls, DNP, APRN, CNM       Have you travelled during the pregnancy?No  Have your sexual partner(s) travelled during the pregnancy?No        HISTORY:   Planned Pregnancy: Yes  Marital Status:   Occupation: retail  Living in Household: Spouse and Children     Past History:  Her past medical history   Past Medical History        Past Medical History:   Diagnosis Date     Depressive disorder       no meds      .       She has a history of  prior  section     Since her last LMP she denies use of alcohol, tobacco and street drugs.     Past medical, surgical, social and family history were reviewed and updated in [facility].               Current Outpatient Medications   Medication     Prenatal Multivit-Min-Fe-FA (PRENATAL VITAMINS PO)     doxylamine (UNISOM) 25 MG TABS tablet      No current facility-administered medications for this visit.          ROS:   12 point review of systems negative other than  symptoms noted below or in the HPI.  Constitutional: Fatigue  Gastrointestinal: Nausea and Vomiting     Nurse phone visit completed. Prenatal book and folder (containing standard educational hand-outs and brochures) will be given at the next visit to patient. Information in folder reviewed over the phone. Questions answered. Brochure given on optional screening available to assess chromosomal anomalies. Pt desires NIPT. Pt advised to call the clinic if she has any questions or concerns related to her pregnancy. Prenatal labs future ordered. New prenatal visit scheduled on 20 with Jelly Rawls CNM.     No results found for: PAP     Marely Bello RN on 2020 at 8:46 AM  TRIAGE    History of GDM: No,  PTL : No,  History of HTN in pregnancy: No,  Thrombocytopenia: No,  Shoulder dystocia: No,  Vacuum Extraction: No  PPH: No   3rd of 4th degree laceration: No.   Other complications: Yes - c/s for fetal intolerance to labor      PHYSICAL EXAM  Vitals: BP 98/50   Wt 73 kg (161 lb)   LMP 10/25/2020   BMI 25.22 kg/m    BMI= Body mass index is 25.22 kg/m .     GENERAL:  35 year old pleasant pregnant female, alert, cooperative and well groomed.  NECK:  Thyroid without enlargement and nodules.  Lymph nodes not palpable.   LUNGS:  Unlabored breathing.  HEART:  RRR without murmur.  ABDOMEN: Soft without masses or tenderness.  Well healed scar from  section.  LOWER EXTREMITIES: No edema. No significant varicosities.    ASSESSMENT/PLAN:    IUP at 9w1d    ICD-10-CM    1. Supervision of high risk pregnancy in first trimester  O09.91 [facility]     *UA reflex to Microscopic     Urine Culture Aerobic Bacterial     Treponema Abs w Reflex to RPR and Titer     Rubella Antibody IgG Quantitative     HIV Antigen Antibody Combo     CBC with platelets     Hepatitis B surface antigen     ABO/Rh type and screen         Genetic Testing reviewed and discussed, patient desires. [facility]  referral sent.     COUNSELING    Instructed on use of triage nurse line and contacting the on call CNM after hours in an emergency.     Symptoms of N&V and fatigue usually start to resolve around 12-16 weeks     Reviewed CNM philosophy, call schedule for labor and delivery, and FSH for delivery    1st OB handout given outlining appointment spacing and CNM information    Reviewed exercise and nutrition    Recommend to gain 25-35 pounds with her pregnancy.    Discussed OTC medications. OB med list given    Encouraged patient to take PNV's/DHA    Travel precautions discussed, no air travel after 36 weeks and Zika Virus discussed    Will call patient with lab results when available      F/U to be addressed next visit: GC/CT     Will return to the clinic in 4 weeks for her next routine prenatal check.  Will call to be seen sooner if problems arise.    Jelly Rawls, DNP, APRN, CNM

## 2020-12-27 PROBLEM — Z97.5 NEXPLANON IN PLACE: Status: RESOLVED | Noted: 2019-09-17 | Resolved: 2020-12-27

## 2020-12-27 NOTE — PATIENT INSTRUCTIONS
Thank you for coming to see the Midwives at the   [facility]!      Please take prenatal vitamin with DHA and vitamin D3 4,000-5,000 international unit(s) once daily during the entire pregnancy.      We will notify you about your labs that were drawn today once we get the results back.  If you have MyChart your lab results will be posted there.      Someone from the clinic will call you personally or send you a [employer] message with your results.      If you need any refills of medications please call your pharmacy and they will contact us.      If you have a medical emergency please call 911.      If you have any concerns about today's visit, wish to schedule another appointment, or have an urgent medical concern please call our office at [phone]. You can also make appointments through [employer].      After hours you may also call the clinic number above to be connected with [facility] after hours triage nurse.  The nurse can page the midwife on call if needed. There is always a midwife on call 24 hours a day.    Prenatal Care Recommendations:    Before 14 weeks: Dating ultrasound, genetic testing       This ultrasound helps us determine your dates accurately. Innatal (genetic screening test) can be drawn anytime after 10 weeks of gestation.    16 weeks: Optional genetic testing single AFP       This testing helps understand your baby's risk for some genetic abnormalities.    18-22 weeks:  Screening anatomy ultrasound       This testing will look for early growth abnormalities, placenta location, and may tell the baby's gender if you wish to find out.    24-27 weeks: One hour diabetes test (GCT) and complete blood count       This test helps identify diabetes of pregnancy or gestational diabetes.  We also look   at the iron in your blood and how well your blood clots.    28 - 36 weeks: Tetanus shot (Tdap)       This shot helps protect you and your baby from whooping cough.    36 weeks and  later: Group B Strep test (GBS)       This test helps predict if you need antibiotics in labor to prevent infection for your baby.    Anytime September to April:  Flu shot       This shot helps protect you and your family from the flu.  This is especially important during pregnancy.        The typical schedule after your first visit today you can expect:     Visit 2 - 12-16 weeks  Visit 3 - 20 weeks  Visit 4 - 24 weeks  Visit 5 - 28 weeks  Visit 6 - 30 weeks  Visit 7 - 32 weeks  Visit 8 - 34 weeks  Visit 9 - 36 weeks  Weekly after 36 weeks until delivery.        Any time during or after your pregnancy you may experience increased depression and/or mood changes.    We are here to support you. Please contact us if you are:    Feeling anxious    Overwhelmed or sad     Trouble sleeping    Crying uncontrollably    Trouble caring for yourself or baby.    Any thoughts of hurting yourself, your baby, or anyone else    If anything comes up between your visits or you have concerns please don't hesitate to contact us.    Secure access to your medical record:  Use [employer] (secure email communication and access to your chart) to send your primary care provider a message or make an appointment. Ask someone on your Team how to sign up for [employer]. To log on to [email or web address] or for more information in [employer] please visit the website at [email or web address].       Certified Nurse Midwife (CNM) Team    DONY Forrester CNM Melissa Kitzman APRN, CNM, [facility]  LITZY Mahmood DNP, LITZY Mason DNP, DONY      Again, thank you for choosing the midwives at [facility].  We are excited to be a part of your pregnancy. Please let us know how we can best partner with you to improve your and your family's health.      ------------------------------------------      Remedies for nausea and vomiting with pregnancy      Eat small frequent meals every 2-3 hours if possible.        Avoid food at extremes of temperature and drinks with carbonation.      Eat foods that appeal to you, avoiding fats and spicy foods.      Avoid liquids with foods.  Drink liquids 30-60 minutes before or after eating and sip slowly.      Bread, pasta, crackers, potatoes, and rice tend to be tolerated the best.      Don't worry about what you eat in the first 3 months, it is more important that you can eat and keep it down.       Try flat ginger ale or ginger tea      Before rising in the morning, eat a small amount of crackers or dry toast.      Peppermint tea      Ginger is a herbal remedy for nausea and you can use it in any form.  There are ginger tablets you can purchase.  The dose 1000 mg a day in divided doses.       You may also try doxylamine (Unisom) 12.5 mg three times a day which is a sleeping medication along with Vitamin B6 25 mg three times a day.  This combination takes up to a week to work so give it some time.       Benadryl (diphenhydramine) 25-50 mg every 8 hour or Dramamine (dimenhydrinate)  mg by mouth every 4-6 hours. Both of these medication may cause some drowsiness      Other things that may help include an Accupressure band and acupuncture      If these methods fail there are many prescriptions that we can try    If you begin to vomit more than 5 or 6 times a day and feel that you are unable to keep anything down, call the [facility] at [phone]      ------------------------------------------      Constipation    Constipation can be caused by many factors such as poor diet, lack of activity, and medications. Often times women experience more constipation during pregnancy due to decreased intestinal motility.    DRINK TONS OF WATER! 2.5 liters (4 pints) of water per day      Activity is very important. Increase your daily activity to at least 20-60 minutes. This increases blood flow to your gut and improves bowel function    Limit caffeine and alcohol  intake    Avoid foods you've identified as constipating    Increase fiber intake: there are ways to increase you fiber through your diet but there are also OTC agents such as Metamucil or Benfiber that you can supplement your diet with    Use probiotics such as Florastor and/or prebiotics such as oligosaccharides    Consider using an Omega 3 supplement, flaxseed and markus seeds are great sources    Plan adequate time for elimination, it is most effective right after activity, and it may be beneficial to plan a consistent time daily    Food Suggestions      Eat beans, nuts, whole grain breads and cereals, oats, barley, figs, apples with skin, raisins, green leafy vegetables, fresh and dried fruits (especially grapes), prunes or prune juice    Eat popcorn nightly    Eat 2-3 salads per day    Add a pinch of cayenne pepper to food    1-2 tbsp of olive oil per day    Lemon juice and/or honey in warm water every morning before breakfast    Decrease red meat, refined white flour products like white rice, white bread and pasta    Tea infusions of: rosemary, chamomile, lemon balm, senna leaf, alfalfa, fennel seeds, lavender, cascara, dandelion, licorice root tea, psyllium seeds, marshmallow root    Other remedies      Increase Vitamin B and E (800 IU if not pregnant, 400 IU if pregnant per day) and potassium, calcium, and magnesium supplements      If these remedies fail, medications are an option as well. Please talk with your midwife if you continue to struggle with constipation. If you are pregnant please double check with us before starting any medications!      --------------------------------------------        Fish Safety During Pregnancy    Often time's women worry about eating fish during pregnancy. Fish can be totally safe to eat during pregnancy.However, some fish may contain contaminants that could harm you or your baby if you eat certain types of fish or eat fish too often. Below is a list of which types of fish  to eat, how often to eat fish, and which types of fish to avoid while pregnant.    Reasons to eat fish:    Fish is a great source of protein, vitamins, and minerals.    The oils found in fish are important to unborn and breast fed babies    Eating fish may play a role in the prevention of heart disease in adults       Fish to EAT while pregnant   2 servings per week of any of these types of fish    Catfish (farm raised)  Cod    Crab    Chen    Flatfish   Oysters    Live Oak   [place] ([place], not [place] lakes)     Sardines   Scallops    Shrimp   Tilapia   1 serving per week of any of these fish    Canned LIGHT tuna     [place] caught-        [place]ppie   Diana    Yellow Perch   1 serving per MONTH of these types of fish    Canned WHITE tuna  [place]ector    Grouper   Halibut    [place]    [place] Rough[place]    Tuna steak   [place] caught-    Bass    Catfish    [place] smaller than 20 inches    [place] smaller than 30 inches         Servings of fish should be based on your weight, 1 oz for every 20 lbs of body weight. For example: 130 lb person can safely eat 7 oz     150 lb person can safely eat 8 oz     170 lb person can safely eat 9 oz      Make sure to space out meals with fish throughout the month, don't eat all your fish meals for the month within a few days.       Fish to Avoid while Pregnant:      Shark   T[place]hawn Mackerel    Swordfish  Raw Sushi-any type of fish    Tile fish         [place] Caught:      [place] longer than 20 in    [place] longer than 30 in    Musky      Contaminants:      Mercury comes from air pollution and small amounts of mercury can damage the brain that is just starting to form or grow. Too much mercury may affect a child's behavior and lead to learning problems later in life. Too much mercury in adults and older children may cause tingling, numbness in hands and feet, or vision changes    PCBs a man-made substance once used in electrical transformers but was banned in 1967  although it can still be found in the [place] and the [place]. A baby who are exposed to PCBs during pregnancy may have a lower birth weight, reduced head size, and delayed physical development. Exposure to PCBs may also cause cancer    PFOS is a man-made chemical to make products that resist heat, oil, stains, and grease. Studies in lab animals exposed to low levels of PFOS showed decreas HDL (good cholesterol) and changes in thyroid hormone levels. The concern about PFOS is with long-term exposure such as consuming large amounts over a long period of time     Mercury and PFOS cannot be removed through cooking or cleaning. By removing fat when cleaning and cooking you can reduce PCBs.      For more information and up to date information about fish safety in [place] please contact the [place] Department of Health ([facility]) or the Department of Natural Resources (DNR):    [email or web address].  DNR: [phone]  [facility]: [phone]      -------------------------------------------      Recommendations for total and rate of weight gain during pregnancy, by prepregnancy BMI    Total weight gain Rates of weight gain*  2nd and 3rd trimester   Prepregnancy BMI Range in kg Range in lbs Mean (range) in kg/week Mean (range) in lbs/week   Underweight (<18.5 kg/m2) 12.5-18 28-40 0.51 (0.44-0.58) 1 (1-1.3)   Normal weight (18.5-24.9 kg/m2) 11.5-16 25-35 0.42 (0.35-0.50) 1 (0.8-1)   Overweight (25.0-29.9 kg/m2) 7-11.5 15-25 0.28 (0.23-0.33) 0.6 (0.5-0.7)   Obese (?30.0 kg/m2) 5-9 11-20 0.22 (0.17-0.27) 0.5 (0.4-0.6)   BMI: body mass index.  * Calculations assume a 0.5-2 kg (1.1-4.4 lbs) weight gain in the first trimester.  * To calculate BMI go to [email or web address].com/bmi/

## 2020-12-28 ENCOUNTER — PRENATAL OFFICE VISIT (OUTPATIENT)
Dept: MIDWIFE SERVICES | Facility: CLINIC | Age: 35
End: 2020-12-28
Payer: COMMERCIAL

## 2020-12-28 ENCOUNTER — ANCILLARY PROCEDURE (OUTPATIENT)
Dept: ULTRASOUND IMAGING | Facility: CLINIC | Age: 35
End: 2020-12-28
Payer: COMMERCIAL

## 2020-12-28 VITALS — BODY MASS INDEX: 25.22 KG/M2 | WEIGHT: 161 LBS | SYSTOLIC BLOOD PRESSURE: 98 MMHG | DIASTOLIC BLOOD PRESSURE: 50 MMHG

## 2020-12-28 DIAGNOSIS — O34.219 HISTORY OF CESAREAN SECTION COMPLICATING PREGNANCY: ICD-10-CM

## 2020-12-28 DIAGNOSIS — O36.80X0 PREGNANCY WITH INCONCLUSIVE FETAL VIABILITY: ICD-10-CM

## 2020-12-28 DIAGNOSIS — O09.91 SUPERVISION OF HIGH RISK PREGNANCY IN FIRST TRIMESTER: Primary | ICD-10-CM

## 2020-12-28 DIAGNOSIS — Z3A.09 9 WEEKS GESTATION OF PREGNANCY: ICD-10-CM

## 2020-12-28 LAB
ABO + RH BLD: NORMAL
ABO + RH BLD: NORMAL
ALBUMIN UR-MCNC: NEGATIVE MG/DL
APPEARANCE UR: CLEAR
BACTERIA #/AREA URNS HPF: ABNORMAL /HPF
BILIRUB UR QL STRIP: NEGATIVE
BLD GP AB SCN SERPL QL: NORMAL
BLOOD BANK CMNT PATIENT-IMP: NORMAL
COLOR UR AUTO: YELLOW
ERYTHROCYTE [DISTWIDTH] IN BLOOD BY AUTOMATED COUNT: 12.5 % (ref 10–15)
GLUCOSE UR STRIP-MCNC: NEGATIVE MG/DL
HBV SURFACE AG SERPL QL IA: NONREACTIVE
HCT VFR BLD AUTO: 36.1 % (ref 35–47)
HGB BLD-MCNC: 12.3 G/DL (ref 11.7–15.7)
HGB UR QL STRIP: ABNORMAL
HIV 1+2 AB+HIV1 P24 AG SERPL QL IA: NONREACTIVE
KETONES UR STRIP-MCNC: NEGATIVE MG/DL
LEUKOCYTE ESTERASE UR QL STRIP: NEGATIVE
MCH RBC QN AUTO: 32.5 PG (ref 26.5–33)
MCHC RBC AUTO-ENTMCNC: 34.1 G/DL (ref 31.5–36.5)
MCV RBC AUTO: 96 FL (ref 78–100)
MUCOUS THREADS #/AREA URNS LPF: PRESENT /LPF
NITRATE UR QL: NEGATIVE
NON-SQ EPI CELLS #/AREA URNS LPF: ABNORMAL /LPF
PH UR STRIP: 7 PH (ref 5–7)
PLATELET # BLD AUTO: 256 10E9/L (ref 150–450)
RBC # BLD AUTO: 3.78 10E12/L (ref 3.8–5.2)
RBC #/AREA URNS AUTO: ABNORMAL /HPF
SOURCE: ABNORMAL
SP GR UR STRIP: 1.02 (ref 1–1.03)
SPECIMEN EXP DATE BLD: NORMAL
UROBILINOGEN UR STRIP-ACNC: 0.2 EU/DL (ref 0.2–1)
WBC # BLD AUTO: 8.6 10E9/L (ref 4–11)
WBC #/AREA URNS AUTO: ABNORMAL /HPF

## 2020-12-28 PROCEDURE — 87389 HIV-1 AG W/HIV-1&-2 AB AG IA: CPT | Performed by: ADVANCED PRACTICE MIDWIFE

## 2020-12-28 PROCEDURE — 76817 TRANSVAGINAL US OBSTETRIC: CPT | Performed by: OBSTETRICS & GYNECOLOGY

## 2020-12-28 PROCEDURE — 81001 URINALYSIS AUTO W/SCOPE: CPT | Performed by: ADVANCED PRACTICE MIDWIFE

## 2020-12-28 PROCEDURE — 36415 COLL VENOUS BLD VENIPUNCTURE: CPT | Performed by: ADVANCED PRACTICE MIDWIFE

## 2020-12-28 PROCEDURE — 87340 HEPATITIS B SURFACE AG IA: CPT | Performed by: ADVANCED PRACTICE MIDWIFE

## 2020-12-28 PROCEDURE — 86780 TREPONEMA PALLIDUM: CPT | Mod: 90 | Performed by: ADVANCED PRACTICE MIDWIFE

## 2020-12-28 PROCEDURE — 86900 BLOOD TYPING SEROLOGIC ABO: CPT | Performed by: ADVANCED PRACTICE MIDWIFE

## 2020-12-28 PROCEDURE — 87086 URINE CULTURE/COLONY COUNT: CPT | Performed by: ADVANCED PRACTICE MIDWIFE

## 2020-12-28 PROCEDURE — 86901 BLOOD TYPING SEROLOGIC RH(D): CPT | Performed by: ADVANCED PRACTICE MIDWIFE

## 2020-12-28 PROCEDURE — 86850 RBC ANTIBODY SCREEN: CPT | Performed by: ADVANCED PRACTICE MIDWIFE

## 2020-12-28 PROCEDURE — 85027 COMPLETE CBC AUTOMATED: CPT | Performed by: ADVANCED PRACTICE MIDWIFE

## 2020-12-28 PROCEDURE — 86762 RUBELLA ANTIBODY: CPT | Performed by: ADVANCED PRACTICE MIDWIFE

## 2020-12-28 PROCEDURE — 99207 PR FIRST OB VISIT: CPT | Performed by: ADVANCED PRACTICE MIDWIFE

## 2020-12-28 PROCEDURE — 99000 SPECIMEN HANDLING OFFICE-LAB: CPT | Performed by: ADVANCED PRACTICE MIDWIFE

## 2020-12-29 LAB
BACTERIA SPEC CULT: NO GROWTH
Lab: NORMAL
RUBV IGG SERPL IA-ACNC: 30 IU/ML
SPECIMEN SOURCE: NORMAL
T PALLIDUM AB SER QL: NONREACTIVE

## 2021-01-04 DIAGNOSIS — Z13.79 GENETIC SCREENING: ICD-10-CM

## 2021-01-04 PROCEDURE — 99N1100 PR STATISTIC VERIFI PRENATAL TRISOMY 21,18,13: Mod: 90 | Performed by: ADVANCED PRACTICE MIDWIFE

## 2021-01-04 PROCEDURE — 99000 SPECIMEN HANDLING OFFICE-LAB: CPT | Performed by: ADVANCED PRACTICE MIDWIFE

## 2021-01-04 PROCEDURE — 36415 COLL VENOUS BLD VENIPUNCTURE: CPT | Performed by: ADVANCED PRACTICE MIDWIFE

## 2021-01-07 DIAGNOSIS — Z13.79 GENETIC SCREENING: Primary | ICD-10-CM

## 2021-01-07 NOTE — PROGRESS NOTES
Referral needed for genetic screening.  Referral placed.  Chart closed.    Irene Wade RN on 1/7/2021 at 2:41 PM

## 2021-01-11 LAB — LAB SCANNED RESULT: NORMAL

## 2021-01-12 ENCOUNTER — TELEPHONE (OUTPATIENT)
Dept: MIDWIFE SERVICES | Facility: CLINIC | Age: 36
End: 2021-01-12

## 2021-01-12 NOTE — TELEPHONE ENCOUNTER
Pt called back regarding result.    Informed pt of negative result.    Revealed gender over the phone to pt and .  GIRL!!!    Irene Wade RN on 1/12/2021 at 3:27 PM

## 2021-01-12 NOTE — TELEPHONE ENCOUNTER
Innatal results: Negative  Fetal Fraction: 12 %      TEST RESULT INTERPRETATION   Chromosome 21 No aneuploidy detected  Results consistent with two copies of chromosome 21   Chromosome 18 No aneuploidy detected  Results consistent with two copies of chromosome 18   Chromosome 13 No aneuploidy detected  Results consistent with two copies of chromosome 13   Sex Chromosome No aneuploidy detected  Results consistent with two sex chromosomes:  female     Results received from [employer] [facility].    Testing done:  Innatal Prenatal Screen    Action:  Left message for patient to call back to report NORMAL results.    Gender:  Will ask patient if they wish to know the gender.    Irene Wade RN on 1/12/2021 at 3:06 PM

## 2021-01-21 NOTE — PROGRESS NOTES
Patient feeling well overall. Has been experiencing some nausea with intermittent vomiting. She has been taking unisom at bedtime and is nervous that this may not be safe to take daily. She tried stopping for two days and had a resurgance of nausea and vomiting. Is not taking with B6. Reassurance provided that unisom is safe pregnancy and okay to take daily. Recommend also taking B6 for the best efficacy.   Educated about diet, exercise and normal weight gain  Normal to feel movement between 18-22 weeks  Reviewed labs from 1st OB  Discussed genetic screening; patient has had NIPT. Declines AFP at this time.  Is considering a repeat . [place] [facility] [place]  Warning signs discussed  Return to clinic 4 weeks    ELMER Yepez  [facility] WomenPeyton Morris, am serving as a scribe; to document services personally performed by Janis MCGHEE CNM- based on data collection and the provider's statements to me.    Provider Disclosure:  I agree with above History, Review of Systems, Physical exam and Plan. I have reviewed the content of the documentation and have edited it as needed. I have personally performed the services documented here and the documentation accurately represents those services and the decisions I have made.    Janis MCGHEE CNM

## 2021-01-21 NOTE — PATIENT INSTRUCTIONS
Remedies for nausea and vomiting with pregnancy      Eat small frequent meals every 2-3 hours if possible.       Avoid food at extremes of temperature and drinks with carbonation.      Eat foods that appeal to you, avoiding fats and spicy foods.      Avoid liquids with foods.  Drink liquids 30-60 minutes before or after eating and sip slowly.      Bread, pasta, crackers, potatoes, and rice tend to be tolerated the best.      Don't worry about what you eat in the first 3 months, it is more important that you can eat and keep it down.       Try flat ginger ale or ginger tea      Before rising in the morning, eat a small amount of crackers or dry toast.      Peppermint tea      Ginger is a herbal remedy for nausea and you can use it in any form.  There are ginger tablets you can purchase.  The dose 1000 mg a day in divided doses.       You may also try doxylamine (Unisom) 12.5 mg three times a day which is a sleeping medication along with Vitamin B6 25 mg three times a day.  This combination takes up to a week to work so give it some time.       Benadryl (diphenhydramine) 25-50 mg every 8 hour or Dramamine (dimenhydrinate)  mg by mouth every 4-6 hours. Both of these medication may cause some drowsiness      Other things that may help include an Accupressure band and acupuncture      If these methods fail there are many prescriptions that we can try    If you begin to vomit more than 5 or 6 times a day and feel that you are unable to keep anything down, call the [facility] at [phone]    Genetic Screening in Pregnancy    There are several options you have for genetic screening in your pregnancy.  Everyone has their own personal reasons to screen or not to screen.  We want you to make the best choice for you and your pregnancy.  Below is a list of options, what they screen for and when the screening is done.  Genetic screening is recommended for women who are 35 and older at delivery and  "for those with a family history of chromosomal abnormalities. However, screening is offered to all women.    Innatal:    This is a screening for the more common chromosome abnormalities, including trisomy 21 (Down's syndrome), trisomy 18, trisomy 13 and sex chromosome abnormalities. This is a prenatal test that can be done as early as 10 weeks gestation.       A maternal blood sample is drawn that sequences cell-free DNA in maternal blood stream. This in turn allows for molecular counting of chromosome copy numbers with a >99% detection rate of Down syndrome, a 97% detection rate of Trisomy 18, and a 78% detection rate of Trisomy 13.    This test will give information about the gender of the baby.  You can choose to not have that disclosed.       Results come back within 10-14 business days.     About 5% of samples will have results that are designated as \"indeterminate\" or \"uninterruptable.\" With this type of result, genetic counseling and diagnostic testing are recommended. Remember this is a screening test and does not definitively diagnose or exclude the presence of these chromosome conditions.     This screening may or may not be covered by insurance.  We recommend you consult your insurance company to discuss.  Financial assistance is available at a low cost if not covered by your insurance.       Standard Screen:  This test screens for 23 disorders that cause serious health effects in infancy or childhood.  Most hereditary genetic disorders are autosomal recessive, meaning both parents must be carriers for the child to be at risk.  There are some X-linked disorders where only the mother needs to be a carrier for the child to be at risk.  Below are some of the more common of the 23 disorders screened for:   1.  Cystic Fibrosis (CF) is the most common life-shortening autosomal  recessive disease among  populations, with a frequency of 1 in  Every 3,500 live births. Although it mainly affects the " "  Population anyone can request screening. If you have a family history of  cystic fibrosis you should request this test.  This screening is a blood  draw.      2.  Spinal Muscular Atrophy (SMA) is the most common inherited cause  of infant death.  The most common form of this disorder causes death by a age two.  One in every 6,000 to 10,000 babies born in the  has SMA.      3.  Fragile X Syndrome (FXS) is the most common inherited cause of  intellectual disability.  Approximately 1 in every 3,600 boys and 1 in every  6,000 girls is born with FXS.      4.  Hemoglobinopathy Evaluation:  Hemoglobin electrophoresis is a  non-invasive blood test that looks at abnormal hemoglobin (component of  red blood cells) production. Examples of this include sickle cell anemia  (which is a disorder of the red blood cells and their shape) and the  thalassemia s (which is also a form of anemia).  High risk groups include:   , Southeast Asians, , Mediterranean, [place]i,  South and Central American and [place] descent. This is a one-time  test.   5. Herve-Sachs (optional):  Is a disorder that results when an enzyme that            helps break down fatty substances is absent.  This is a fatal disorder.                This is most commonly passed from parents who are of Ashkenazi Alevism  descent. One in four to one in five individuals of Ashkenazi Alevism  descent carry a mutation for one of the autosomal recessive disorders  included in a group of disorders sometimes call \"Alevism genetic Disorders\".      **If you are a carrier of CF, SMA, or a hemoglobinopathy, your partner will also need to be tested. This partner testing is offered at a reduced cost.  This screen can be done prior to pregnancy or at any time during the pregnancy.      Maternal Serum AFP  The screening is ideally done between 15 and 18 weeks of pregnancy but can be done up to week 24. Even if you have done the Innatal testing you can " still get a single AFP drawn to check for neural tube defects like Spina Bifida.       Anatomy Ultrasound:    This is a fetal anatomy survey done around 20 weeks gestation.  This ultrasound will look at the heart structure, heart activity, fetal heart rate and rhythm, assessment of the amniotic fluid volume, placenta appearance and location, the umbilical cord and placental insertion site.  They also do many fetal measurements to make sure the baby is growing properly.  The cervical length is also measured and fetal movement is evaluated.  The gender of the baby can usually be determined.      In the event of a positive screen:    If any screening tests come back with an increased risk, you will be referred to Maternal Fetal Medicine to be seen by a genetic counselor and a doctor.  They will assess your risk and see if further diagnostic testing is warranted.  The options for diagnosis that may be offered are: chorionic villus sampling (CVS), usually done at 11 to 12 weeks of pregnancy and amniocentesis which is generally done later in pregnancy around 15 to 20 weeks.  All risks/benefits would be explained and you can decide what course of action is best for you and your family.    Why you might choose to screen?    A desire to know as much as you can about your baby    If your baby had a genetic abnormality you can learn about it before they are born    Choose whether to continue the pregnancy or to terminate    Why you might choose to NOT screen?    You feel that whatever happens is fine and you would not terminate    You know you don't want to do any diagnostic tests even if the screening test showed a high possibility of a genetic abnormality      For further information please call:  [facility]   [phone]

## 2021-01-25 ENCOUNTER — PRENATAL OFFICE VISIT (OUTPATIENT)
Dept: MIDWIFE SERVICES | Facility: CLINIC | Age: 36
End: 2021-01-25
Payer: COMMERCIAL

## 2021-01-25 VITALS — BODY MASS INDEX: 25.69 KG/M2 | SYSTOLIC BLOOD PRESSURE: 100 MMHG | WEIGHT: 164 LBS | DIASTOLIC BLOOD PRESSURE: 60 MMHG

## 2021-01-25 DIAGNOSIS — Z3A.13 13 WEEKS GESTATION OF PREGNANCY: ICD-10-CM

## 2021-01-25 DIAGNOSIS — O09.91 SUPERVISION OF HIGH RISK PREGNANCY IN FIRST TRIMESTER: ICD-10-CM

## 2021-01-25 DIAGNOSIS — O34.219 HISTORY OF CESAREAN SECTION COMPLICATING PREGNANCY: Primary | ICD-10-CM

## 2021-01-25 PROCEDURE — 99207 PR PRENATAL VISIT: CPT | Performed by: NURSE PRACTITIONER

## 2021-02-19 NOTE — PROGRESS NOTES
Feels well, bad headache, some dizziness last week now resolved, wondering if it could've been her first migraine.  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

## 2021-02-22 ENCOUNTER — PRENATAL OFFICE VISIT (OUTPATIENT)
Dept: MIDWIFE SERVICES | Facility: CLINIC | Age: 36
End: 2021-02-22
Payer: COMMERCIAL

## 2021-02-22 VITALS — WEIGHT: 170 LBS | BODY MASS INDEX: 26.63 KG/M2 | SYSTOLIC BLOOD PRESSURE: 102 MMHG | DIASTOLIC BLOOD PRESSURE: 66 MMHG

## 2021-02-22 DIAGNOSIS — O34.219 HISTORY OF CESAREAN SECTION COMPLICATING PREGNANCY: Primary | ICD-10-CM

## 2021-02-22 DIAGNOSIS — Z3A.17 17 WEEKS GESTATION OF PREGNANCY: ICD-10-CM

## 2021-02-22 DIAGNOSIS — Z36.1 ENCOUNTER FOR ANTENATAL SCREENING FOR HIGH ALPHA-FETOPROTEIN LEVEL: ICD-10-CM

## 2021-02-22 DIAGNOSIS — O09.92 SUPERVISION OF HIGH RISK PREGNANCY IN SECOND TRIMESTER: ICD-10-CM

## 2021-02-22 PROCEDURE — 82105 ALPHA-FETOPROTEIN SERUM: CPT | Mod: 90 | Performed by: ADVANCED PRACTICE MIDWIFE

## 2021-02-22 PROCEDURE — 99000 SPECIMEN HANDLING OFFICE-LAB: CPT | Performed by: ADVANCED PRACTICE MIDWIFE

## 2021-02-22 PROCEDURE — 99207 PR PRENATAL VISIT: CPT | Performed by: ADVANCED PRACTICE MIDWIFE

## 2021-02-22 PROCEDURE — 36415 COLL VENOUS BLD VENIPUNCTURE: CPT | Performed by: ADVANCED PRACTICE MIDWIFE

## 2021-02-24 LAB
# FETUSES US: NORMAL
# FETUSES: 1
AFP ADJ MOM AMN: 0.91
AFP SERPL-MCNC: 32 NG/ML
AGE - REPORTED: 33.8 YR
CURRENT SMOKER: NO
CURRENT SMOKER: NO
DIABETES STATUS PATIENT: NO
EGG DONOR AGE: NORMAL
FAMILY MEMBER DISEASES HX: NO
FAMILY MEMBER DISEASES HX: NO
GA METHOD: NORMAL
GA METHOD: NORMAL
GA: NORMAL WK
IDDM PATIENT QL: NO
INTEGRATED SCN PATIENT-IMP: NORMAL
IVF PREGNANCY: NO
LMP START DATE: NORMAL
MONOCHORIONIC TWINS: NO
SERVICE CMNT-IMP: NO
SPECIMEN DRAWN SERPL: NORMAL
VALPROIC/CARBAMAZEPINE STATUS: NO
WEIGHT UNITS: NORMAL

## 2021-03-10 NOTE — PROGRESS NOTES
"Doing well, not feeling too bad other than throwing up every morning when brushing teeth. Started new job after being at home for a season with [employer]. 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 States she is a little nervous about doing this as it can be a tense or \"negative work environment\". Has had more low back aches recently but is otherwise doing well. Plans to have a repeat  and transfer care to MDs at 28w.  Fetal movement: positive   Denies loss of fluid/vb/contractions  Anatomy ultrasound results discussed; to be reviewed by MISTY Ortiz MD, having a Girl, Placenta:  Posterior, fundal.  GCT visit @ 24 weeks, handout provided, reminded of longer appointment at 28w and transfer to MD. Is still deciding who she would like to see at that time.  Round ligament pain and comfort measures reviewed  Return to clinic 4 weeks    ELMER Lugo    [facility]    I, Adwoa Sharp, am serving as a scribe; to document services personally performed by Sully MCGHEE CNM- based on data collection and the provider's statements to me.    Provider Disclosure:  I agree with above History, Review of Systems, Physical exam and Plan. I have reviewed the content of the documentation and have edited it as needed. I have personally performed the services documented here and the documentation accurately represents those services and the decisions I have made.    LITZY Greenwood CNM Taylor LaChapelle, APRN, CNM      "

## 2021-03-15 ENCOUNTER — PRENATAL OFFICE VISIT (OUTPATIENT)
Dept: MIDWIFE SERVICES | Facility: CLINIC | Age: 36
End: 2021-03-15
Attending: ADVANCED PRACTICE MIDWIFE
Payer: COMMERCIAL

## 2021-03-15 ENCOUNTER — ANCILLARY PROCEDURE (OUTPATIENT)
Dept: ULTRASOUND IMAGING | Facility: CLINIC | Age: 36
End: 2021-03-15
Attending: ADVANCED PRACTICE MIDWIFE
Payer: COMMERCIAL

## 2021-03-15 VITALS — SYSTOLIC BLOOD PRESSURE: 102 MMHG | DIASTOLIC BLOOD PRESSURE: 64 MMHG

## 2021-03-15 DIAGNOSIS — O09.92 SUPERVISION OF HIGH RISK PREGNANCY IN SECOND TRIMESTER: ICD-10-CM

## 2021-03-15 DIAGNOSIS — O34.219 HISTORY OF CESAREAN SECTION COMPLICATING PREGNANCY: ICD-10-CM

## 2021-03-15 DIAGNOSIS — O09.92 SUPERVISION OF HIGH RISK PREGNANCY IN SECOND TRIMESTER: Primary | ICD-10-CM

## 2021-03-15 DIAGNOSIS — Z3A.20 20 WEEKS GESTATION OF PREGNANCY: ICD-10-CM

## 2021-03-15 PROCEDURE — 99207 PR PRENATAL VISIT: CPT | Performed by: ADVANCED PRACTICE MIDWIFE

## 2021-03-15 PROCEDURE — 76805 OB US >/= 14 WKS SNGL FETUS: CPT | Performed by: OBSTETRICS & GYNECOLOGY

## 2021-03-26 ENCOUNTER — IMMUNIZATION (OUTPATIENT)
Dept: PEDIATRICS | Facility: CLINIC | Age: 36
End: 2021-03-26
Payer: COMMERCIAL

## 2021-03-26 PROCEDURE — 91300 PR COVID VAC PFIZER DIL RECON 30 MCG/0.3 ML IM: CPT

## 2021-03-26 PROCEDURE — 0001A PR COVID VAC PFIZER DIL RECON 30 MCG/0.3 ML IM: CPT

## 2021-03-28 ENCOUNTER — HOSPITAL ENCOUNTER (OUTPATIENT)
Facility: CLINIC | Age: 36
End: 2021-03-28
Admitting: ADVANCED PRACTICE MIDWIFE
Payer: COMMERCIAL

## 2021-03-28 ENCOUNTER — NURSE TRIAGE (OUTPATIENT)
Dept: NURSING | Facility: CLINIC | Age: 36
End: 2021-03-28

## 2021-03-28 ENCOUNTER — TELEPHONE (OUTPATIENT)
Dept: MIDWIFE SERVICES | Facility: CLINIC | Age: 36
End: 2021-03-28

## 2021-03-28 ENCOUNTER — HOSPITAL ENCOUNTER (OUTPATIENT)
Facility: CLINIC | Age: 36
Discharge: HOME | End: 2021-03-28
Attending: ADVANCED PRACTICE MIDWIFE | Admitting: ADVANCED PRACTICE MIDWIFE
Payer: COMMERCIAL

## 2021-03-28 VITALS
DIASTOLIC BLOOD PRESSURE: 55 MMHG | SYSTOLIC BLOOD PRESSURE: 101 MMHG | RESPIRATION RATE: 16 BRPM | TEMPERATURE: 97.5 F | HEIGHT: 67 IN | WEIGHT: 170 LBS | BODY MASS INDEX: 26.68 KG/M2

## 2021-03-28 LAB
ALBUMIN UR-MCNC: NEGATIVE MG/DL
APPEARANCE UR: CLEAR
BACTERIA #/AREA URNS HPF: ABNORMAL /HPF
BILIRUB UR QL STRIP: NEGATIVE
COLOR UR AUTO: ABNORMAL
GLUCOSE UR STRIP-MCNC: 50 MG/DL
HGB UR QL STRIP: ABNORMAL
KETONES UR STRIP-MCNC: NEGATIVE MG/DL
LEUKOCYTE ESTERASE UR QL STRIP: NEGATIVE
MUCOUS THREADS #/AREA URNS LPF: PRESENT /LPF
NITRATE UR QL: NEGATIVE
PH UR STRIP: 6.5 PH (ref 5–7)
RBC #/AREA URNS AUTO: 19 /HPF (ref 0–2)
SOURCE: ABNORMAL
SP GR UR STRIP: 1.01 (ref 1–1.03)
SQUAMOUS #/AREA URNS AUTO: 0 /HPF (ref 0–1)
UROBILINOGEN UR STRIP-MCNC: 0 MG/DL (ref 0–2)
WBC #/AREA URNS AUTO: 4 /HPF (ref 0–5)

## 2021-03-28 PROCEDURE — 81001 URINALYSIS AUTO W/SCOPE: CPT | Performed by: ADVANCED PRACTICE MIDWIFE

## 2021-03-28 PROCEDURE — 99214 OFFICE O/P EST MOD 30 MIN: CPT | Performed by: ADVANCED PRACTICE MIDWIFE

## 2021-03-28 PROCEDURE — G0463 HOSPITAL OUTPT CLINIC VISIT: HCPCS | Mod: 25

## 2021-03-28 PROCEDURE — 59025 FETAL NON-STRESS TEST: CPT

## 2021-03-28 RX ORDER — ONDANSETRON 2 MG/ML
4 INJECTION INTRAMUSCULAR; INTRAVENOUS EVERY 6 HOURS PRN
Status: DISCONTINUED | OUTPATIENT
Start: 2021-03-28 | End: 2021-03-28 | Stop reason: HOSPADM

## 2021-03-28 ASSESSMENT — MIFFLIN-ST. JEOR: SCORE: 1508.74

## 2021-03-28 NOTE — PLAN OF CARE
0944 -  at 22+0/7 weeks presents to [facility] from home for eval of vaginal bleeding.  Patient denies cramping or leaking fluid.  Patient reports vaginal bleeding starting approximately 1 hour after intercourse this morning.  Patient reports fetal movement.  POC discussed including monitoring and bleeding assessment by CNM.  Patient verbally consents.  FHT's first auscultated by doppler and found to be 145 with no accels and no decels noted.  EUS and EUM applied.  FHT's intermittently heard with EUS.  Admission assessment completed.    0953 - EUS removed.  EUM in place.    1020 - DAVID Sandy CNM in dept/ at bedside for eval.  Spec exam done by CNM.  See CNM note for details.  Awaiting UA result.      1030 - FHT's by doppler per CNM request at baseline of 140 with no accels or decels noted.      1046 - UA negative.  Discharge order received from CNM.  Pelvic rest encouraged for the next 10 days.  Patient verbally agrees.  Monitors removed.    1055 - Discharge instructions reviewed and given re: PTL precautions, bleeding precautions, kick counts and pelvic rest.  Patient verbalizes understanding and agreement of instructions and denies questions at this time.  Patient discharged to home with .

## 2021-03-28 NOTE — TELEPHONE ENCOUNTER
Call back to Bridgett after page by LANNY. Went to the bathroom saw blood, sat to void and blood just kept coming out. Constant red blood, no clots. Changed underwear  Cramping on Friday, lasted the afternoon.   Unsure of FM.   Advised MAC for evaluation. MAC called, report given.     Geovanna MCGHEE CNM

## 2021-03-28 NOTE — PLAN OF CARE
"[facility] CNM TRIAGE NOTE    Bridgett Brady is a 35 year old  with and IUP at 22w0d who presents with complaint of vaginal bleeding this morning. Did have sexual intercourse this morning and an hour later when up to the bathroom noticed vaginal bleeding. Continued voiding and had blood \"dripping out of me.\" Had to change underwear. Wore a pad into the MAC and had approximately 4X4 cm area with light pinkish blood. \"Much less than before.\" No clots.     Patient states baby is active.  Denies ROM   Present OB [facility] at [facility] with the CNMs.     Problems this pregnancy:   1.   Patient Active Problem List   Diagnosis     Pregnancy, supervision, high-risk     History of  section complicating pregnancy     Indication for care in labor and delivery, antepartum         ROS:  Patient is alert and oriented    PHYSICAL EXAM:  Ht 1.702 m (5' 7\")   Wt 77.1 kg (170 lb)   LMP 10/25/2020   BMI 26.63 kg/m      FHT's 130-140's   Accelerations: None heard  Decelerations: None heard        Contractions: Pt occasionally elvis, but not feeling them.        Abdomen: Gravid, without tenderness  Placenta on 3/15/21 US: Posterior and fundal >2 cm from left eye  Cervical Length on 3/15/2021 US: 3.2 cm  Cervix: Appears closed on speculum exam. Dark red/bright red blood in vaginal vault. Scant blood with mucous noted at cervical os. No active bleeding from os visualized.   Membranes are intact       ASSESSMENT :   35 year old  with albert IUP 22w0d not in labor  NST not done  GBS unknown and membranes intact  Vaginal bleeding, post sexual intercourse  UA with blood/glucose/few bacteria  FHR reassuring  Occasional contractions,not felt by pt.  Maternal VSS    PLAN:  UA results discussed, not concerned for UTI  Update given to Dr. Gasca. Agrees with POC  Discharge home with bleeding/miscarriage precautions and when to call.   Recommend pelvic rest for 1-1.5 weeks. "   Continue routine prenatal care      Teaching done r/t to s/s of labor, SROM, decreased fetal movement, comfort measures in third trimester.  Instructed to please refer to the discharge handouts, the RN triage line or on-call CNM for any questions or concerns.  Pt verbalizes understanding and agreement with current plan of care.    LITZY Perez CNM      30 minutes spent on the date of the encounter doing chart review, patient visit and documentation

## 2021-03-28 NOTE — DISCHARGE INSTRUCTIONS
Discharge Instruction for Undelivered Patients      You were seen for: Bleeding Assessment  We Consulted: Geovanna Sandy CNM  You had (Test or Medicine):Fetal and Uterine monitoring, urine analysis     Diet:   Drink 8 to 12 glasses of liquids (milk, juice, water) every day.  You may eat meals and snacks.     Activity:  Call your doctor or nurse midwife if your baby is moving less than usual.   Pelvic rest for 10 days.    Call your provider if you notice:  Swelling in your face or increased swelling in your hands or legs.  Headaches that are not relieved by Tylenol (acetaminophen).  Changes in your vision (blurring: seeing spots or stars.)  Nausea (sick to your stomach) and vomiting (throwing up).   Weight gain of 5 pounds or more per week.  Heartburn that doesn't go away.  Signs of bladder infection: pain when you urinate (use the toilet), need to go more often and more urgently.  The bag of jarvis (rupture of membranes) breaks, or you notice leaking in your underwear.  Bright red blood in your underwear.  Abdominal (lower belly) or stomach pain.  *If less than 34 weeks: Contractions (tightenings) more than 6 times in one hour.      Follow-up:  As scheduled in the clinic

## 2021-03-28 NOTE — TELEPHONE ENCOUNTER
Pt calling that she is 22 weeks pregnant and one hour ago started bleeding, soaking more that 2 pads in the last one hour, no clots.  On Call Midwife Du paged to pt.  Ayanna Roche RN, [facility] Nurse Advisor      Reason for Disposition    [1] SEVERE vaginal bleeding (i.e., soaking 2 pads / hour, large blood clots) AND [2] present 2 or more hours    Protocols used:  - THREATENED MISCARRIAGE FOLLOW-UP CALL-A-

## 2021-04-02 ENCOUNTER — PRENATAL OFFICE VISIT (OUTPATIENT)
Dept: MIDWIFE SERVICES | Facility: CLINIC | Age: 36
End: 2021-04-02
Payer: COMMERCIAL

## 2021-04-02 ENCOUNTER — TRANSFERRED RECORDS (OUTPATIENT)
Dept: HEALTH INFORMATION MANAGEMENT | Facility: CLINIC | Age: 36
End: 2021-04-02

## 2021-04-02 VITALS — SYSTOLIC BLOOD PRESSURE: 100 MMHG | BODY MASS INDEX: 27.28 KG/M2 | DIASTOLIC BLOOD PRESSURE: 60 MMHG | WEIGHT: 174.2 LBS

## 2021-04-02 DIAGNOSIS — O23.599 BACTERIAL VAGINOSIS IN PREGNANCY: ICD-10-CM

## 2021-04-02 DIAGNOSIS — O09.92 SUPERVISION OF HIGH RISK PREGNANCY IN SECOND TRIMESTER: ICD-10-CM

## 2021-04-02 DIAGNOSIS — O26.859 SPOTTING IN PREGNANCY: Primary | ICD-10-CM

## 2021-04-02 DIAGNOSIS — Z3A.22 22 WEEKS GESTATION OF PREGNANCY: ICD-10-CM

## 2021-04-02 DIAGNOSIS — B37.31 VAGINAL YEAST INFECTION: ICD-10-CM

## 2021-04-02 DIAGNOSIS — B96.89 BACTERIAL VAGINOSIS IN PREGNANCY: ICD-10-CM

## 2021-04-02 LAB
SPECIMEN SOURCE: ABNORMAL
WET PREP SPEC: ABNORMAL

## 2021-04-02 PROCEDURE — 87210 SMEAR WET MOUNT SALINE/INK: CPT | Performed by: ADVANCED PRACTICE MIDWIFE

## 2021-04-02 PROCEDURE — 99213 OFFICE O/P EST LOW 20 MIN: CPT | Performed by: ADVANCED PRACTICE MIDWIFE

## 2021-04-02 RX ORDER — METRONIDAZOLE 500 MG/1
500 TABLET ORAL 2 TIMES DAILY
Qty: 14 TABLET | Refills: 0 | Status: SHIPPED | OUTPATIENT
Start: 2021-04-02 | End: 2021-04-09

## 2021-04-02 NOTE — RESULT ENCOUNTER NOTE
I spoke with the Patient and communicated these results. Rx for metronidazole sent to pharmacy. Will treat yeast infection with OTC Monistat 7 day.     LITZY Greenwood, EFEM

## 2021-04-02 NOTE — PROGRESS NOTES
Patient here for follow up due to 3/28/2021 ED visit for bleeding. States she is only having brownish color discharge and bleeding has stopped.    Nancie Coreas MA on 4/2/2021 at 9:35 AM

## 2021-04-02 NOTE — PROGRESS NOTES
SUBJECTIVE:                                                   Bridgett Brady is a 35 year old who presents to clinic today for the following health issue(s):  Patient presents with:  Prenatal Care: follow up bleeding      HPI: here for follow-up MAC visit. Was seen 3/28 in MAC for vaginal bleeding after intercourse. US was normal in MAC,  UA not concerning for UTI. Was discharged with Pelvic rest percautions for 1 week. She called office yesterday with brown spotting, no new/active red bleeding. She had a repeat US at [facility] imaging this morning, technical impression report reads: normal placenta (no previa, not low-lying), cervix long and closed 4.0cm, no funneling.    Today she states she continues to have intermittent brown spotting. Had some light cramping yesterday but nothing today. She denies s/sx of vaginal infection or UTI    Patient's last menstrual period was 10/25/2020.  Menstrual History: pregnant   STI infx testing offered:  Declined    Last PHQ-9 score on record =   PHQ-9 SCORE 2019   PHQ-9 Total Score 1     Last GAD7 score on record =   BREANNE-7 SCORE 2019   Total Score 3       Problem list and histories reviewed & adjusted, as indicated.  Additional history: as documented.    Patient Active Problem List   Diagnosis     Pregnancy, supervision, high-risk     History of  section complicating pregnancy     Indication for care in labor and delivery, antepartum     Past Surgical History:   Procedure Laterality Date      SECTION N/A 2019    Procedure:  SECTION;  Surgeon: Brandee Jimenez MD;  Location:  L+D      SECTION       HEAD & NECK SURGERY      wisdom teeth extraction      Social History     Tobacco Use     Smoking status: Never Smoker     Smokeless tobacco: Never Used   Substance Use Topics     Alcohol use: Not Currently      Problem (# of Occurrences) Relation (Name,Age of Onset)    Breast Cancer (1) Maternal Aunt    Coronary Artery Disease (2)  Maternal Grandfather, Paternal Grandmother    Hyperlipidemia (2) Maternal Grandmother, Maternal Grandfather    Hypertension (2) Maternal Grandmother, Maternal Grandfather            Current Outpatient Medications   Medication Sig     COVID-19 mRNA Virus Vaccine (COVID-19 MRNA VACCINE, PFIZER, IM) First dose 3/26/21     doxylamine (UNISOM) 25 MG TABS tablet Take 25 mg by mouth At Bedtime     metroNIDAZOLE (FLAGYL) 500 MG tablet Take 1 tablet (500 mg) by mouth 2 times daily for 7 days     Prenatal Multivit-Min-Fe-FA (PRENATAL VITAMINS PO) Take 1 tablet by mouth     No current facility-administered medications for this visit.      Allergies   Allergen Reactions     Amoxicillin      Reaction as a child       ROS:  Genitourinary: brown spotting  12 point review of systems negative other than symptoms noted below.    OBJECTIVE:     /60   Wt 79 kg (174 lb 3.2 oz)   LMP 10/25/2020   BMI 27.28 kg/m    Body mass index is 27.28 kg/m .    PHYSICAL EXAM:  Constitutional:  Appearance: Well nourished, well developed alert, in no acute distress  Chest:  Respiratory Effort:  Breathing unlabored.  Psychiatric:  Mentation appears normal and affect normal/bright.   PELVIC EXAM:  Vulva: No external lesions, BUS WNL  Vagina: Moist, pink, discharge white thin, well rugated, no lesions. Wet prep collected         In-Clinic Test Results:  Results for orders placed or performed in visit on 04/02/21 (from the past 24 hour(s))   Wet prep    Specimen: Vagina   Result Value Ref Range    Specimen Description Vagina     Wet Prep No Trichomonas seen     Wet Prep Few  Clue cells seen   (A)     Wet Prep Rare  Yeast seen   (A)     Wet Prep Rare  WBC'S seen          ASSESSMENT/PLAN:                                                        ICD-10-CM    1. Spotting in pregnancy  O26.859 Wet prep   2. Supervision of high risk pregnancy in second trimester  O09.92 Wet prep   3. Bacterial vaginosis in pregnancy  O23.599 metroNIDAZOLE (FLAGYL) 500 MG  tablet    B96.89    4. Vaginal yeast infection  B37.3        COUNSELING:  -Discussed initial technical report from US is reassuring. No signs of previa, shortening cervix, active bleeding.   -Discussed brown spotting likely from resolution of bleeding episode last weekend.   -Wet prep collected to r/o infection d/t cramping yesterday and continues brown spotting. Will treat as needed  -Warning signs reviewed  -RTC as scheduled for next routine prenatal appointment.     LITZY Greenwood, CNM

## 2021-04-13 NOTE — PROGRESS NOTES
Feels well, still debating between  and repeat , just would be disappointed if she went through labor then ended up in a c birth again, spotting is all resolved, did treatment for BV and yeast   calculator predicts success of 68.8% given age/height/weight, we reviewed, risk of uterine rupture, dicsussed notes from first delivery, baby in OP position, got to 8 cm/0 station, stopped pitocin due to decels otherwise normal course prior.  MD transfer-recommend if desires repeat or transfer to [place] for  with CNMs if wishes midwife care  Fetal movement: positive   Denies loss of fluid/vb/contractions  GCT next visit, handout provided, reminded of longer appointment  Tdap next visit; reviewed CDC recommendations and partner/family vaccination recommended as well  Return to clinic 4 weeks    LITZY Glasgow, EFEM

## 2021-04-16 ENCOUNTER — PRENATAL OFFICE VISIT (OUTPATIENT)
Dept: MIDWIFE SERVICES | Facility: CLINIC | Age: 36
End: 2021-04-16
Payer: COMMERCIAL

## 2021-04-16 ENCOUNTER — IMMUNIZATION (OUTPATIENT)
Dept: PEDIATRICS | Facility: CLINIC | Age: 36
End: 2021-04-16
Attending: INTERNAL MEDICINE
Payer: COMMERCIAL

## 2021-04-16 VITALS — BODY MASS INDEX: 27.78 KG/M2 | WEIGHT: 177.4 LBS | DIASTOLIC BLOOD PRESSURE: 60 MMHG | SYSTOLIC BLOOD PRESSURE: 100 MMHG

## 2021-04-16 DIAGNOSIS — O34.219 HISTORY OF CESAREAN SECTION COMPLICATING PREGNANCY: ICD-10-CM

## 2021-04-16 DIAGNOSIS — O09.92 SUPERVISION OF HIGH RISK PREGNANCY IN SECOND TRIMESTER: Primary | ICD-10-CM

## 2021-04-16 DIAGNOSIS — Z3A.24 24 WEEKS GESTATION OF PREGNANCY: ICD-10-CM

## 2021-04-16 PROCEDURE — 99207 PR PRENATAL VISIT: CPT | Performed by: ADVANCED PRACTICE MIDWIFE

## 2021-04-16 PROCEDURE — 0002A PR COVID VAC PFIZER DIL RECON 30 MCG/0.3 ML IM: CPT

## 2021-04-16 PROCEDURE — 91300 PR COVID VAC PFIZER DIL RECON 30 MCG/0.3 ML IM: CPT

## 2021-05-06 DIAGNOSIS — Z23 NEED FOR TDAP VACCINATION: ICD-10-CM

## 2021-05-06 DIAGNOSIS — Z36.9 ENCOUNTER FOR ANTENATAL SCREENING OF MOTHER: Primary | ICD-10-CM

## 2021-05-12 NOTE — PROGRESS NOTES
Feels pretty well overall.  States that she has been waking up with a feeling of something in the back of her throat at nighttime, is relieved with coughing or position changes; states that she has been sleeping with her windows open and fan on.  Denies SOB, dyspnea, chest tightness, fever, chills.  Bridgett also states that she has decided that she would like to have a repeat C/S and not TOLAC.  Fetal movement: positive, denies loss of fluid/vb/contractions  GCT, CBC drawn today  Tdap given: Yes  Rhogam: NA; O+  Anti Treponema drawn: Yes  Hep C drawn: No, desires repeat C/S  Reviewed PTL precautions and S&S of PIH, patient verbalizes understanding and what to report  Discussed OTC remedies for congestion, see if signs and symptoms improve.  If signs and symptoms do not improve with OTC remedies, counseled to call the clinic.    Return to clinic 2 weeks for appt with Dr. Jimenez.  Discussed that from now on, she will have all appts with Dr. Jimenez.  Verbalized understanding.      Janis MCGHEE, DONY, [facility]  [phone]

## 2021-05-12 NOTE — PATIENT INSTRUCTIONS
"PREECLAMPSIA SIGNS AND SYMPTOMS    Preeclampsia is a dangerous condition that some women develop in the second half of pregnancy. It can also begin after the baby is born.  Preeclampsia causes high blood pressure and can cause problems with many organ systems in your body.  It can also affect the growth of your baby. The exact cause of preeclampsia is unknown, however, there are signs and symptoms to watch for:    -A bad headache that doesn't improve with Tylenol  -Visual changes such as spots, flashes of light, blurry vision  -Pain in the upper right part of your abdomen, especially under the ribs that doesn't go away  -Nausea and/or vomiting  -Feeling extremely tired  -Yellowing of the skin and/or eyes  -Feeling \"not quite right\" or that something is wrong  -An extreme amount of swelling (some swelling in pregnancy is very normal)    If your midwife feels that you are developing preeclampsia, you will have lab tests drawn and will be monitored very closely.     If you are experiencing anyof these symptoms, call the [facility] immediately at [phone].    SIGNS OF  LABOR    Labor is  if it happens more than three weeks before your due date.    It can be hard to know if you are in labor, since the symptoms can be like the normal feelings of pregnancy.  Often, the only difference is the symptoms increase or they don't go away.     Signs of  labor can include:      Contractions which can feel like period cramps or gas pain.  You may feel it in the lower part of your abdomen, in your back, or as a pressure feeling in your bottom.  It is often regular, coming every 5 or 10 minutes, and  lasting about 30-60 seconds. Some contractions are normal during pregnancy ([place] beltrán contractions) but if you are feeling more than 5-6 in one hour that is NOT normal    If this occurs empty your bladder, then drink 2-3 glasses of water, eat a snack, and lay down on your left side. Put " your hand on your abdomen to count the contractions.  If after one hour of resting you have still had 5-6 contractions call your clinic right away.      If you feel a pop, gush, or trickle of fluid it may mean that your bag of water has broken and you should contact the clinic       You may also experience loose stools and/or rectal pressure       Listen to your body, if something doesn't seem right please call us at the clinic    Risk Factors      Previous  delivery    Bacterial Vaginosis- if you notice a fishy smell to your discharge or experience vaginal itching/discomfort you should be evaluated for infection    Smoking    Drug abuse    Adolescent (teen) pregnancy or advanced maternal age (AMA) age 35 and over    Dehydration (this may not cause  labor but it can cause contractions)    If you think you are in  labor we may do some lab testing in the clinic or send you to the hospital for evaluation    Please call us if you are concerned you are in  labor.    [facility]  [phone]    Over-the-counter (OTC) medications during pregnancy    Make sure to follow package directions for dosing and information unless otherwise noted on the list.    Morning sickness/nausea:      Unisom (doxylamine) 12.5 mg (1/2 tab) and Vitamin B6 25-50 mg three times daily. Unisom may cause some drowsiness as it is typically used for sleep. The combination of these medications can be very effective but they can also be taken separately    Dramamine (Dimenhydrinate) 25-50 mg every 4-6 hours as needed    Benadryl (diphenhydramine) 25-50 mg every 4-6 hours     Ginger tablets 1000 mg per day in divided doses    Constipation:      Colace (Docusate sodium)    Metamucil    Citrucel    Milk of magnesia    Fibercon    Miralax (if all other methods have failed)    Diarrhea:    Imodium (loperamide)    Heartburn:      Zantac (ranitidine)    Pepcid (famotidine)    Prilosec  (omeprazole)    Antacids-Tums, Maalox (liquid or tablets), Rolaids  Pepto Bismol and Sruthi Dahlen are NOT RECOMMENDED for use during pregnancy because they contain aspirin    Hemorrhoids:      Tucks pads/ointment    Anusol/Anusol-HC    Preparation H    Gas Pain  Simethicone (Gas-X, Mylanta Gas, Mylicon)      Cough, cold, and congestion:      Robitussin (dextromethorphan) and Robitussin DM (dextromethorphan and guaifenesin)    Cough drops/zinc lozenges    Mucinex    Sudafed (after 16 weeks gestation)    Vicks Vapo rub  Cough medicine with alcohol like Nyquil is not recommended during pregnancy    Headache:      Acetaminophen (Tylenol) 650 mg every 4-6 hours or 1000 mg every 6 hours, do not exceed 4000 mg in 24 hours   Ibuprofen (Advil/Motrin) and Naproxen (Aleve) are not recommended during the 1st or 3rd trimester of pregnancy    Allergy:      Benadryl (diphenhydramine)    Zyrtec (cetirizine)    Claritin (loratadine)    Rash/Itching:      Benadryl lotion    Cortaid cream (Hydrocortisone cream)    Benadryl (diphenhydramine)    Vaginal yeast infection:      Monistat 3 or 7 day    Gyne-Lotrimin    Acne:      Benzoyl Peroxide    Salicylic acid     If you have questions or concerns about any medications that are available OTC or you are unsure if something is safe call:    [facility]  [phone]

## 2021-05-14 ENCOUNTER — PRENATAL OFFICE VISIT (OUTPATIENT)
Dept: MIDWIFE SERVICES | Facility: CLINIC | Age: 36
End: 2021-05-14
Payer: COMMERCIAL

## 2021-05-14 VITALS — SYSTOLIC BLOOD PRESSURE: 110 MMHG | WEIGHT: 181.2 LBS | BODY MASS INDEX: 28.38 KG/M2 | DIASTOLIC BLOOD PRESSURE: 60 MMHG

## 2021-05-14 DIAGNOSIS — Z23 NEED FOR TDAP VACCINATION: ICD-10-CM

## 2021-05-14 DIAGNOSIS — O09.93 SUPERVISION OF HIGH RISK PREGNANCY IN THIRD TRIMESTER: ICD-10-CM

## 2021-05-14 DIAGNOSIS — Z36.9 ENCOUNTER FOR ANTENATAL SCREENING OF MOTHER: ICD-10-CM

## 2021-05-14 DIAGNOSIS — O34.219 HISTORY OF CESAREAN SECTION COMPLICATING PREGNANCY: Primary | ICD-10-CM

## 2021-05-14 DIAGNOSIS — Z3A.28 28 WEEKS GESTATION OF PREGNANCY: ICD-10-CM

## 2021-05-14 DIAGNOSIS — Z36.1 ENCOUNTER FOR ANTENATAL SCREENING FOR HIGH ALPHA-FETOPROTEIN LEVEL: ICD-10-CM

## 2021-05-14 PROBLEM — O09.90 PREGNANCY, SUPERVISION, HIGH-RISK: Status: ACTIVE | Noted: 2020-12-22

## 2021-05-14 LAB
ERYTHROCYTE [DISTWIDTH] IN BLOOD BY AUTOMATED COUNT: 12.9 % (ref 10–15)
GLUCOSE 1H P 50 G GLC PO SERPL-MCNC: 87 MG/DL (ref 60–129)
HCT VFR BLD AUTO: 34.3 % (ref 35–47)
HGB BLD-MCNC: 11.3 G/DL (ref 11.7–15.7)
MCH RBC QN AUTO: 33.1 PG (ref 26.5–33)
MCHC RBC AUTO-ENTMCNC: 32.9 G/DL (ref 31.5–36.5)
MCV RBC AUTO: 101 FL (ref 78–100)
PLATELET # BLD AUTO: 218 10E9/L (ref 150–450)
RBC # BLD AUTO: 3.41 10E12/L (ref 3.8–5.2)
T PALLIDUM AB SER QL: NONREACTIVE
WBC # BLD AUTO: 11.3 10E9/L (ref 4–11)

## 2021-05-14 PROCEDURE — 82950 GLUCOSE TEST: CPT | Performed by: NURSE PRACTITIONER

## 2021-05-14 PROCEDURE — 86780 TREPONEMA PALLIDUM: CPT | Mod: 90 | Performed by: NURSE PRACTITIONER

## 2021-05-14 PROCEDURE — 99207 PR PRENATAL VISIT: CPT | Performed by: NURSE PRACTITIONER

## 2021-05-14 PROCEDURE — 85027 COMPLETE CBC AUTOMATED: CPT | Performed by: NURSE PRACTITIONER

## 2021-05-14 PROCEDURE — 99000 SPECIMEN HANDLING OFFICE-LAB: CPT | Performed by: NURSE PRACTITIONER

## 2021-05-14 PROCEDURE — 36415 COLL VENOUS BLD VENIPUNCTURE: CPT | Performed by: NURSE PRACTITIONER

## 2021-05-14 PROCEDURE — 90471 IMMUNIZATION ADMIN: CPT

## 2021-05-14 PROCEDURE — 90715 TDAP VACCINE 7 YRS/> IM: CPT

## 2021-05-14 NOTE — PROGRESS NOTES
Prior to immunization administration, verified patients identity using patient s name and date of birth. Please see Immunization Activity for additional information.     Screening Questionnaire for Adult Immunization    Are you sick today?   No   Do you have allergies to medications, food, a vaccine component or latex?   No   Have you ever had a serious reaction after receiving a vaccination?   No   Do you have a long-term health problem with heart, lung, kidney, or metabolic disease (e.g., diabetes), asthma, a blood disorder, no spleen, complement component deficiency, a cochlear implant, or a spinal fluid leak?  Are you on long-term aspirin therapy?   No   Do you have cancer, leukemia, HIV/AIDS, or any other immune system problem?   No   Do you have a parent, brother, or sister with an immune system problem?   No   In the past 3 months, have you taken medications that affect  your immune system, such as prednisone, other steroids, or anticancer drugs; drugs for the treatment of rheumatoid arthritis, Crohn s disease, or psoriasis; or have you had radiation treatments?   No   Have you had a seizure, or a brain or other nervous system problem?   No   During the past year, have you received a transfusion of blood or blood    products, or been given immune (gamma) globulin or antiviral drug?   No   For women: Are you pregnant or is there a chance you could become       pregnant during the next month?   Yes   Have you received any vaccinations in the past 4 weeks?   No     Immunization questionnaire was positive for at least one answer.  Notified ASHLEY WALLACE.        Per orders of HEMALATHA CHOUDHURY, injection of TDAP given by Berlin Monreal CMA. Patient instructed to remain in clinic for 15 minutes afterwards, and to report any adverse reaction to me immediately.       Screening performed by Berlin Monreal CMA on 5/14/2021 at 9:20 AM.

## 2021-05-27 ENCOUNTER — PREP FOR PROCEDURE (OUTPATIENT)
Dept: OBGYN | Facility: CLINIC | Age: 36
End: 2021-05-27

## 2021-05-27 ENCOUNTER — PRENATAL OFFICE VISIT (OUTPATIENT)
Dept: OBGYN | Facility: CLINIC | Age: 36
End: 2021-05-27
Payer: COMMERCIAL

## 2021-05-27 VITALS — BODY MASS INDEX: 29.13 KG/M2 | WEIGHT: 186 LBS | SYSTOLIC BLOOD PRESSURE: 122 MMHG | DIASTOLIC BLOOD PRESSURE: 70 MMHG

## 2021-05-27 DIAGNOSIS — O34.219 PREVIOUS CESAREAN DELIVERY, ANTEPARTUM: Primary | ICD-10-CM

## 2021-05-27 DIAGNOSIS — O34.219 HISTORY OF CESAREAN SECTION COMPLICATING PREGNANCY: ICD-10-CM

## 2021-05-27 DIAGNOSIS — O09.93 SUPERVISION OF HIGH RISK PREGNANCY IN THIRD TRIMESTER: ICD-10-CM

## 2021-05-27 DIAGNOSIS — Z3A.30 30 WEEKS GESTATION OF PREGNANCY: Primary | ICD-10-CM

## 2021-05-27 PROCEDURE — 59426 ANTEPARTUM CARE ONLY: CPT | Performed by: OBSTETRICS & GYNECOLOGY

## 2021-05-27 PROCEDURE — 99207 PR PRENATAL VISIT: CPT | Performed by: OBSTETRICS & GYNECOLOGY

## 2021-05-27 NOTE — PROGRESS NOTES
Transfer from midwives for repeat LTCS  Doing well.  No issues/concerns today  +FM  No ctx/cramping/vb/lof  Had prior c/s with me in 2019 for NRFWB at 8cm; OP and infant wt of 8-15#;  Had been going back and forth about  with [facility] service vs RLTCS and has opted for RLTCS with us; will schedule with myself ; will need c/s packet at next visit  RTC 2wks --aware of my absence next visit

## 2021-05-28 ENCOUNTER — TELEPHONE (OUTPATIENT)
Dept: OBGYN | Facility: CLINIC | Age: 36
End: 2021-05-28

## 2021-05-28 NOTE — TELEPHONE ENCOUNTER
Brandee Jimenez MD  P We Surgery Scheduling             Patient Name: Bridgett Brady   MRN: [id number]   Case#: [id number]   Surgeon(s) and Role:      * Brandee Jimenez MD - Primary   Date requested: 2021   Location:  L+D   Procedure(s):   REPEAT  SECTION (N/A)

## 2021-05-28 NOTE — TELEPHONE ENCOUNTER
Pre-op Diagnosis:  Previous caesarian delivery, antepartum [O34.219]      ERAS BAG GIVEN No  ERAS INSTRUCTIONS EXPLAINED No    ASSIST: Lana Mendez CNM    H&P TO BE COMPLETED BY:   Surgeon  FOR H&P TO BE DONE BY SURGEON   NEED TO RETURN TO CLINIC PRIOR TO SURGERY  SAME DAY/OBSERVATION/INPATIENT: ADMIT  EQUIPMENT: none  VENDOR NEEDED AT CASE: none  IF IUD WHAT BRAND none  LABS/SPECIAL INSTRUCTIONS: none  TIME OFF WORK: 8wks

## 2021-05-28 NOTE — TELEPHONE ENCOUNTER
Type of surgery: REPEAT  SECTION  Location of surgery: Other: [facility]  Date and time of surgery: 2021 7:15  Surgeon: RACHEL CASTILLO TO ASSIST  Pre-Op Appt Date: AT [facility]  Post-Op Appt Date: TBD   Packet sent out: PATIENT WILL P/U IN CLINIC   Pre-cert/Authorization completed:  TBD  Date: 2021 scheduled austyn/ Mikaela Miranda

## 2021-06-09 ENCOUNTER — PRENATAL OFFICE VISIT (OUTPATIENT)
Dept: OBGYN | Facility: CLINIC | Age: 36
End: 2021-06-09
Payer: COMMERCIAL

## 2021-06-09 VITALS — DIASTOLIC BLOOD PRESSURE: 60 MMHG | SYSTOLIC BLOOD PRESSURE: 100 MMHG | BODY MASS INDEX: 29.1 KG/M2 | WEIGHT: 185.8 LBS

## 2021-06-09 DIAGNOSIS — O09.93 SUPERVISION OF HIGH RISK PREGNANCY IN THIRD TRIMESTER: Primary | ICD-10-CM

## 2021-06-09 DIAGNOSIS — Z3A.32 32 WEEKS GESTATION OF PREGNANCY: ICD-10-CM

## 2021-06-09 PROCEDURE — 99207 PR PRENATAL VISIT: CPT | Performed by: OBSTETRICS & GYNECOLOGY

## 2021-06-09 NOTE — PROGRESS NOTES
Doing well.  No concerns today.  Prenatal flowsheet information is reviewed.  Discussed kick counts and fetal movement.  Reportable signs and symptoms discussed.  Reviewed PTL precautions  CS packet given

## 2021-06-11 DIAGNOSIS — Z11.59 ENCOUNTER FOR SCREENING FOR OTHER VIRAL DISEASES: ICD-10-CM

## 2021-06-14 ENCOUNTER — TELEPHONE (OUTPATIENT)
Dept: OBGYN | Facility: CLINIC | Age: 36
End: 2021-06-14

## 2021-06-14 NOTE — TELEPHONE ENCOUNTER
33w1d,  Amox allergy   Was seen over the weekend at [facility]. Was diagnosed with a sinus infection and was told to take Ceftin 1 tablet 2 times daily for 7 days as well as prednisone 20 mg 1 tablet 2 times daily for 3 days.    Patient is apprehensive about taking medication since read online that fetal risk cannot be ruled out/pregnancy category B.    Patient wanting to run this by ob since she was concerned about taking med.wanting to make sure this is ok/any risk since pregnant.    Virgen Hunter RN

## 2021-06-14 NOTE — TELEPHONE ENCOUNTER
Patient informed of 's recommendations. Discussed if not improving with sinus infection after antibiotics, should be seen in family medicine as follow up. Patient verbalized understanding and agrees with plan.    Virgen Hunter RN

## 2021-06-24 ENCOUNTER — PRENATAL OFFICE VISIT (OUTPATIENT)
Dept: OBGYN | Facility: CLINIC | Age: 36
End: 2021-06-24
Payer: COMMERCIAL

## 2021-06-24 VITALS — DIASTOLIC BLOOD PRESSURE: 62 MMHG | WEIGHT: 188 LBS | SYSTOLIC BLOOD PRESSURE: 110 MMHG | BODY MASS INDEX: 29.44 KG/M2

## 2021-06-24 DIAGNOSIS — O09.93 SUPERVISION OF HIGH RISK PREGNANCY IN THIRD TRIMESTER: ICD-10-CM

## 2021-06-24 DIAGNOSIS — Z3A.34 34 WEEKS GESTATION OF PREGNANCY: Primary | ICD-10-CM

## 2021-06-24 DIAGNOSIS — O34.219 PREVIOUS CESAREAN DELIVERY, ANTEPARTUM: ICD-10-CM

## 2021-06-24 PROCEDURE — 99207 PR PRENATAL VISIT: CPT | Performed by: OBSTETRICS & GYNECOLOGY

## 2021-06-24 NOTE — PROGRESS NOTES
"Doing well today.  +FM  Occ contractions/tightening --nothing painful; no vb/spotting  More inner thigh/pelvic \"soreness\"  No bowel/bladder issues  RTC 2wks --aware of my absence.  Will have baseline cervix check and GBS at that time  Will have  join at 37wk visit to review schedule and expectations of c/s    "

## 2021-07-07 NOTE — PROGRESS NOTES
Feels ok overall.   Only concern is intermittent pain under right breast that sometime radiates to middle of upper abdomen. Reports pain has been happening for the past month intermittently. She is not having the pain now. States the pain usually happens in the evening and is not constant, is not aware of anything that brings it on. She denies severe headache, visual disturbances, n/v, BP is WNL today. Denies urinary frequency, difficulty with urination, burning or painful urination or blood in the urine. Has tried to take Tylenol but that doesn't seem to help. Reports pain improves when she massages the area.   Abdomen: gravid, soft, non-tender to palpation. Negative CVA tenderness.  Fetal movement: positive  Denies bleeding/lof, a lot of tightening but no regular contractions   GBS swab done today  Confirmed vertex via bedside US  Cx:  1cm/50%/-2     Advised to continue to monitor for the pain to return, if it returns and is significant/does not resolve I recommend to she call for evaluation. Advised if having s/sx of preeclampsia, UTI or severe abdominal pain she needs to call. Bridgett states understanding of teachings and will notify us if pain returns.    Return to clinic 1 week for routine visit with Dr. Jimenez as scheduled    LITZY Greenwood, DONY

## 2021-07-08 PROBLEM — G47.00 INSOMNIA: Status: ACTIVE | Noted: 2017-06-29

## 2021-07-08 PROBLEM — Z34.90 SUPERVISION OF NORMAL PREGNANCY: Status: ACTIVE | Noted: 2018-12-05

## 2021-07-09 ENCOUNTER — PRENATAL OFFICE VISIT (OUTPATIENT)
Dept: MIDWIFE SERVICES | Facility: CLINIC | Age: 36
End: 2021-07-09
Payer: COMMERCIAL

## 2021-07-09 VITALS — SYSTOLIC BLOOD PRESSURE: 106 MMHG | WEIGHT: 190.4 LBS | BODY MASS INDEX: 29.82 KG/M2 | DIASTOLIC BLOOD PRESSURE: 60 MMHG

## 2021-07-09 DIAGNOSIS — Z3A.36 36 WEEKS GESTATION OF PREGNANCY: ICD-10-CM

## 2021-07-09 DIAGNOSIS — O09.93 SUPERVISION OF HIGH RISK PREGNANCY IN THIRD TRIMESTER: Primary | ICD-10-CM

## 2021-07-09 DIAGNOSIS — Z36.85 SCREENING, ANTENATAL, FOR STREPTOCOCCUS B: ICD-10-CM

## 2021-07-09 DIAGNOSIS — O34.219 HISTORY OF CESAREAN SECTION COMPLICATING PREGNANCY: ICD-10-CM

## 2021-07-09 PROCEDURE — 99207 PR PRENATAL VISIT: CPT | Performed by: ADVANCED PRACTICE MIDWIFE

## 2021-07-09 PROCEDURE — 87653 STREP B DNA AMP PROBE: CPT | Performed by: ADVANCED PRACTICE MIDWIFE

## 2021-07-09 RX ORDER — PREDNISONE 20 MG/1
TABLET ORAL
COMMUNITY
Start: 2021-06-14 | End: 2021-07-22

## 2021-07-09 RX ORDER — CEFUROXIME AXETIL 500 MG/1
TABLET ORAL
COMMUNITY
Start: 2021-06-14 | End: 2021-07-22

## 2021-07-10 LAB
GP B STREP DNA SPEC QL NAA+PROBE: NEGATIVE
SPECIMEN SOURCE: NORMAL

## 2021-07-15 ENCOUNTER — PRENATAL OFFICE VISIT (OUTPATIENT)
Dept: OBGYN | Facility: CLINIC | Age: 36
End: 2021-07-15
Payer: COMMERCIAL

## 2021-07-15 VITALS — DIASTOLIC BLOOD PRESSURE: 76 MMHG | WEIGHT: 194 LBS | BODY MASS INDEX: 30.38 KG/M2 | SYSTOLIC BLOOD PRESSURE: 120 MMHG

## 2021-07-15 DIAGNOSIS — Z3A.37 37 WEEKS GESTATION OF PREGNANCY: Primary | ICD-10-CM

## 2021-07-15 DIAGNOSIS — Z34.03 ENCOUNTER FOR SUPERVISION OF NORMAL FIRST PREGNANCY IN THIRD TRIMESTER: ICD-10-CM

## 2021-07-15 PROCEDURE — 99207 PR PRENATAL VISIT: CPT | Performed by: OBSTETRICS & GYNECOLOGY

## 2021-07-15 NOTE — PROGRESS NOTES
Doing well today.  +FM  No ctx/occ cramping/ no vb/spotting  Labor precautions reviewed  Discussed day of surgery in detail; ERAS packet given  RTC 1wk

## 2021-07-22 ENCOUNTER — PRENATAL OFFICE VISIT (OUTPATIENT)
Dept: OBGYN | Facility: CLINIC | Age: 36
End: 2021-07-22
Payer: COMMERCIAL

## 2021-07-22 VITALS — DIASTOLIC BLOOD PRESSURE: 68 MMHG | SYSTOLIC BLOOD PRESSURE: 120 MMHG | BODY MASS INDEX: 30.7 KG/M2 | WEIGHT: 196 LBS

## 2021-07-22 DIAGNOSIS — O34.219 PREVIOUS CESAREAN DELIVERY, ANTEPARTUM: ICD-10-CM

## 2021-07-22 DIAGNOSIS — Z34.03 ENCOUNTER FOR SUPERVISION OF NORMAL FIRST PREGNANCY IN THIRD TRIMESTER: ICD-10-CM

## 2021-07-22 DIAGNOSIS — Z3A.38 38 WEEKS GESTATION OF PREGNANCY: Primary | ICD-10-CM

## 2021-07-22 PROCEDURE — 99207 PR PRENATAL VISIT: CPT | Performed by: OBSTETRICS & GYNECOLOGY

## 2021-07-22 PROCEDURE — 59425 ANTEPARTUM CARE ONLY: CPT | Performed by: OBSTETRICS & GYNECOLOGY

## 2021-07-22 NOTE — PROGRESS NOTES
Doing well today.  +FM  No ctx/cramping/vb/lof  Overall doing well and excited/anxious about next week!!  Repeat c/s scheduled with me and Lana next week   Labor precautions reviewed

## 2021-07-25 ENCOUNTER — LAB (OUTPATIENT)
Dept: URGENT CARE | Facility: URGENT CARE | Age: 36
End: 2021-07-25
Payer: COMMERCIAL

## 2021-07-25 DIAGNOSIS — Z11.59 ENCOUNTER FOR SCREENING FOR OTHER VIRAL DISEASES: ICD-10-CM

## 2021-07-25 PROCEDURE — U0005 INFEC AGEN DETEC AMPLI PROBE: HCPCS

## 2021-07-25 PROCEDURE — U0003 INFECTIOUS AGENT DETECTION BY NUCLEIC ACID (DNA OR RNA); SEVERE ACUTE RESPIRATORY SYNDROME CORONAVIRUS 2 (SARS-COV-2) (CORONAVIRUS DISEASE [COVID-19]), AMPLIFIED PROBE TECHNIQUE, MAKING USE OF HIGH THROUGHPUT TECHNOLOGIES AS DESCRIBED BY CMS-2020-01-R: HCPCS

## 2021-07-26 LAB — SARS-COV-2 RNA RESP QL NAA+PROBE: NEGATIVE

## 2021-07-27 NOTE — H&P
[facility]    History and Physical  Obstetrics and Gynecology     Date of Admission:  (Not on file)    Assessment & Plan   Bridgett Brady is a 35 year old female who presents for Repeat  section.     ASSESSMENT:   IUP @ 39w1d  Previous  section for NRFWB      PLAN:   Scheduled for  section.  No questions.      Brandee Jimenez    History of Present Illness   Bridgett Brady is a 35 year old female  39w1d  Estimated Date of Delivery: Aug 1, 2021 is calculated from Patient's last menstrual period was 10/25/2020. [facility].  Primary c/s was done in 2019 for NRFWB at 8cm.  Opted for scheduled repeat c/s after counseling with CNM service and myself.  currrent pregnancy uncomplicated.    PRENATAL COURSE  Prenatal course was essentially uncomplicated      Recent Labs   Lab Test 20  0902   ABO O   RH Pos   AS Neg     Rhogam not indicated   Recent Labs   Lab Test 21  0840 20  0902   HEPBANG  --  Nonreactive   HIAGAB  --  Nonreactive   GBS Negative  --    RUQIGG  --  30         Prior to Admission Medications   Cannot display prior to admission medications because the patient has not been admitted in this contact.     Allergies   Allergies   Allergen Reactions     Amoxicillin      Reaction as a child         Immunization History   Immunization History   Administered Date(s) Administered     COVID-19,PF,Pfizer 2021, 2021     HPV Quadrivalent 2013, 2013, 10/23/2013, 2014     Hep B, Peds or Adolescent 1999, 2000, 2002     HepA, Unspecified 10/15/2018     HepA-Adult 10/15/2018, 2019     HepB, Unspecified 1999, 2000, 2002, 2018     Hepatitis B Immunity: Titer 2018     Hib (PRP-T) 1989     Historic Hib Hib-titer 1989     Historical DTP/aP 1988, 1988, 1988, 1988, 1989, 1992,  1999     Hpv, Unspecified  2013, 2013, 10/23/2013, 2014     MMR 1989, 1997     OPV, trivalent, live 1988, 1988, 1988, 1989, 1992     Polio, Unspecified  1988, 1988, 1988, 1989, 1992     Rubella Immunity: Titer/md Dx 2018     TD (ADULT, 7+) 02/15/2011     TDAP Vaccine (Adacel) 2019     Td (Adult), Adsorbed 1999, 02/15/2011     Tdap (Adacel,Boostrix) 2011, 2019, 2021     Tdap (Adult) Unspecified Formulation 2011       Past Medical History:   Diagnosis Date     Depressive disorder     no meds       Past Surgical History:   Procedure Laterality Date      SECTION N/A 2019    Procedure:  SECTION;  Surgeon: Brandee Jimenez MD;  Location:  L+D      SECTION       HEAD & NECK SURGERY      wisdom teeth extraction       Clinic vitals from today:  /68     Abdomen: gravid, single vertex fetus, non-tender, EFW 8 lbs    Constitutional: healthy, alert, active and no distress   Extremities: NT, no edema  Neurologic: Awake, alert, oriented x3  Neuropsychiatric: General: normal, calm and normal eye contact  Heart: Regular rate and rhythm  Lungs: clear to ausculation bilaterally    Brandee Jimenez MD

## 2021-07-28 ENCOUNTER — ANESTHESIA (OUTPATIENT)
Dept: OBGYN | Facility: CLINIC | Age: 36
End: 2021-07-28
Payer: COMMERCIAL

## 2021-07-28 ENCOUNTER — ANESTHESIA EVENT (OUTPATIENT)
Dept: OBGYN | Facility: CLINIC | Age: 36
End: 2021-07-28
Payer: COMMERCIAL

## 2021-07-28 ENCOUNTER — HOSPITAL ENCOUNTER (INPATIENT)
Facility: CLINIC | Age: 36
LOS: 3 days | Discharge: HOME HEALTH | End: 2021-07-31
Attending: OBSTETRICS & GYNECOLOGY | Admitting: OBSTETRICS & GYNECOLOGY
Payer: COMMERCIAL

## 2021-07-28 DIAGNOSIS — O34.219 PREVIOUS CESAREAN DELIVERY, ANTEPARTUM: ICD-10-CM

## 2021-07-28 DIAGNOSIS — Z98.891 STATUS POST CESAREAN DELIVERY: ICD-10-CM

## 2021-07-28 LAB
ABO/RH(D): NORMAL
ANTIBODY SCREEN: NEGATIVE
ERYTHROCYTE [DISTWIDTH] IN BLOOD BY AUTOMATED COUNT: 13.2 % (ref 10–15)
HCT VFR BLD AUTO: 35.5 % (ref 35–47)
HGB BLD-MCNC: 12.2 G/DL (ref 11.7–15.7)
MCH RBC QN AUTO: 33.1 PG (ref 26.5–33)
MCHC RBC AUTO-ENTMCNC: 34.4 G/DL (ref 31.5–36.5)
MCV RBC AUTO: 96 FL (ref 78–100)
PLATELET # BLD AUTO: 237 10E3/UL (ref 150–450)
RBC # BLD AUTO: 3.69 10E6/UL (ref 3.8–5.2)
SPECIMEN EXPIRATION DATE: NORMAL
WBC # BLD AUTO: 14.5 10E3/UL (ref 4–11)

## 2021-07-28 PROCEDURE — 250N000011 HC RX IP 250 OP 636: Performed by: REGISTERED NURSE

## 2021-07-28 PROCEDURE — 86900 BLOOD TYPING SEROLOGIC ABO: CPT | Performed by: OBSTETRICS & GYNECOLOGY

## 2021-07-28 PROCEDURE — 360N000076 HC SURGERY LEVEL 3, PER MIN: Performed by: OBSTETRICS & GYNECOLOGY

## 2021-07-28 PROCEDURE — 86780 TREPONEMA PALLIDUM: CPT | Performed by: OBSTETRICS & GYNECOLOGY

## 2021-07-28 PROCEDURE — 250N000011 HC RX IP 250 OP 636: Performed by: OBSTETRICS & GYNECOLOGY

## 2021-07-28 PROCEDURE — 85027 COMPLETE CBC AUTOMATED: CPT | Performed by: OBSTETRICS & GYNECOLOGY

## 2021-07-28 PROCEDURE — 250N000013 HC RX MED GY IP 250 OP 250 PS 637: Performed by: OBSTETRICS & GYNECOLOGY

## 2021-07-28 PROCEDURE — 258N000003 HC RX IP 258 OP 636: Performed by: OBSTETRICS & GYNECOLOGY

## 2021-07-28 PROCEDURE — 250N000011 HC RX IP 250 OP 636: Performed by: SURGERY

## 2021-07-28 PROCEDURE — 710N000010 HC RECOVERY PHASE 1, LEVEL 2, PER MIN: Performed by: OBSTETRICS & GYNECOLOGY

## 2021-07-28 PROCEDURE — 250N000013 HC RX MED GY IP 250 OP 250 PS 637

## 2021-07-28 PROCEDURE — 258N000003 HC RX IP 258 OP 636: Performed by: REGISTERED NURSE

## 2021-07-28 PROCEDURE — 59510 CESAREAN DELIVERY: CPT | Performed by: OBSTETRICS & GYNECOLOGY

## 2021-07-28 PROCEDURE — 272N000001 HC OR GENERAL SUPPLY STERILE: Performed by: OBSTETRICS & GYNECOLOGY

## 2021-07-28 PROCEDURE — 250N000009 HC RX 250: Performed by: OBSTETRICS & GYNECOLOGY

## 2021-07-28 PROCEDURE — 370N000017 HC ANESTHESIA TECHNICAL FEE, PER MIN: Performed by: OBSTETRICS & GYNECOLOGY

## 2021-07-28 PROCEDURE — 59514 CESAREAN DELIVERY ONLY: CPT | Mod: AS | Performed by: ADVANCED PRACTICE MIDWIFE

## 2021-07-28 PROCEDURE — 120N000012 HC R&B POSTPARTUM

## 2021-07-28 RX ORDER — CEFAZOLIN SODIUM 2 G/100ML
2 INJECTION, SOLUTION INTRAVENOUS SEE ADMIN INSTRUCTIONS
Status: DISCONTINUED | OUTPATIENT
Start: 2021-07-28 | End: 2021-07-28

## 2021-07-28 RX ORDER — BISACODYL 10 MG
10 SUPPOSITORY, RECTAL RECTAL DAILY PRN
Status: DISCONTINUED | OUTPATIENT
Start: 2021-07-30 | End: 2021-07-31 | Stop reason: HOSPADM

## 2021-07-28 RX ORDER — OXYCODONE HYDROCHLORIDE 5 MG/1
5 TABLET ORAL EVERY 4 HOURS PRN
Status: DISCONTINUED | OUTPATIENT
Start: 2021-07-28 | End: 2021-07-31 | Stop reason: HOSPADM

## 2021-07-28 RX ORDER — OXYTOCIN/0.9 % SODIUM CHLORIDE 30/500 ML
340 PLASTIC BAG, INJECTION (ML) INTRAVENOUS CONTINUOUS PRN
Status: DISCONTINUED | OUTPATIENT
Start: 2021-07-28 | End: 2021-07-31 | Stop reason: HOSPADM

## 2021-07-28 RX ORDER — LIDOCAINE 40 MG/G
CREAM TOPICAL
Status: DISCONTINUED | OUTPATIENT
Start: 2021-07-28 | End: 2021-07-31 | Stop reason: HOSPADM

## 2021-07-28 RX ORDER — DEXTROSE, SODIUM CHLORIDE, SODIUM LACTATE, POTASSIUM CHLORIDE, AND CALCIUM CHLORIDE 5; .6; .31; .03; .02 G/100ML; G/100ML; G/100ML; G/100ML; G/100ML
INJECTION, SOLUTION INTRAVENOUS CONTINUOUS
Status: DISCONTINUED | OUTPATIENT
Start: 2021-07-28 | End: 2021-07-31 | Stop reason: HOSPADM

## 2021-07-28 RX ORDER — KETOROLAC TROMETHAMINE 30 MG/ML
30 INJECTION, SOLUTION INTRAMUSCULAR; INTRAVENOUS EVERY 6 HOURS
Status: COMPLETED | OUTPATIENT
Start: 2021-07-28 | End: 2021-07-29

## 2021-07-28 RX ORDER — CITRIC ACID/SODIUM CITRATE 334-500MG
SOLUTION, ORAL ORAL
Status: COMPLETED
Start: 2021-07-28 | End: 2021-07-28

## 2021-07-28 RX ORDER — OXYTOCIN 10 [USP'U]/ML
10 INJECTION, SOLUTION INTRAMUSCULAR; INTRAVENOUS
Status: DISCONTINUED | OUTPATIENT
Start: 2021-07-28 | End: 2021-07-28

## 2021-07-28 RX ORDER — ACETAMINOPHEN 325 MG/1
975 TABLET ORAL EVERY 6 HOURS
Status: DISCONTINUED | OUTPATIENT
Start: 2021-07-28 | End: 2021-07-31 | Stop reason: HOSPADM

## 2021-07-28 RX ORDER — ONDANSETRON 4 MG/1
4 TABLET, ORALLY DISINTEGRATING ORAL EVERY 6 HOURS PRN
Status: DISCONTINUED | OUTPATIENT
Start: 2021-07-28 | End: 2021-07-31 | Stop reason: HOSPADM

## 2021-07-28 RX ORDER — METOCLOPRAMIDE HYDROCHLORIDE 5 MG/ML
INJECTION INTRAMUSCULAR; INTRAVENOUS
Status: DISCONTINUED
Start: 2021-07-28 | End: 2021-07-28 | Stop reason: HOSPADM

## 2021-07-28 RX ORDER — OXYTOCIN 10 [USP'U]/ML
10 INJECTION, SOLUTION INTRAMUSCULAR; INTRAVENOUS
Status: DISCONTINUED | OUTPATIENT
Start: 2021-07-28 | End: 2021-07-31 | Stop reason: HOSPADM

## 2021-07-28 RX ORDER — IBUPROFEN 400 MG/1
800 TABLET, FILM COATED ORAL EVERY 6 HOURS
Status: DISCONTINUED | OUTPATIENT
Start: 2021-07-29 | End: 2021-07-31 | Stop reason: HOSPADM

## 2021-07-28 RX ORDER — MISOPROSTOL 200 UG/1
800 TABLET ORAL
Status: DISCONTINUED | OUTPATIENT
Start: 2021-07-28 | End: 2021-07-28

## 2021-07-28 RX ORDER — METHYLERGONOVINE MALEATE 0.2 MG/ML
200 INJECTION INTRAVENOUS
Status: DISCONTINUED | OUTPATIENT
Start: 2021-07-28 | End: 2021-07-28

## 2021-07-28 RX ORDER — ACETAMINOPHEN 325 MG/1
TABLET ORAL
Status: DISCONTINUED
Start: 2021-07-28 | End: 2021-07-28 | Stop reason: HOSPADM

## 2021-07-28 RX ORDER — MODIFIED LANOLIN
OINTMENT (GRAM) TOPICAL
Status: DISCONTINUED | OUTPATIENT
Start: 2021-07-28 | End: 2021-07-31 | Stop reason: HOSPADM

## 2021-07-28 RX ORDER — AMOXICILLIN 250 MG
2 CAPSULE ORAL 2 TIMES DAILY
Status: DISCONTINUED | OUTPATIENT
Start: 2021-07-28 | End: 2021-07-31 | Stop reason: HOSPADM

## 2021-07-28 RX ORDER — PROCHLORPERAZINE 25 MG
25 SUPPOSITORY, RECTAL RECTAL EVERY 12 HOURS PRN
Status: DISCONTINUED | OUTPATIENT
Start: 2021-07-28 | End: 2021-07-31 | Stop reason: HOSPADM

## 2021-07-28 RX ORDER — ONDANSETRON 2 MG/ML
4 INJECTION INTRAMUSCULAR; INTRAVENOUS EVERY 6 HOURS PRN
Status: DISCONTINUED | OUTPATIENT
Start: 2021-07-28 | End: 2021-07-31 | Stop reason: HOSPADM

## 2021-07-28 RX ORDER — MISOPROSTOL 200 UG/1
400 TABLET ORAL
Status: DISCONTINUED | OUTPATIENT
Start: 2021-07-28 | End: 2021-07-31 | Stop reason: HOSPADM

## 2021-07-28 RX ORDER — MISOPROSTOL 200 UG/1
800 TABLET ORAL
Status: DISCONTINUED | OUTPATIENT
Start: 2021-07-28 | End: 2021-07-31 | Stop reason: HOSPADM

## 2021-07-28 RX ORDER — TRANEXAMIC ACID 10 MG/ML
1 INJECTION, SOLUTION INTRAVENOUS EVERY 30 MIN PRN
Status: DISCONTINUED | OUTPATIENT
Start: 2021-07-28 | End: 2021-07-31 | Stop reason: HOSPADM

## 2021-07-28 RX ORDER — NALOXONE HYDROCHLORIDE 0.4 MG/ML
0.2 INJECTION, SOLUTION INTRAMUSCULAR; INTRAVENOUS; SUBCUTANEOUS
Status: DISCONTINUED | OUTPATIENT
Start: 2021-07-28 | End: 2021-07-31 | Stop reason: HOSPADM

## 2021-07-28 RX ORDER — METOCLOPRAMIDE HYDROCHLORIDE 5 MG/ML
10 INJECTION INTRAMUSCULAR; INTRAVENOUS EVERY 6 HOURS PRN
Status: DISCONTINUED | OUTPATIENT
Start: 2021-07-28 | End: 2021-07-31 | Stop reason: HOSPADM

## 2021-07-28 RX ORDER — OXYTOCIN/0.9 % SODIUM CHLORIDE 30/500 ML
100-340 PLASTIC BAG, INJECTION (ML) INTRAVENOUS CONTINUOUS PRN
Status: DISCONTINUED | OUTPATIENT
Start: 2021-07-28 | End: 2021-07-28

## 2021-07-28 RX ORDER — HYDRALAZINE HYDROCHLORIDE 20 MG/ML
2.5-5 INJECTION INTRAMUSCULAR; INTRAVENOUS EVERY 10 MIN PRN
Status: DISCONTINUED | OUTPATIENT
Start: 2021-07-28 | End: 2021-07-28 | Stop reason: HOSPADM

## 2021-07-28 RX ORDER — AMOXICILLIN 250 MG
1 CAPSULE ORAL 2 TIMES DAILY
Status: DISCONTINUED | OUTPATIENT
Start: 2021-07-28 | End: 2021-07-31 | Stop reason: HOSPADM

## 2021-07-28 RX ORDER — CITRIC ACID/SODIUM CITRATE 334-500MG
30 SOLUTION, ORAL ORAL
Status: COMPLETED | OUTPATIENT
Start: 2021-07-28 | End: 2021-07-28

## 2021-07-28 RX ORDER — HYDROMORPHONE HYDROCHLORIDE 1 MG/ML
0.4 INJECTION, SOLUTION INTRAMUSCULAR; INTRAVENOUS; SUBCUTANEOUS EVERY 5 MIN PRN
Status: DISCONTINUED | OUTPATIENT
Start: 2021-07-28 | End: 2021-07-28 | Stop reason: HOSPADM

## 2021-07-28 RX ORDER — CARBOPROST TROMETHAMINE 250 UG/ML
250 INJECTION, SOLUTION INTRAMUSCULAR
Status: DISCONTINUED | OUTPATIENT
Start: 2021-07-28 | End: 2021-07-31 | Stop reason: HOSPADM

## 2021-07-28 RX ORDER — SODIUM CHLORIDE, SODIUM LACTATE, POTASSIUM CHLORIDE, CALCIUM CHLORIDE 600; 310; 30; 20 MG/100ML; MG/100ML; MG/100ML; MG/100ML
INJECTION, SOLUTION INTRAVENOUS CONTINUOUS
Status: DISCONTINUED | OUTPATIENT
Start: 2021-07-28 | End: 2021-07-28

## 2021-07-28 RX ORDER — PROCHLORPERAZINE MALEATE 10 MG
10 TABLET ORAL EVERY 6 HOURS PRN
Status: DISCONTINUED | OUTPATIENT
Start: 2021-07-28 | End: 2021-07-31 | Stop reason: HOSPADM

## 2021-07-28 RX ORDER — NALOXONE HYDROCHLORIDE 0.4 MG/ML
0.4 INJECTION, SOLUTION INTRAMUSCULAR; INTRAVENOUS; SUBCUTANEOUS
Status: DISCONTINUED | OUTPATIENT
Start: 2021-07-28 | End: 2021-07-31 | Stop reason: HOSPADM

## 2021-07-28 RX ORDER — ACETAMINOPHEN 325 MG/1
975 TABLET ORAL ONCE
Status: COMPLETED | OUTPATIENT
Start: 2021-07-28 | End: 2021-07-28

## 2021-07-28 RX ORDER — TRANEXAMIC ACID 10 MG/ML
1 INJECTION, SOLUTION INTRAVENOUS EVERY 30 MIN PRN
Status: DISCONTINUED | OUTPATIENT
Start: 2021-07-28 | End: 2021-07-28

## 2021-07-28 RX ORDER — LIDOCAINE 40 MG/G
CREAM TOPICAL
Status: DISCONTINUED | OUTPATIENT
Start: 2021-07-28 | End: 2021-07-28

## 2021-07-28 RX ORDER — KETOROLAC TROMETHAMINE 30 MG/ML
INJECTION, SOLUTION INTRAMUSCULAR; INTRAVENOUS PRN
Status: DISCONTINUED | OUTPATIENT
Start: 2021-07-28 | End: 2021-07-28

## 2021-07-28 RX ORDER — SIMETHICONE 80 MG
80 TABLET,CHEWABLE ORAL 4 TIMES DAILY PRN
Status: DISCONTINUED | OUTPATIENT
Start: 2021-07-28 | End: 2021-07-31 | Stop reason: HOSPADM

## 2021-07-28 RX ORDER — OXYTOCIN/0.9 % SODIUM CHLORIDE 30/500 ML
340 PLASTIC BAG, INJECTION (ML) INTRAVENOUS CONTINUOUS PRN
Status: COMPLETED | OUTPATIENT
Start: 2021-07-28 | End: 2021-07-28

## 2021-07-28 RX ORDER — MISOPROSTOL 200 UG/1
400 TABLET ORAL
Status: DISCONTINUED | OUTPATIENT
Start: 2021-07-28 | End: 2021-07-28

## 2021-07-28 RX ORDER — HYDROCORTISONE 2.5 %
CREAM (GRAM) TOPICAL 3 TIMES DAILY PRN
Status: DISCONTINUED | OUTPATIENT
Start: 2021-07-28 | End: 2021-07-31 | Stop reason: HOSPADM

## 2021-07-28 RX ORDER — ONDANSETRON 4 MG/1
4 TABLET, ORALLY DISINTEGRATING ORAL EVERY 30 MIN PRN
Status: DISCONTINUED | OUTPATIENT
Start: 2021-07-28 | End: 2021-07-28 | Stop reason: HOSPADM

## 2021-07-28 RX ORDER — METHYLERGONOVINE MALEATE 0.2 MG/ML
200 INJECTION INTRAVENOUS
Status: DISCONTINUED | OUTPATIENT
Start: 2021-07-28 | End: 2021-07-31 | Stop reason: HOSPADM

## 2021-07-28 RX ORDER — MORPHINE SULFATE 1 MG/ML
INJECTION, SOLUTION EPIDURAL; INTRATHECAL; INTRAVENOUS
Status: COMPLETED | OUTPATIENT
Start: 2021-07-28 | End: 2021-07-28

## 2021-07-28 RX ORDER — LABETALOL HYDROCHLORIDE 5 MG/ML
10 INJECTION, SOLUTION INTRAVENOUS
Status: DISCONTINUED | OUTPATIENT
Start: 2021-07-28 | End: 2021-07-28 | Stop reason: HOSPADM

## 2021-07-28 RX ORDER — SODIUM CHLORIDE, SODIUM LACTATE, POTASSIUM CHLORIDE, CALCIUM CHLORIDE 600; 310; 30; 20 MG/100ML; MG/100ML; MG/100ML; MG/100ML
INJECTION, SOLUTION INTRAVENOUS CONTINUOUS
Status: DISCONTINUED | OUTPATIENT
Start: 2021-07-28 | End: 2021-07-28 | Stop reason: HOSPADM

## 2021-07-28 RX ORDER — NALBUPHINE HYDROCHLORIDE 10 MG/ML
2.5-5 INJECTION, SOLUTION INTRAMUSCULAR; INTRAVENOUS; SUBCUTANEOUS EVERY 6 HOURS PRN
Status: DISCONTINUED | OUTPATIENT
Start: 2021-07-28 | End: 2021-07-28

## 2021-07-28 RX ORDER — ONDANSETRON 2 MG/ML
INJECTION INTRAMUSCULAR; INTRAVENOUS PRN
Status: DISCONTINUED | OUTPATIENT
Start: 2021-07-28 | End: 2021-07-28

## 2021-07-28 RX ORDER — CARBOPROST TROMETHAMINE 250 UG/ML
250 INJECTION, SOLUTION INTRAMUSCULAR
Status: DISCONTINUED | OUTPATIENT
Start: 2021-07-28 | End: 2021-07-28

## 2021-07-28 RX ORDER — CEFAZOLIN SODIUM 2 G/100ML
INJECTION, SOLUTION INTRAVENOUS
Status: DISCONTINUED
Start: 2021-07-28 | End: 2021-07-28 | Stop reason: HOSPADM

## 2021-07-28 RX ORDER — METOCLOPRAMIDE 10 MG/1
10 TABLET ORAL EVERY 6 HOURS PRN
Status: DISCONTINUED | OUTPATIENT
Start: 2021-07-28 | End: 2021-07-31 | Stop reason: HOSPADM

## 2021-07-28 RX ORDER — ONDANSETRON 2 MG/ML
4 INJECTION INTRAMUSCULAR; INTRAVENOUS EVERY 30 MIN PRN
Status: DISCONTINUED | OUTPATIENT
Start: 2021-07-28 | End: 2021-07-28 | Stop reason: HOSPADM

## 2021-07-28 RX ORDER — CEFAZOLIN SODIUM 2 G/100ML
2 INJECTION, SOLUTION INTRAVENOUS
Status: COMPLETED | OUTPATIENT
Start: 2021-07-28 | End: 2021-07-28

## 2021-07-28 RX ORDER — BUPIVACAINE HYDROCHLORIDE 7.5 MG/ML
INJECTION, SOLUTION INTRASPINAL
Status: COMPLETED | OUTPATIENT
Start: 2021-07-28 | End: 2021-07-28

## 2021-07-28 RX ADMIN — CEFAZOLIN SODIUM 2 G: 2 INJECTION, SOLUTION INTRAVENOUS at 07:26

## 2021-07-28 RX ADMIN — BUPIVACAINE HYDROCHLORIDE IN DEXTROSE 1.6 ML: 7.5 INJECTION, SOLUTION SUBARACHNOID at 07:25

## 2021-07-28 RX ADMIN — KETOROLAC TROMETHAMINE 30 MG: 30 INJECTION, SOLUTION INTRAMUSCULAR; INTRAVENOUS at 20:33

## 2021-07-28 RX ADMIN — ONDANSETRON 4 MG: 2 INJECTION INTRAMUSCULAR; INTRAVENOUS at 13:58

## 2021-07-28 RX ADMIN — Medication 340 ML/HR: at 07:46

## 2021-07-28 RX ADMIN — ACETAMINOPHEN 975 MG: 325 TABLET, FILM COATED ORAL at 05:51

## 2021-07-28 RX ADMIN — SENNOSIDES AND DOCUSATE SODIUM 1 TABLET: 8.6; 5 TABLET ORAL at 20:34

## 2021-07-28 RX ADMIN — KETOROLAC TROMETHAMINE 30 MG: 30 INJECTION, SOLUTION INTRAMUSCULAR; INTRAVENOUS at 13:58

## 2021-07-28 RX ADMIN — ACETAMINOPHEN 975 MG: 325 TABLET, FILM COATED ORAL at 12:03

## 2021-07-28 RX ADMIN — MORPHINE SULFATE 0.1 MG: 1 INJECTION, SOLUTION EPIDURAL; INTRATHECAL; INTRAVENOUS at 07:25

## 2021-07-28 RX ADMIN — SODIUM CHLORIDE, SODIUM LACTATE, POTASSIUM CHLORIDE, CALCIUM CHLORIDE AND DEXTROSE MONOHYDRATE: 5; 600; 310; 30; 20 INJECTION, SOLUTION INTRAVENOUS at 12:03

## 2021-07-28 RX ADMIN — Medication 30 ML: at 07:02

## 2021-07-28 RX ADMIN — ONDANSETRON 4 MG: 2 INJECTION INTRAMUSCULAR; INTRAVENOUS at 07:26

## 2021-07-28 RX ADMIN — METOCLOPRAMIDE HYDROCHLORIDE 10 MG: 5 INJECTION INTRAMUSCULAR; INTRAVENOUS at 09:39

## 2021-07-28 RX ADMIN — SODIUM CITRATE AND CITRIC ACID MONOHYDRATE 30 ML: 500; 334 SOLUTION ORAL at 07:02

## 2021-07-28 RX ADMIN — KETOROLAC TROMETHAMINE 30 MG: 30 INJECTION, SOLUTION INTRAMUSCULAR at 08:21

## 2021-07-28 RX ADMIN — ACETAMINOPHEN 975 MG: 325 TABLET, FILM COATED ORAL at 18:11

## 2021-07-28 RX ADMIN — SODIUM CHLORIDE, POTASSIUM CHLORIDE, SODIUM LACTATE AND CALCIUM CHLORIDE: 600; 310; 30; 20 INJECTION, SOLUTION INTRAVENOUS at 05:47

## 2021-07-28 RX ADMIN — PHENYLEPHRINE HYDROCHLORIDE 0.5 MCG/KG/MIN: 10 INJECTION INTRAVENOUS at 07:25

## 2021-07-28 ASSESSMENT — MIFFLIN-ST. JEOR: SCORE: 1617.61

## 2021-07-28 NOTE — PLAN OF CARE
Data: Bridgett Brady transferred to 436 via cart at 1025. Baby transferred via parent's arms.  Action: Receiving unit notified of transfer: Yes. Patient and family notified of room change. Report given to Nathan BENITEZ RN at 1025. Belongings sent to receiving unit. Accompanied by Registered Nurse. Oriented patient to surroundings. Call light within reach. ID bands double-checked with receiving RN.  Response: Patient tolerated transfer and is stable.

## 2021-07-28 NOTE — ANESTHESIA PROCEDURE NOTES
Intrathecal Procedure Note  Pre-Procedure   Staff -        Anesthesiologist:  Jimi Finnegan MD       Performed By: anesthesiologist       Location: OR       Pre-Anesthestic Checklist: patient identified, IV checked, risks and benefits discussed, informed consent, monitors and equipment checked, pre-op evaluation and at physician/surgeon's request  Timeout:       Correct Patient: Yes        Correct Procedure: Yes        Correct Site: Yes        Correct Position: Yes   Procedure Documentation  Procedure: intrathecal       Patient Position: sitting       Patient Prep/Sterile Barriers: sterile gloves, mask, patient draped       Skin prep: Betadine       Insertion Site: L3-4. (midline approach).       Needle Gauge: 24.        Needle Length (Inches): 3.5        Spinal Needle Type: Edwina-Shmuel       Introducer used       Introducer: 20 G      # of attempts: 1 and  # of redirects: : 0.    Assessment/Narrative         Paresthesias: No.       CSF fluid: clear.    Medication(s) Administered   0.75% Hyperbaric Bupivacaine (Intrathecal), 1.6 mL  Morphine PF 1 mg/mL (Intrathecal), 0.1 mg  Medication Administration Time: 7/28/2021 7:25 AM    Comments:  Patient tolerated the procedure well and without complications.  Patient laid flat immediately.

## 2021-07-28 NOTE — OP NOTE
Procedure Date: 2021    PREOPERATIVE DIAGNOSES:  1.  39+3 weeks' gestation.  2.  Prior  section, declines trial of labor.    POSTOPERATIVE DIAGNOSES:    1.  39+3 weeks' gestation.  2.  Prior  section, declines trial of labor.    PROCEDURE:  Repeat low transverse  section.    SURGEON:  Brandee Jimenez M.D.    ASSISTANT:  Lana Valderrama, Certified Nurse Midwife.    Lana' assistance was needed for retraction and visualization given the repeat nature of the surgery.    ANESTHESIA:  Spinal.    COMPLICATIONS:  None.    ESTIMATED BLOOD LOSS:  390 mL.    FINDINGS:  Female infant in the right occiput transverse position with a weight of 7 pounds 8 ounces and Apgars of 9 and 9.  Normal-appearing uterus, tubes, and bilateral ovaries.  Thin lower uterine segment noted.    INDICATIONS:  Bridgett is a 35-year-old G2, P1 who was initially followed by the midwife service, but due to history of prior  section for nonreassuring fetal wellbeing transferred to the MD service upon deciding a repeat  section.  Risks, benefits and alternatives of surgery were discussed and final decision was made.      PROCEDURE:   Bridgett was taken to the operating room where spinal anesthesia was administered without difficulty.  She was then prepared and draped in the normal sterile fashion in dorsal supine position with left lateral tilt.  Timeout was performed to identify correct patient and procedure.  Pfannenstiel skin incision was made over preexisting scar and this was carried down to the underlying layer of fascia using the Bovie.  Fascia was incised in the midline and this incision was extended laterally using the Verdugo scissors.  Superior aspect of the fascial incision was then grasped with the Kocher clamps x 2, elevated, and the rectus muscles dissected.  Attention was similarly turned to the inferior border.  Rectus muscles were then  in the midline.  The peritoneum was identified, tented and  entered sharply.  This incision was extended both superiorly and inferiorly with good visualization of the bladder.  The bladder blade was then placed and the vesicouterine peritoneum was identified and bladder flap created.  At this time, a very thin lower uterine segment was noted with only thin membranes noted between abdominal cavity and baby.  Amniotomy was performed for clear fluid and infant was delivered from right occiput transverse position without difficulty.  Infant was suctioned and placed on maternal abdomen.  Cord clamping was delayed by 1 minute.  Infant was handed off to awaiting nurses.  Placenta was delivered manually and intact.  The uterus was exteriorized and cleared of all clot and debris.  Ring clamps were placed on the uterine incision and this was reapproximated using an 0 Vicryl in a running locked fashion.  A second imbricating layer was also placed with excellent hemostasis obtained.  Uterus was then replaced into the pelvic cavity and gutters were cleared of all clot and debris.  One small area in the upper left incisional angle was reapproximated using a figure-of-eight suture.  The peritoneum was then reapproximated using a 3-0 Vicryl in a running fashion.  Fascia was reapproximated using an 0 looped PDS in a running fashion.  Irrigation was performed prior to skin closure, which was completed using a 4-0 Monocryl in a subcuticular stitch.  Bandages were placed and Margarita was taken to the recovery room in stable condition.    Brandee Jimenez MD        D: 2021   T: 2021   MT: [facility]    Name:     MARGARITA MARTINEZ  MRN:      [id number]        Account:        [id number]   :      1985           Procedure Date: 2021     Document: [id number]    cc:  Brandee Jimenez MD

## 2021-07-28 NOTE — PLAN OF CARE
Soft BP, vitals otherwise WDL. Bleeding minimal. Some nausea, emesis x2, declines nausea medication. Tolerating clear liquids, ice chips, and crackers. Hernandez removed around noon. IV infusing. Working on breastfeeding infant. Denies pain. Continue to monitor.

## 2021-07-28 NOTE — PLAN OF CARE
2 para 1 at 39 3/7 weeks gestation here for repeat  section.  External monitors applied, assessment and admission completed.   present and supportive.

## 2021-07-28 NOTE — ANESTHESIA PREPROCEDURE EVALUATION
Anesthesia Pre-Procedure Evaluation    Patient: Bridgett Brady   MRN: [id number] : 1985        Preoperative Diagnosis: Previous  delivery, antepartum [O34.219]   Procedure : Procedure(s):  REPEAT  SECTION     Past Medical History:   Diagnosis Date     Depressive disorder     no meds      Past Surgical History:   Procedure Laterality Date      SECTION N/A 2019    Procedure:  SECTION;  Surgeon: Brandee Jimenez MD;  Location:  L+D      SECTION       HEAD & NECK SURGERY      wisdom teeth extraction      Allergies   Allergen Reactions     Amoxicillin      Reaction as a child      Social History     Tobacco Use     Smoking status: Never Smoker     Smokeless tobacco: Never Used   Substance Use Topics     Alcohol use: Not Currently      Wt Readings from Last 1 Encounters:   21 88 kg (194 lb)        Anesthesia Evaluation            ROS/MED HX  ENT/Pulmonary:  - neg pulmonary ROS     Neurologic:  - neg neurologic ROS     Cardiovascular:       METS/Exercise Tolerance: >4 METS    Hematologic:  - neg hematologic  ROS     Musculoskeletal:  - neg musculoskeletal ROS     GI/Hepatic:  - neg GI/hepatic ROS     Renal/Genitourinary:  - neg Renal ROS     Endo:  - neg endo ROS     Psychiatric/Substance Use:     (+) psychiatric history depression     Infectious Disease:       Malignancy:       Other:            Physical Exam    Airway        Mallampati: II   TM distance: > 3 FB   Neck ROM: full   Mouth opening: > 3 cm    Respiratory Devices and Support         Dental  no notable dental history         Cardiovascular   cardiovascular exam normal          Pulmonary   pulmonary exam normal                OUTSIDE LABS:  CBC:   Lab Results   Component Value Date    WBC 14.5 (H) 2021    WBC 11.3 (H) 2021    HGB 12.2 2021    HGB 11.3 (L) 2021    HCT 35.5 2021    HCT 34.3 (L) 2021     2021     2021     BMP:   Lab Results    Component Value Date     06/25/2019    POTASSIUM 4.1 06/25/2019    CHLORIDE 106 06/25/2019    CO2 21 06/25/2019    BUN 8 06/25/2019    CR 0.70 06/25/2019    GLC 35 (LL) 06/25/2019     COAGS: No results found for: PTT, INR, FIBR  POC: No results found for: BGM, HCG, HCGS  HEPATIC:   Lab Results   Component Value Date    ALBUMIN 3.0 (L) 06/25/2019    PROTTOTAL 6.6 (L) 06/25/2019    ALT 25 06/25/2019    AST 21 06/25/2019    ALKPHOS 130 06/25/2019    BILITOTAL 0.3 06/25/2019     OTHER:   Lab Results   Component Value Date    CAROL 8.8 06/25/2019       Anesthesia Plan    ASA Status:  2   NPO Status:  NPO Appropriate    Anesthesia Type: Spinal.              Consents    Anesthesia Plan(s) and associated risks, benefits, and realistic alternatives discussed. Questions answered and patient/representative(s) expressed understanding.     - Discussed with:  Patient         Postoperative Care    Pain management: IV analgesics, Multi-modal analgesia, Neuraxial analgesia.   PONV prophylaxis: Ondansetron (or other 5HT-3)     Comments:                Jimi Finnegan MD

## 2021-07-28 NOTE — L&D DELIVERY NOTE
Obstetrics Brief Operative Note    Pre-operative diagnosis: 39+3wks gestation  Prior  section; declines trial of labor   Post-operative diagnosis: Same   Procedure: Repeat low transverse  section   Surgeon: Brandee Jimenez MD   Assistant(s): Lana Mendez CNM   Anesthesia: Spinal anesthesia   Estimated blood loss: 390mL   Complications: None   Findings: Live   Female  Cephalic presentation:  right occiput transverse  APGARS: 1 minute: 9   5 minute: 9  Weight: 7lbs 8oz  Placenta: normal  Tubes: normal  Uterus: normal; thin lower uterine segment  Ovaries: normal     Primary OB: Tony

## 2021-07-28 NOTE — PLAN OF CARE
Pt arrived to unit via bed with infant in arms. Safety education reviewed with family. Pt nauseas/emesis x1. VSS.

## 2021-07-28 NOTE — PLAN OF CARE
VSS  Pain minimal  Vaginal bleeding light flow FF U/1  Pt unable to move legs yet, therefore spence catheter remains in place.

## 2021-07-28 NOTE — ANESTHESIA POSTPROCEDURE EVALUATION
Patient: Bridgett Brady    Procedure(s):  REPEAT  SECTION    Diagnosis:Previous  delivery, antepartum [O34.219]  Diagnosis Additional Information: No value filed.    Anesthesia Type:  Spinal    Note:  Disposition: Inpatient   Postop Pain Control: Uneventful            Sign Out: Well controlled pain   PONV: No   Neuro/Psych: Uneventful            Sign Out: Acceptable/Baseline neuro status   Airway/Respiratory: Uneventful            Sign Out: Acceptable/Baseline resp. status   CV/Hemodynamics: Uneventful            Sign Out: Acceptable CV status   Other NRE: NONE   DID A NON-ROUTINE EVENT OCCUR? No    Event details/Postop Comments:  Recovery for SAB/RA not complete but expected to resolve within 48 hours.           Last vitals:  Vitals Value Taken Time   /57 21 0930   Temp 36.4  C (97.5  F) 21 0830   Pulse 64 21 0930   Resp 16 21 0930   SpO2 99 % 21 0930       Electronically Signed By: Jimi Finnegan MD  2021  10:26 AM

## 2021-07-29 LAB
HGB BLD-MCNC: 10.8 G/DL (ref 11.7–15.7)
T PALLIDUM AB SER QL: NONREACTIVE

## 2021-07-29 PROCEDURE — 250N000013 HC RX MED GY IP 250 OP 250 PS 637: Performed by: OBSTETRICS & GYNECOLOGY

## 2021-07-29 PROCEDURE — 36415 COLL VENOUS BLD VENIPUNCTURE: CPT | Performed by: OBSTETRICS & GYNECOLOGY

## 2021-07-29 PROCEDURE — 250N000011 HC RX IP 250 OP 636: Performed by: OBSTETRICS & GYNECOLOGY

## 2021-07-29 PROCEDURE — 85018 HEMOGLOBIN: CPT | Performed by: OBSTETRICS & GYNECOLOGY

## 2021-07-29 PROCEDURE — 120N000012 HC R&B POSTPARTUM

## 2021-07-29 RX ADMIN — IBUPROFEN 800 MG: 400 TABLET ORAL at 14:12

## 2021-07-29 RX ADMIN — SENNOSIDES AND DOCUSATE SODIUM 1 TABLET: 8.6; 5 TABLET ORAL at 20:32

## 2021-07-29 RX ADMIN — OXYCODONE HYDROCHLORIDE 5 MG: 5 TABLET ORAL at 18:37

## 2021-07-29 RX ADMIN — SENNOSIDES AND DOCUSATE SODIUM 2 TABLET: 8.6; 5 TABLET ORAL at 09:16

## 2021-07-29 RX ADMIN — ACETAMINOPHEN 975 MG: 325 TABLET, FILM COATED ORAL at 00:07

## 2021-07-29 RX ADMIN — ACETAMINOPHEN 975 MG: 325 TABLET, FILM COATED ORAL at 18:25

## 2021-07-29 RX ADMIN — IBUPROFEN 800 MG: 400 TABLET ORAL at 20:32

## 2021-07-29 RX ADMIN — ACETAMINOPHEN 975 MG: 325 TABLET, FILM COATED ORAL at 05:59

## 2021-07-29 RX ADMIN — IBUPROFEN 800 MG: 400 TABLET ORAL at 09:15

## 2021-07-29 RX ADMIN — ACETAMINOPHEN 975 MG: 325 TABLET, FILM COATED ORAL at 12:40

## 2021-07-29 RX ADMIN — KETOROLAC TROMETHAMINE 30 MG: 30 INJECTION, SOLUTION INTRAMUSCULAR; INTRAVENOUS at 02:23

## 2021-07-29 NOTE — PLAN OF CARE
Vital signs stable. Postpartum assessment WDL. Incision dressing is c/d/I, pt will call after showering to remove dressing for incision assessment. Pain controlled with scheduled tylenol,  ibuprofen, and oxycodone 5 mg.  Also has an abd binder in room. Patient ambulating independently. Patient denies passing gas but has positve BS in all 4 quads, ambulation enc. Breastfeeding on cue with no assist. Patient and infant bonding well. Will continue with current plan of care.

## 2021-07-29 NOTE — PLAN OF CARE
Vital signs are WNL and fundus is midline and firm.  Lochia is light to scant.  She is voiding adequately  (300 mL) and nurse encouraged her to drink more water.  Patient was nauseated during day shift but it has resolved.  Pain is well controlled with Tylenol and Toradol. Spouse is present and very supportive.  They are working on breastfeeding infant.

## 2021-07-29 NOTE — PROGRESS NOTES
EFEM Courtesy Rounds    Bridgett is doing well, baby has had some difficulty latching. Has not been seen by lactation. Peds recommended first visit with NP/PA with lactation specialty. Thinking about possible discharge tomorrow but will see how she feels tomorrow.    LITZY Glasgow, EFEM

## 2021-07-29 NOTE — PLAN OF CARE
VS WDL. Pain well controlled with tylenol and ibuprofen. Voiding without difficulties. Abdominal dressing clean/dry/intact. Fundus firm with scant flow, no clots per patient. Working on breastfeeding, encouraged pt with call with latch check. Bonding well with infant. Continue to monitor with current plan of care.

## 2021-07-29 NOTE — PROGRESS NOTES
[facility]    Obstetrics Post-Op / Progress Note    Assessment & Plan   Assessment:  -1 Day Post-Op  Procedure(s):  REPEAT  SECTION    Doing well.  No immediate surgical complications identified.  No excessive bleeding  Pain well-controlled.    Plan:  Ambulation encouraged  Hemoglobin in AM  Lactation consultation  Monitor wound for signs of infection  Pain control measures as needed  Reportable signs and symptoms dicussed with the patient  Anticipate discharge in 1-2 days    Brandee Jimenez     Interval History   Doing well.  Pain is well-controlled.  No fevers.  No history of wound drainage, warmth or significant erythema.  Good appetite.  Denies chest pain, shortness of breath, nausea or vomiting.  Ambulatory.  Breastfeeding well.  Nausea from yesterday afternoon resolved --tolerated dinner without issues.  Pain well controlled.  Some itching    Medications     dextrose 5% lactated ringers 125 mL/hr at 21 1203     - MEDICATION INSTRUCTIONS -       - MEDICATION INSTRUCTIONS -       oxytocin in 0.9% NaCl         acetaminophen  975 mg Oral Q6H     ibuprofen  800 mg Oral Q6H     senna-docusate  1 tablet Oral BID    Or     senna-docusate  2 tablet Oral BID     sodium chloride (PF)  3 mL Intracatheter Q8H       Physical Exam   Temp: 98.5  F (36.9  C) Temp src: Oral BP: 92/50 Pulse: 83   Resp: 16 SpO2: 100 % O2 Device: None (Room air)    Vitals:    21 0525   Weight: 88 kg (194 lb)     Vital Signs with Ranges  Temp:  [97.5  F (36.4  C)-99.6  F (37.6  C)] 98.5  F (36.9  C)  Pulse:  [59-83] 83  Resp:  [14-19] 16  BP: ()/(41-73) 92/50  SpO2:  [97 %-100 %] 100 %  I/O last 3 completed shifts:  In: 1140 [P.O.:240; I.V.:900]  Out: 1565 [Urine:1175; Blood:390]    Uterine fundus is firm, non-tender and at the level of the umbilicus  Incision C/D/I  Extremities Non-tender    Data   Recent Labs   Lab Test 21  0537 20  0902   ABO  --  O   RH  --  Pos   AS Negative Neg      Recent Labs   Lab Test 07/28/21  0538 05/14/21  0809   HGB 12.2 11.3*     Recent Labs   Lab Test 12/28/20  0902   RUQIGG 30

## 2021-07-30 PROCEDURE — 120N000012 HC R&B POSTPARTUM

## 2021-07-30 PROCEDURE — 250N000013 HC RX MED GY IP 250 OP 250 PS 637: Performed by: OBSTETRICS & GYNECOLOGY

## 2021-07-30 RX ORDER — IBUPROFEN 800 MG/1
800 TABLET, FILM COATED ORAL EVERY 6 HOURS PRN
Qty: 30 TABLET | Refills: 0 | Status: SHIPPED | OUTPATIENT
Start: 2021-07-30 | End: 2021-07-30

## 2021-07-30 RX ORDER — OXYCODONE HYDROCHLORIDE 5 MG/1
5 TABLET ORAL EVERY 6 HOURS PRN
Qty: 20 TABLET | Refills: 0 | Status: SHIPPED | OUTPATIENT
Start: 2021-07-30 | End: 2021-09-28

## 2021-07-30 RX ORDER — IBUPROFEN 800 MG/1
800 TABLET, FILM COATED ORAL EVERY 6 HOURS PRN
Qty: 60 TABLET | Refills: 1 | Status: SHIPPED | OUTPATIENT
Start: 2021-07-30 | End: 2021-09-28

## 2021-07-30 RX ORDER — OXYCODONE HYDROCHLORIDE 5 MG/1
5 TABLET ORAL EVERY 6 HOURS PRN
Qty: 18 TABLET | Refills: 0 | Status: SHIPPED | OUTPATIENT
Start: 2021-07-30 | End: 2021-07-30

## 2021-07-30 RX ORDER — AMOXICILLIN 250 MG
1 CAPSULE ORAL 2 TIMES DAILY
Qty: 30 TABLET | Refills: 1 | Status: SHIPPED | OUTPATIENT
Start: 2021-07-30 | End: 2021-09-28

## 2021-07-30 RX ADMIN — SENNOSIDES AND DOCUSATE SODIUM 2 TABLET: 8.6; 5 TABLET ORAL at 19:27

## 2021-07-30 RX ADMIN — OXYCODONE HYDROCHLORIDE 5 MG: 5 TABLET ORAL at 00:32

## 2021-07-30 RX ADMIN — ACETAMINOPHEN 975 MG: 325 TABLET, FILM COATED ORAL at 06:21

## 2021-07-30 RX ADMIN — IBUPROFEN 800 MG: 400 TABLET ORAL at 04:49

## 2021-07-30 RX ADMIN — SENNOSIDES AND DOCUSATE SODIUM 1 TABLET: 8.6; 5 TABLET ORAL at 09:38

## 2021-07-30 RX ADMIN — ACETAMINOPHEN 975 MG: 325 TABLET, FILM COATED ORAL at 12:16

## 2021-07-30 RX ADMIN — OXYCODONE HYDROCHLORIDE 5 MG: 5 TABLET ORAL at 09:37

## 2021-07-30 RX ADMIN — IBUPROFEN 800 MG: 400 TABLET ORAL at 19:27

## 2021-07-30 RX ADMIN — ACETAMINOPHEN 975 MG: 325 TABLET, FILM COATED ORAL at 19:27

## 2021-07-30 RX ADMIN — IBUPROFEN 800 MG: 400 TABLET ORAL at 12:17

## 2021-07-30 RX ADMIN — OXYCODONE HYDROCHLORIDE 5 MG: 5 TABLET ORAL at 13:38

## 2021-07-30 RX ADMIN — OXYCODONE HYDROCHLORIDE 5 MG: 5 TABLET ORAL at 04:49

## 2021-07-30 RX ADMIN — ACETAMINOPHEN 975 MG: 325 TABLET, FILM COATED ORAL at 00:32

## 2021-07-30 NOTE — PROGRESS NOTES
[facility]    Obstetrics Post-Op / Progress Note    Assessment & Plan   Assessment:  -2 Days Post-Op  Procedure(s):  REPEAT  SECTION    Doing well.  No immediate surgical complications identified.  No excessive bleeding  Pain well-controlled.    Plan:  Ambulation encouraged  Lactation consultation  Monitor wound for signs of infection  Pain control measures as needed  Reportable signs and symptoms dicussed with the patient  Possible discharge later today --otherwise plan for tomorrow; follow up with me in 6 weeks    Brandee Jimenez     Interval History   Doing well.  Pain is well-controlled.  No fevers.  No history of wound drainage, warmth or significant erythema.  Good appetite.  Denies chest pain, shortness of breath, nausea or vomiting.  Ambulatory.  Breastfeeding well.  No issues overnight.  Feeling good.      Medications     dextrose 5% lactated ringers 125 mL/hr at 21 1203     - MEDICATION INSTRUCTIONS -       - MEDICATION INSTRUCTIONS -       oxytocin in 0.9% NaCl         acetaminophen  975 mg Oral Q6H     ibuprofen  800 mg Oral Q6H     senna-docusate  1 tablet Oral BID    Or     senna-docusate  2 tablet Oral BID     sodium chloride (PF)  3 mL Intracatheter Q8H       Physical Exam   Temp: 98.5  F (36.9  C) Temp src: Oral BP: 93/47 (Pt states this BP is normal for her.) Pulse: 72   Resp: 16 SpO2: 97 % O2 Device: None (Room air)    Vitals:    21 0525   Weight: 88 kg (194 lb)     Vital Signs with Ranges  Temp:  [98.3  F (36.8  C)-98.6  F (37  C)] 98.5  F (36.9  C)  Pulse:  [70-73] 72  Resp:  [16-18] 16  BP: (93-98)/(47-60) 93/47  SpO2:  [97 %] 97 %  No intake/output data recorded.    Uterine fundus is firm, non-tender and at the level of the umbilicus  Incision C/D/I  Extremities Non-tender    Data   Recent Labs   Lab Test 21  0537 20  0902   ABO  --  O   RH  --  Pos   AS Negative Neg     Recent Labs   Lab Test 21  1101 21  0538   HGB 10.8*  12.2     Recent Labs   Lab Test 12/28/20  0902   RUQIGG 30

## 2021-07-30 NOTE — PROGRESS NOTES
DONY Courtesy Round PPD#2    Doing well, breast feeding is going well.   Planning discharge home today or tomorrow.   No concerns.  Will plan F/U with Dr. Tony MCGHEE CNM

## 2021-07-30 NOTE — PLAN OF CARE
Vital signs stable. Postpartum assessment WDL. Incisional dressing clean, dry, and intact. Pain controlled with Tylenol/Ibuprofen/Oxycodone. Patient ambulating independently. Patient passing gas. Breastfeeding on cue with no assist. Patient and infant bonding well. Will continue with current plan of care.

## 2021-07-30 NOTE — LACTATION NOTE
"Initial visit with Bridgett, KIYA, and baby girl. Bridgett shares this breast feeding experience has been very different from her son who was 2 week postdates and LGA--he nursed like \"crazy\" per Bridgett. This little girl has more of a dainty eater. . .enjoying her \"Birthday Nap\".  Reassured Bridgett that day 1 sleepiness (birthday nap) is normal followed by cluster-feeding on day (night) 2, so to expect an increase in breastfeeding overnight tonight!    Bridgett mendoza latch has been a little pinchy. Did attempt to entice infant to wake to \"practice\" but she wasn't willing to participate. But we did practice techniques to obtain/maintain deep latch (including nose to nipple alignment and supporting infant's shoulder blades vs head when bringing infant in to latch). Encouraged Bridgett to request assistance as needed with latching overnight!    Highlighted breast feeding section in our \"Guide to Postpartum and  Care.\" Emphasized importance of skin to skin for enhancing early breastfeeding success!     Discussed  breastfeeding basics:   1) Watch for early feeding cues (licking lips, stirring or rooting, sucking movement with mouth, hands to mouth)  2) Feed infant on demand, a minimum of 8 times in 24 hours (recommended waking infant if it's been 3 hours since last feeding)  3) Techniques to waking a sleepy baby to nurse: (undress infant, change diaper if necessary, gently stroking bottom of feet and back, snuggling infant skin to skin, expressing colostrum).         Appreciative of visit.    Keke Greene RN, IBCLC            "

## 2021-07-31 VITALS
WEIGHT: 194 LBS | TEMPERATURE: 98.6 F | SYSTOLIC BLOOD PRESSURE: 107 MMHG | HEIGHT: 67 IN | DIASTOLIC BLOOD PRESSURE: 60 MMHG | RESPIRATION RATE: 16 BRPM | HEART RATE: 78 BPM | OXYGEN SATURATION: 97 % | BODY MASS INDEX: 30.45 KG/M2

## 2021-07-31 PROCEDURE — 250N000013 HC RX MED GY IP 250 OP 250 PS 637: Performed by: OBSTETRICS & GYNECOLOGY

## 2021-07-31 RX ADMIN — IBUPROFEN 800 MG: 400 TABLET ORAL at 01:32

## 2021-07-31 RX ADMIN — ACETAMINOPHEN 975 MG: 325 TABLET, FILM COATED ORAL at 01:32

## 2021-07-31 RX ADMIN — IBUPROFEN 800 MG: 400 TABLET ORAL at 07:03

## 2021-07-31 RX ADMIN — ACETAMINOPHEN 975 MG: 325 TABLET, FILM COATED ORAL at 07:03

## 2021-07-31 RX ADMIN — OXYCODONE HYDROCHLORIDE 5 MG: 5 TABLET ORAL at 01:32

## 2021-07-31 RX ADMIN — SENNOSIDES AND DOCUSATE SODIUM 2 TABLET: 8.6; 5 TABLET ORAL at 07:03

## 2021-07-31 NOTE — PLAN OF CARE
Fundus firm and bleeding wnl.  VSS.  Incision clean, dry, intact and covered with steri strips.  Rates pain 6/10 and taking scheduled tylenol, ibuprofen and oxycodone with good relief.  Up independently.  Using abdominal binder.  Passing flatus and tolerating regular diet.  Encouraged to call with questions or concerns.

## 2021-07-31 NOTE — PLAN OF CARE
Vital signs and assessments wnl. Patient is up , voiding , pain  controlled with  medications given as prescribed. Encouraged to continue to ambulate as able and void frequently. Patient is bonding well with infant.

## 2021-07-31 NOTE — PLAN OF CARE
Vital signs are stable.  Fundus is firm, one finger under umbilicus.  Scant vaginal flow.  Incision is clean dry with steri-strips.  Takes ibuprofen and tylenol for discomfort.  Breastfeeding is going well.  Will continue to monitor.

## 2021-07-31 NOTE — PROGRESS NOTES
[facility]    Obstetrics Post-Op / Progress Note    Assessment & Plan   Assessment:  -3 Days Post-Op  Procedure(s):  REPEAT  SECTION    Doing well.  Clean wound without signs of infection.  No excessive bleeding  Pain well-controlled.    Plan:  Ambulation encouraged  Lactation consultation  Pain control measures as needed  Reportable signs and symptoms dicussed with the patient  Discharge later today  Follow up with me in 6wks for routine PP visit    Brandee Jimenez     Interval History   Doing well.  Pain is well-controlled.  No fevers.  No history of wound drainage, warmth or significant erythema.  Good appetite.  Denies chest pain, shortness of breath, nausea or vomiting.  Ambulatory.  Breastfeeding well.  No issues overnight    Medications     dextrose 5% lactated ringers 125 mL/hr at 21 1203     - MEDICATION INSTRUCTIONS -       - MEDICATION INSTRUCTIONS -       oxytocin in 0.9% NaCl         acetaminophen  975 mg Oral Q6H     ibuprofen  800 mg Oral Q6H     senna-docusate  1 tablet Oral BID    Or     senna-docusate  2 tablet Oral BID     sodium chloride (PF)  3 mL Intracatheter Q8H       Physical Exam   Temp: 98.2  F (36.8  C) Temp src: Oral BP: 103/49 Pulse: 78   Resp: 16        Vitals:    21 0525   Weight: 88 kg (194 lb)     Vital Signs with Ranges  Temp:  [98.2  F (36.8  C)] 98.2  F (36.8  C)  Pulse:  [78] 78  Resp:  [16] 16  BP: (103)/(49) 103/49  No intake/output data recorded.    Uterine fundus is firm, non-tender and at the level of the umbilicus  Incision C/D/I  Extremities Non-tender    Data   Recent Labs   Lab Test 21  0537 20  0902   ABO  --  O   RH  --  Pos   AS Negative Neg     Recent Labs   Lab Test 21  1101 21  0538   HGB 10.8* 12.2     Recent Labs   Lab Test 20  0902   RUQIGG 30

## 2021-08-05 NOTE — DISCHARGE SUMMARY
Service Date: 2021  Discharge Date: 2021    HOSPITAL COURSE:  Bridgett is a 35-year-old G2, P2 who was admitted to the postpartum service following scheduled repeat  section.  Bridgett delivered a healthy female infant with a weight of 7 pounds 8 ounces and Apgars of 9 and 9 via uncomplicated repeat  section on the early morning hours of 2021.  Please refer to operative dictation regarding details of that procedure.  Bridgett's postoperative hospital course was overall uncomplicated.  Pain was well controlled using a combination of IV as well as oral medications.  Hernandez catheter was discontinued on postoperative day 0 and she is able to void without issues.  Bridgett was allowed to advance her diet with return of bowel function, was tolerating regular diet prior to discharge home.  Bridgett remained hemodynamically stable throughout postoperative hospital stay with a postoperative hemoglobin of 10.8 g/dL.  She showed no sign or symptoms of infection throughout postoperative hospital course . Bridgett was discharged to home on postoperative day #3 after meeting all requirements for discharge.  She has verbalized understanding of discharge instructions and was given a list of contact numbers should any issues or problems arise.  Bridgett will follow up with me for routine postpartum visit in 6 weeks' time.    Brandee Jimenez MD        D: 2021   T: 2021   MT: LENY/KINGS    Name:     BRIDGETT MARTINEZ  MRN:      [id number]        Account:      [id number]   :      1985           Service Date: 2021                                  Discharge Date: 2021     Document: [id number]    cc:  Brandee Jimenez MD

## 2021-09-27 PROBLEM — O34.219 HISTORY OF CESAREAN SECTION COMPLICATING PREGNANCY: Status: RESOLVED | Noted: 2020-12-28 | Resolved: 2021-09-27

## 2021-09-27 PROBLEM — O34.219 PREVIOUS CESAREAN DELIVERY, ANTEPARTUM: Status: RESOLVED | Noted: 2021-05-27 | Resolved: 2021-09-27

## 2021-09-28 ENCOUNTER — PRENATAL OFFICE VISIT (OUTPATIENT)
Dept: OBGYN | Facility: CLINIC | Age: 36
End: 2021-09-28
Payer: COMMERCIAL

## 2021-09-28 VITALS — SYSTOLIC BLOOD PRESSURE: 110 MMHG | BODY MASS INDEX: 27.57 KG/M2 | DIASTOLIC BLOOD PRESSURE: 68 MMHG | WEIGHT: 176 LBS

## 2021-09-28 DIAGNOSIS — Z98.891 STATUS POST CESAREAN DELIVERY: ICD-10-CM

## 2021-09-28 DIAGNOSIS — Z30.017 INSERTION OF IMPLANTABLE SUBDERMAL CONTRACEPTIVE: ICD-10-CM

## 2021-09-28 PROBLEM — Z34.90 SUPERVISION OF NORMAL PREGNANCY: Status: RESOLVED | Noted: 2018-12-05 | Resolved: 2021-09-28

## 2021-09-28 PROCEDURE — 99207 PR POST PARTUM EXAM: CPT | Mod: 25 | Performed by: OBSTETRICS & GYNECOLOGY

## 2021-09-28 PROCEDURE — 11981 INSERTION DRUG DLVR IMPLANT: CPT | Performed by: OBSTETRICS & GYNECOLOGY

## 2021-09-28 ASSESSMENT — ANXIETY QUESTIONNAIRES
GAD7 TOTAL SCORE: 5
5. BEING SO RESTLESS THAT IT IS HARD TO SIT STILL: NOT AT ALL
IF YOU CHECKED OFF ANY PROBLEMS ON THIS QUESTIONNAIRE, HOW DIFFICULT HAVE THESE PROBLEMS MADE IT FOR YOU TO DO YOUR WORK, TAKE CARE OF THINGS AT HOME, OR GET ALONG WITH OTHER PEOPLE: NOT DIFFICULT AT ALL
6. BECOMING EASILY ANNOYED OR IRRITABLE: NOT AT ALL
2. NOT BEING ABLE TO STOP OR CONTROL WORRYING: SEVERAL DAYS
3. WORRYING TOO MUCH ABOUT DIFFERENT THINGS: SEVERAL DAYS
7. FEELING AFRAID AS IF SOMETHING AWFUL MIGHT HAPPEN: SEVERAL DAYS
1. FEELING NERVOUS, ANXIOUS, OR ON EDGE: SEVERAL DAYS

## 2021-09-28 ASSESSMENT — PATIENT HEALTH QUESTIONNAIRE - PHQ9
5. POOR APPETITE OR OVEREATING: SEVERAL DAYS
SUM OF ALL RESPONSES TO PHQ QUESTIONS 1-9: 1

## 2021-09-28 NOTE — PATIENT INSTRUCTIONS
Follow up with your primary care provider for your other medical problems.  Continue self breast exam.  Increase physical activity and exercise.  Usual safety and preventative measures counseling done.  Weight loss encouraged.  Flu Shot declined today.  Last pap smear (2018) was normal and negative for the DNA of high risk HPV subtypes.  No pap was obtained this year.  This was discussed with the patient and she agrees with the plan.  Nexplanon placed today without issues.  Will use back up contraception for first week of implant.

## 2021-09-28 NOTE — PROGRESS NOTES
"  SUBJECTIVE:  Bridgett Brady,  is here for a postpartum visit.  She had a  Section  on 21 delivering a healthy baby girl, named Eusebia weighing 7 lbs 7 oz at term.      HPI:  Here today for postpartum visit --doing well.  Baby Eusebia has done great!  Good eater and sleeper.  Slept through the night last night!!  Exclusively breast feeding.    Bridgett has done well.  Eating/drinking well.  No pain issues/concerns.  Denies bowel/bladder issues.  Has not resumed SA.  Would like nexplanon today --had between kids and did well.    Moods good.  Denies depression or anxiety issues.  \"normal amount of worry\"  -returns to work on Monday  -declines flu shot today; got COVID vaccines during pregnancy    Last PHQ-9 score on record=   PHQ-9 SCORE 2021   PHQ-9 Total Score 1     BREANNE-7 SCORE 2019   Total Score 2 3 5       Pap: (No results found for: PAP )  18     Delivery complications:  No  Breast feeding:  Yes  Bladder problems:  No  Bowel problems/hemorrhoids:  No  Episiotomy/laceration/incision healed? Yes some incisional pain  Vaginal flow:  None  Clarissa:  No  Contraception: Nexplanon  Emotional adjustment:  doing well and happy  Back to work:  Goes back on 10/4    12 point review of systems negative other than symptoms noted below or in the HPI.    OBJECTIVE:  Vitals: /68   Wt 79.8 kg (176 lb)   LMP 10/25/2020   Breastfeeding Yes   BMI 27.57 kg/m    BMI= Body mass index is 27.57 kg/m .  General - pleasant female in no acute distress.  Breast -  deferred  Abdomen - Incision well-healed  Pelvic - EG: normal adult female, BUS: within normal limits, Vagina: well rugated, no discharge, Cervix: no lesions or CMT, Uterus: firm, normal sized and nontender, midplane in position. Adnexae: no masses or tenderness.  Rectovaginal - deferred.    ASSESSMENT:    ICD-10-CM    1. Encounter for postpartum visit  Z39.2    2. Insertion of implantable subdermal contraceptive  Z30.017 " etonogestrel (NEXPLANON) subdermal implant 68 mg     INSERTION NON-BIODEGRADABLE DRUG DELIVERY IMPLANT   3. Status post  delivery  Z98.891        PLAN:  May resume normal activities without restrictions.  Pap smear was not done today.    Full counseling was provided, and all questions were answered.   Return to clinic in one year for an annual visit.     Patient Instructions   Follow up with your primary care provider for your other medical problems.  Continue self breast exam.  Increase physical activity and exercise.  Usual safety and preventative measures counseling done.  Weight loss encouraged.  Flu Shot declined today.  Last pap smear () was normal and negative for the DNA of high risk HPV subtypes.  No pap was obtained this year.  This was discussed with the patient and she agrees with the plan.  Nexplanon placed today without issues.  Will use back up contraception for first week of implant.    Brandee Jimenez MD      Nexplanon Insertion:    Is a pregnancy test required: No.  Was a consent obtained?  Yes    Subjective: Bridgett Brady is a 35 year old  presents for Nexplanon.    Patient has been given the opportunity to ask questions about all forms of birth control, including all options appropriate for Bridgett Brady. Discussed that no method of birth control, except abstinence is 100% effective against pregnancy or sexually transmitted infection.     Bridgett Brady understands she may have the Nexplanon removed at any time and it should be removed by a health care provider.    The entire insertion procedure was reviewed with the patient, including care after placement.    Patient's last menstrual period was 10/25/2020. Last sexual activity: Prior to delivery. No allergy to betadine or shellfish. Patient declines STD screening  No results found for: HCG      /68   Wt 79.8 kg (176 lb)   LMP 10/25/2020   Breastfeeding Yes   BMI 27.57 kg/m      PROCEDURE NOTE: -- Nexplanon  Insertion    Reason for Insertion: contraception    Patient was placed supine with left arm exposed.  Chevy was made 8-10 cm above medial epicondyle and a guiding chevy 4 cm above the first.  Arm was prepped with Betadine. Insertion point was anesthetized with 5 mL 1% lidocaine. After stretching the skin with thumb and index finger around the insertion site, skin punctured with the tip of the needle inserted at 30 degrees and then lowered to horizontal position. The needle was then advanced to its full length. Applicator was then stabilized and slider was unlocked. Slider was pulled back until it stopped and then removed.    Correct placement of the implant was confirmed by palpation in the patient's arm and visualizing the purple top of the obturator.   Bandage and pressure dressing applied to insertion site.    Lot # [id number]  Exp: 2024  NDC- 1258-5190-73    EBL: minimal    Complications: none    ASSESSMENT:     ICD-10-CM    1. Encounter for postpartum visit  Z39.2    2. Insertion of implantable subdermal contraceptive  Z30.017 etonogestrel (NEXPLANON) subdermal implant 68 mg     INSERTION NON-BIODEGRADABLE DRUG DELIVERY IMPLANT   3. Status post  delivery  Z98.891         PLAN:    Given 's handouts, including when to have Nexplanon removed, list of danger s/sx, side effects and follow up recommended. Encouraged condom use for prevention of STD. Back up contraception advised for 7 days. Advised to call for any fever, for prolonged or severe pain or bleeding, abnormal vaginal dischage. She was advised to use pain medications (ibuprofen) as needed for mild to moderate pain.     Brandee Jimenez MD

## 2021-09-29 ASSESSMENT — ANXIETY QUESTIONNAIRES: GAD7 TOTAL SCORE: 5

## 2021-10-02 ENCOUNTER — HEALTH MAINTENANCE LETTER (OUTPATIENT)
Age: 36
End: 2021-10-02

## 2021-12-13 ENCOUNTER — APPOINTMENT (OUTPATIENT)
Dept: LAB | Facility: CLINIC | Age: 36
End: 2021-12-13

## 2021-12-28 ENCOUNTER — TELEPHONE (OUTPATIENT)
Dept: OBGYN | Facility: CLINIC | Age: 36
End: 2021-12-28

## 2021-12-28 NOTE — TELEPHONE ENCOUNTER
Pt came home this afternoon vomiting suddenly.  Asking what she can take while breast feeding.    Recommended allowing herself to vomit - body needs to get it out. If no vomiting for an hour, try sips of fluids - gatorade/pedialyte, sprite/ginger ale, then advance only as tolerated to saltine crackers, toast.  Can try OTC meclizine for nausea but really may vomit that out. If continued n/v and noticing lethargy, low urine output and other sx's of dehydration - ER for fluids.    Pt verbalized understanding, in agreement with plan, and voiced no further questions.  Maddison Salazar RN on 12/28/2021 at 4:07 PM

## 2021-12-28 NOTE — TELEPHONE ENCOUNTER
Patient came home from work today with the Flu. She cannot stop throwing up. She is wanting to ask us if there is a flu medication she can take that is safe for breastfeeding. Please call her back today

## 2022-09-03 ENCOUNTER — HEALTH MAINTENANCE LETTER (OUTPATIENT)
Age: 37
End: 2022-09-03

## 2022-10-06 ENCOUNTER — OFFICE VISIT (OUTPATIENT)
Dept: MIDWIFE SERVICES | Facility: CLINIC | Age: 37
End: 2022-10-06
Payer: COMMERCIAL

## 2022-10-06 VITALS
SYSTOLIC BLOOD PRESSURE: 105 MMHG | BODY MASS INDEX: 27.34 KG/M2 | HEIGHT: 67 IN | WEIGHT: 174.2 LBS | DIASTOLIC BLOOD PRESSURE: 60 MMHG

## 2022-10-06 DIAGNOSIS — Z30.011 OCP (ORAL CONTRACEPTIVE PILLS) INITIATION: Primary | ICD-10-CM

## 2022-10-06 DIAGNOSIS — R63.5 WEIGHT GAIN: ICD-10-CM

## 2022-10-06 DIAGNOSIS — Z30.46 ENCOUNTER FOR NEXPLANON REMOVAL: ICD-10-CM

## 2022-10-06 PROCEDURE — 11982 REMOVE DRUG IMPLANT DEVICE: CPT | Performed by: NURSE PRACTITIONER

## 2022-10-06 PROCEDURE — 36415 COLL VENOUS BLD VENIPUNCTURE: CPT | Performed by: NURSE PRACTITIONER

## 2022-10-06 PROCEDURE — 99213 OFFICE O/P EST LOW 20 MIN: CPT | Mod: 25 | Performed by: NURSE PRACTITIONER

## 2022-10-06 PROCEDURE — 84443 ASSAY THYROID STIM HORMONE: CPT | Performed by: NURSE PRACTITIONER

## 2022-10-06 RX ORDER — DROSPIRENONE AND ETHINYL ESTRADIOL 0.02-3(28)
1 KIT ORAL DAILY
Qty: 84 TABLET | Refills: 0 | Status: SHIPPED | OUTPATIENT
Start: 2022-10-06 | End: 2022-12-27

## 2022-10-06 RX ORDER — RNA INGREDIENT BNT-162B2 0.23 G/1.8ML
INJECTION, SUSPENSION INTRAMUSCULAR
COMMUNITY
Start: 2021-11-18 | End: 2024-01-25

## 2022-10-06 RX ORDER — PREDNISONE 20 MG/1
20 TABLET ORAL
COMMUNITY
Start: 2021-06-13 | End: 2022-10-06

## 2022-10-06 NOTE — PROGRESS NOTES
Nexplanon Removal:     Is a pregnancy test required: No.  Was a consent obtained?  Yes    Bridgett Brady is here for removal of etonogestrel implant Nexplanon/Implanon    Indication: bleeding, pain at insertion site, patient request      Preoperative Diagnosis: etonogestrel implant  Postoperative Diagnosis: etonogestrel implant removed    Technique: On the left arm  Skin prep Betadine  Anesthesia 1% lidocaine, with epi  Procedure: Small incision (<5mm) was made at distal end of palpable implant, curved hemostat or mosquito forceps was used to isolate the implant and bring it to the incision, the fibrous capsule containing the implant  was incised and the Implant was removed intact.  EBL: minimal  Complications:  No  Tolerance:  Pt tolerated procedure well and was in stable condition.   Dressing:    A pressure bandage was placed for the next 12-24 hours.    Contraception was discussed and patient chose the following method oral contraceptives.  She has no contraindications present to using LÓPEZ.    The use of the oral contraceptive pill has been fully discussed with the patient. This includes the proper method to initiate  and continue the pill, the need for regular compliance to ensure adequate contraceptive effect, the physiology which makes the pill effective, the instructions for what to do in event of a missed pill, and warnings about anticipated minor side effects such as breakthrough spotting, nausea, breast tenderness, weight changes, acne, headaches, etc. She was informed of the irregular bleeding pattern that can occur when the pill is first started or a new form is changed over for the first 2-3 months.  She has been told of the more serious potential side effects such as MI, stroke, and deep vein thrombosis, all of which are very unlikely.  She has been asked to report any signs of such serious problems immediately.   She understands and wishes to take the medication as prescribed.  TSH done today d/t wt  gain, difficulty losing weight    Follow up: Pt was instructed to call if bleeding, severe pain or foul smell.     return to clinic 3 months for birth control check    LITZY Dueñas CNM

## 2022-10-08 LAB — TSH SERPL DL<=0.005 MIU/L-ACNC: 1.12 MU/L (ref 0.4–4)

## 2022-12-08 ENCOUNTER — TELEPHONE (OUTPATIENT)
Dept: MIDWIFE SERVICES | Facility: CLINIC | Age: 37
End: 2022-12-08

## 2022-12-08 NOTE — TELEPHONE ENCOUNTER
Patient states that she had a 'weird pain' when she was pregnant. Was not overly worries, but did think it would go away after pregnancy.  Last pregnancy was 7/28/21.    Pain does still occur a few times a week. Mainly notices it when she bends over. States it is her URQ of her abdomin. Does feel a small bulge in the area when she is experiencing pain. bulge disappears when apin goes away.  Pt is concerned for possible hernia.  Recommending PCP appt for further evaluation.  Pt verbalized understanding, in agreement with plan, and voiced no further questions.  Dimitri Sifuentes RN on 12/8/2022 at 1:15 PM       Walk in / drive in

## 2022-12-27 DIAGNOSIS — Z30.011 OCP (ORAL CONTRACEPTIVE PILLS) INITIATION: ICD-10-CM

## 2022-12-27 RX ORDER — DROSPIRENONE AND ETHINYL ESTRADIOL 0.02-3(28)
1 KIT ORAL DAILY
Qty: 28 TABLET | Refills: 0 | Status: SHIPPED | OUTPATIENT
Start: 2022-12-27 | End: 2024-01-25

## 2022-12-27 NOTE — TELEPHONE ENCOUNTER
"Requested Prescriptions   Pending Prescriptions Disp Refills     drospirenone-ethinyl estradiol (EASTON) 3-0.02 MG tablet 84 tablet 0     Sig: Take 1 tablet by mouth daily       Contraceptives Protocol Passed - 12/27/2022  3:22 PM        Passed - Patient is not a current smoker if age is 35 or older        Passed - Recent (12 mo) or future (30 days) visit within the authorizing provider's specialty     Patient has had an office visit with the authorizing provider or a provider within the authorizing providers department within the previous 12 mos or has a future within next 30 days. See \"Patient Info\" tab in inbasket, or \"Choose Columns\" in Meds & Orders section of the refill encounter.              Passed - Medication is active on med list        Passed - No active pregnancy on record        Passed - No positive pregnancy test in past 12 months           Last Written Prescription Date:  10/06/2022  Last Fill Quantity: 84,  # refills: 0   Last office visit: 10/6/2022 with prescribing provider:  Janis Pineda -return to clinic 3 months for birth control check  LITZY Dueñas CNM    "

## 2023-01-14 ENCOUNTER — HEALTH MAINTENANCE LETTER (OUTPATIENT)
Age: 38
End: 2023-01-14

## 2023-01-23 DIAGNOSIS — Z30.011 OCP (ORAL CONTRACEPTIVE PILLS) INITIATION: ICD-10-CM

## 2023-01-23 RX ORDER — DROSPIRENONE AND ETHINYL ESTRADIOL 0.02-3(28)
1 KIT ORAL DAILY
Qty: 28 TABLET | Refills: 0 | OUTPATIENT
Start: 2023-01-23

## 2023-01-23 NOTE — TELEPHONE ENCOUNTER
Requested Prescriptions   Pending Prescriptions Disp Refills     drospirenone-ethinyl estradiol (EASTON) 3-0.02 MG tablet 28 tablet 0     Sig: Take 1 tablet by mouth daily       There is no refill protocol information for this order        Last Written Prescription Date:  12/27/22  Last Fill Quantity: 28,  # refills: 0   Last office visit: Visit date not found with prescribing provider:  Miriam   Future Office Visit:      Refill denied  Appointment needed for further refills  Amanda Elder RN on 1/23/2023 at 9:52 AM

## 2023-04-18 NOTE — PLAN OF CARE
VSS. Voiding adequately on own. Scant vag bleeding. Incision dressing CDI. Pain controlled w/ toradol and tylenol. Breast feeding well.  Ambulating to bathroom independently.    Carac Counseling:  I discussed with the patient the risks of Carac including but not limited to erythema, scaling, itching, weeping, crusting, and pain.

## 2023-06-01 NOTE — TELEPHONE ENCOUNTER
"Chief Complaint   Patient presents with   • Prostate Cancer     Follow-up 9 month   • Follow-up        HPI  Mr. Berg is a 78 y.o. male with history of BPH s/p Urolift 07/26/22 and low-grade prostate cancer on AS who presents for follow up.     At this visit, states that his UroLift did not actually help his symptoms at all.  He denies new frequency, dysuria, hematuria.  He is still off of Proscar.     IPSS Questionnaire (AUA-7):  Over the past month…    1)  Incomplete Emptying  How often have you had a sensation of not emptying your bladder?  4 - More than half the time   2)  Frequency  How often have you had to urinate less than every two hours? 2 - Less than half the time   3)  Intermittency  How often have you found you stopped and started again several times when you urinated?  3 - About half the time   4) Urgency  How often have you found it difficult to postpone urination?  0 - Not at all   5) Weak Stream  How often have you had a weak urinary stream?  4 - More than half the time   6) Straining  How often have you had to push or strain to begin urination?  0 - Not at all   7) Nocturia  How many times did you typically get up at night to urinate?  3 - 3 times   Total Score:  16       Quality of life due to urinary symptoms:  If you were to spend the rest of your life with your urinary condition the way it is now, how would you feel about that? 3-Mixed   Urine Leakage (Incontinence) 0-No Leakage         Past Medical History:   Diagnosis Date   • Allergic rhinitis    • Anemia    • Arthritis    • Asthma    • Benign prostatic hypertrophy    • Chest pain     \"I'VE SEEN DR. CARRASCO FOR IT A LONG TIME AGO, AND THAT'S WHEN HE DID TESTING\".  STATES HAS NOT MENTIONED IT TO DR. KHAN.  STATES UNSURE OF WHEN LAST TIME WAS\".  PT STATES \"I'M NOT A COMPLAINER\".  STATES COLD WEATHER AND WALKING BRING IT ON.  GETS SHORTNESS OF BREATH OCCASSIONALLY.  MORE DESCRIBED AS PRESSURE.    • Diverticulitis    • ED (erectile " COVID TEST 7/25/2021 4p RAULITO Martinez  Surgery Scheduler     "dysfunction)    • Elevated cholesterol    • GERD (gastroesophageal reflux disease)    • H/O echocardiogram 2003   • Hiatal hernia    • [place] (hard of hearing)     SPOUSE REPORTS NO USE OF HEARING AIDS   • Hx of exercise stress test     SPOUSE REPORTS LAST DONE BY DR ALMANZA AROUND JUNE 2018 AND REPORTED ALL WNL'S AND MD RELEASED PATIENT   • Hyperlipidemia    • Hypertension    • Irregular heart beat    • Irritable bowel syndrome    • Obstructive sleep apnea     NO CPAP -  states lost weight, and doesn't have it now.   • Peripheral neuropathy    • Prostate cancer    • Short of breath on exertion    • Skin cancer    • Type 2 diabetes mellitus    • Urinary frequency    • Vitamin B 12 deficiency    • Vitamin D deficiency        Past Surgical History:   Procedure Laterality Date   • APPENDECTOMY     • BACK SURGERY      Dr. Montenegro - 1997?   • CARDIAC CATHETERIZATION  2003?    \"IT WAS OK\"   • CHOLECYSTECTOMY WITH INTRAOPERATIVE CHOLANGIOGRAM N/A 05/15/2018    Procedure: CHOLECYSTECTOMY LAPAROSCOPIC INTRAOPERATIVE CHOLANGIOGRAM;  Surgeon: Eric Bran MD;  Location: [facility] OR;  Service: General   • COLONOSCOPY N/A 08/08/2018    Procedure: COLONOSCOPY;  Surgeon: Eric Bran MD;  Location: [facility] ENDOSCOPY;  Service: Gastroenterology   • COLONOSCOPY N/A 1/27/2023    Procedure: COLONOSCOPY with biopsy;  Surgeon: Ora Mari MD;  Location: [facility] ENDOSCOPY;  Service: Gastroenterology;  Laterality: N/A;   • ENDOSCOPY     • HAND SURGERY Left    • KNEE ARTHROSCOPY Left    • MOUTH SURGERY      FULL MOUTH EXTRACTION   • PROSTATE SURGERY  07/26/2022    Maury Rodriguez   • REPLACEMENT TOTAL KNEE Left 12/2022   • SKIN BIOPSY     • SKIN CANCER EXCISION  07/22/2019    skin cancer on head   • VASECTOMY  1977         Current Outpatient Medications:   •  ACCU-CHEK SOFTCLIX LANCETS lancets, 1 each by Other route 3 (Three) Times a Day. Use as instructed, Disp: 300 each, Rfl: 3  •  aspirin 81 MG EC tablet, Take 1 tablet by " "mouth Daily., Disp: , Rfl:   •  B Complex-C (B-complex with vitamin C) tablet, Take 1 tablet by mouth Daily., Disp: 90 tablet, Rfl: 3  •  bisacodyl 5 MG EC tablet, Use as directed., Disp: 4 tablet, Rfl: 0  •  Blood Gluc Meter Disp-Strips device, 1 strip 3 (Three) Times a Day. Patient needs Accu-chek meter and test strips., Disp: 1 each, Rfl: 0  •  Blood Glucose Calibration (SURECHEK CONTROL SOLUTION) NORMAL liquid, 1 bottle by In Vitro route every 30 (thirty) days., Disp: 3 each, Rfl: 3  •  Blood Glucose Monitoring Suppl (Accu-Chek Alondra Plus) w/Device kit, for testing sugar daily, Disp: 1 kit, Rfl: 0  •  Cholecalciferol (VITAMIN D) 2000 UNITS tablet, Take 1 capsule by mouth Daily., Disp: , Rfl:   •  gabapentin (Neurontin) 300 MG capsule, Take 2 capsules by mouth 3 (Three) Times a Day., Disp: 180 capsule, Rfl: 3  •  glimepiride (AMARYL) 4 MG tablet, Take 1 tablet by mouth Daily., Disp: 90 tablet, Rfl: 3  •  glucose blood (Accu-Chek Alondra Plus) test strip, 1 each by Other route As Needed (daily as needed). Use as instructed, Disp: 300 each, Rfl: 3  •  losartan-hydrochlorothiazide (HYZAAR) 50-12.5 MG per tablet, Take 1 tablet by mouth Daily., Disp: 90 tablet, Rfl: 3  •  SITagliptin (Januvia) 100 MG tablet, Take 1 tablet by mouth Daily., Disp: 90 tablet, Rfl: 3  •  ciprofloxacin (Cipro) 500 MG tablet, Take 1 tablet by mouth 2 (Two) Times a Day for 14 days., Disp: 28 tablet, Rfl: 0     Physical Exam  Visit Vitals  /76   Pulse 68   Temp 97.2 °F (36.2 °C)   Ht 182.9 cm (72\")   Wt 93.9 kg (207 lb)   SpO2 98%   BMI 28.07 kg/m²       Labs  Brief Urine Lab Results  (Last result in the past 365 days)      Color   Clarity   Blood   Leuk Est   Nitrite   Protein   CREAT   Urine HCG        06/01/23 1352 Yellow   Clear   Negative   Large (3+)   Positive   Negative                 Lab Results   Component Value Date    GLUCOSE 152 (H) 05/15/2023    CALCIUM 10.8 (H) 05/15/2023     05/15/2023    K 4.9 05/15/2023    CO2 28.4 " 05/15/2023     05/15/2023    BUN 30 (H) 05/15/2023    CREATININE 1.21 05/15/2023    EGFRIFAFRI 71 08/05/2021    EGFRIFNONA 59 (L) 08/05/2021    BCR 24.8 05/15/2023    ANIONGAP 5.0 08/16/2016       Lab Results   Component Value Date    WBC 5.61 05/15/2023    HGB 13.4 05/15/2023    HCT 38.8 05/15/2023    MCV 91.7 05/15/2023     05/15/2023            Lab Results   Component Value Date    PSA 3.050 05/15/2023    PSA 1.160 04/28/2022    PSA 1.020 05/12/2021         Assessment  78 y.o. male with BPH s/p Urolift 07/26/22 and low-grade prostate cancer on AS.    His UA today reveals large leukocyte esterase and nitrite positivity.  His PSA has also more than doubled over the past year, now above 3.0.  This combined with his IPSS increased from 10-16 in the past year is concerning for underlying infection.  I have advised the patient that we will treat him empirically, obtain urine PCR, and repeat his PSA once his urine has improved.    He states understanding and is eager to have this resolved prior to his family trip to [place] at the end of June.    Plan  1. Obtain urinary PCR  2. Cipro BID   3. FU with PSA in 3 weeks (will collect at visit only if UA has improved)

## 2023-11-30 ENCOUNTER — TELEPHONE (OUTPATIENT)
Dept: MIDWIFE SERVICES | Facility: CLINIC | Age: 38
End: 2023-11-30
Payer: COMMERCIAL

## 2023-11-30 NOTE — TELEPHONE ENCOUNTER
LMP 10/20, 5w6d  Pt calling, newly pregnant and just went to the bathroom and she is bleeding 'pretty bad'  Bleeding is red in color, was in the toilet. Not noted in underwear.  Denies any pain or cramping  Denies any recent constipation or intercourse.    Discussed ER precautions  Pt verbalized understanding, in agreement with plan, and voiced no further questions.  Routing pt DrAvailablehart message to provider to advise.  Dimitri Sifuentes RN on 11/30/2023 at 12:05 PM

## 2023-11-30 NOTE — TELEPHONE ENCOUNTER
Sorry to hear; sounds like we'll just have her keep an eye on it and that you've covered ED precautions which is all we can do at this point

## 2023-11-30 NOTE — TELEPHONE ENCOUNTER
Reviewed ER precautions    Pt will take home UPT in 2 weeks. Knows to wait to have a normal cycle before trying to conceive again.    Will keep us updated  Amanda Elder RN on 11/30/2023 at 12:42 PM

## 2024-01-24 NOTE — PROGRESS NOTES
HPI:  Bridgett Brady is a 38 year old female  presents for evaluation of abnormal uterine bleeding as a referral from Pia Rutherford      HPI:     Here to discuss vaginal bleeding she has had for the last 3 weeks.   Experienced a miscarriage the very end of /beginning of 2023. States at that time the bleeding was initially heavy and then tapered off and resolved after 2 weeks, which would have been in the middle of December.    She was not evaluated in the clinic for her miscarriage during that time. She reached out through [email or web address], instead. She was instructed by the clinic RN to take a HPT 2 weeks after the bleeding ended. Bridgett reports she took 3 HPTs and all were positive. She did not take any further tests or follow up with a provider.     After that initial 2 weeks of bleeding she had no bleeding again until . On  she was in [place] and experienced a large gush of blood and passed a large clot. She bleed for the rest of the day and then it began to lighten. She has been bleeding everyday since , though. Some days she just experiences spotting, enough to use a panty liner and other days the bleeding is heavy enough to use a tampon. She denies any bleeding today. Denies any cramping    Denies any recent illness, fever or pain. No pain with sexual intercourse.    Periods had been normal prior to her miscarriage. She does desire pregnancy     PLAN:  Discussed current bleeding may be resumption or  an irregular menstrual cycle occurring, another miscarriage in process or bleeding because her prior miscarriage is incomplete.    Will begin with serial Hcg levels (2-3 days apart). Explained wanting to be sure her Hcg is decreasing if a miscarriage has occurred and eventually be <5, or if this is a new pregnancy it should be increasing appropriately.     Depending on Hcg level a US evaluation will be considered to determine the next steps in her plan of care.     30  minutes spent by me on the date of the encounter doing chart review, history and exam, documentation and further activities per the note    Geovanna MCGHEE CNM

## 2024-01-25 ENCOUNTER — OFFICE VISIT (OUTPATIENT)
Dept: MIDWIFE SERVICES | Facility: CLINIC | Age: 39
End: 2024-01-25
Payer: COMMERCIAL

## 2024-01-25 VITALS
SYSTOLIC BLOOD PRESSURE: 106 MMHG | DIASTOLIC BLOOD PRESSURE: 64 MMHG | HEIGHT: 67 IN | WEIGHT: 179.4 LBS | BODY MASS INDEX: 28.16 KG/M2

## 2024-01-25 DIAGNOSIS — Z87.59 MISCARRIAGE WITHIN LAST 12 MONTHS: Primary | ICD-10-CM

## 2024-01-25 PROCEDURE — 36415 COLL VENOUS BLD VENIPUNCTURE: CPT | Performed by: ADVANCED PRACTICE MIDWIFE

## 2024-01-25 PROCEDURE — 84702 CHORIONIC GONADOTROPIN TEST: CPT | Performed by: ADVANCED PRACTICE MIDWIFE

## 2024-01-25 PROCEDURE — 99214 OFFICE O/P EST MOD 30 MIN: CPT | Performed by: ADVANCED PRACTICE MIDWIFE

## 2024-01-25 ASSESSMENT — ANXIETY QUESTIONNAIRES
7. FEELING AFRAID AS IF SOMETHING AWFUL MIGHT HAPPEN: NOT AT ALL
IF YOU CHECKED OFF ANY PROBLEMS ON THIS QUESTIONNAIRE, HOW DIFFICULT HAVE THESE PROBLEMS MADE IT FOR YOU TO DO YOUR WORK, TAKE CARE OF THINGS AT HOME, OR GET ALONG WITH OTHER PEOPLE: NOT DIFFICULT AT ALL
2. NOT BEING ABLE TO STOP OR CONTROL WORRYING: NOT AT ALL
GAD7 TOTAL SCORE: 1
5. BEING SO RESTLESS THAT IT IS HARD TO SIT STILL: NOT AT ALL
1. FEELING NERVOUS, ANXIOUS, OR ON EDGE: SEVERAL DAYS
6. BECOMING EASILY ANNOYED OR IRRITABLE: NOT AT ALL
3. WORRYING TOO MUCH ABOUT DIFFERENT THINGS: NOT AT ALL
GAD7 TOTAL SCORE: 1

## 2024-01-25 ASSESSMENT — PATIENT HEALTH QUESTIONNAIRE - PHQ9
5. POOR APPETITE OR OVEREATING: NOT AT ALL
SUM OF ALL RESPONSES TO PHQ QUESTIONS 1-9: 3

## 2024-01-26 LAB — HCG INTACT+B SERPL-ACNC: 257 MIU/ML

## 2024-01-27 ENCOUNTER — LAB (OUTPATIENT)
Dept: LAB | Facility: CLINIC | Age: 39
End: 2024-01-27
Payer: COMMERCIAL

## 2024-01-27 DIAGNOSIS — O02.1 MISSED ABORTION: Primary | ICD-10-CM

## 2024-01-27 DIAGNOSIS — Z87.59 MISCARRIAGE WITHIN LAST 12 MONTHS: ICD-10-CM

## 2024-01-27 LAB — HCG INTACT+B SERPL-ACNC: 197 MIU/ML

## 2024-01-27 PROCEDURE — 84702 CHORIONIC GONADOTROPIN TEST: CPT

## 2024-01-27 PROCEDURE — 36415 COLL VENOUS BLD VENIPUNCTURE: CPT

## 2024-01-28 LAB
ABO/RH(D): NORMAL
ANTIBODY SCREEN: NEGATIVE
SPECIMEN EXPIRATION DATE: NORMAL

## 2024-01-29 ENCOUNTER — OFFICE VISIT (OUTPATIENT)
Dept: OBGYN | Facility: CLINIC | Age: 39
End: 2024-01-29
Payer: COMMERCIAL

## 2024-01-29 VITALS — SYSTOLIC BLOOD PRESSURE: 108 MMHG | WEIGHT: 179.4 LBS | BODY MASS INDEX: 28.1 KG/M2 | DIASTOLIC BLOOD PRESSURE: 64 MMHG

## 2024-01-29 DIAGNOSIS — O02.1 MISSED ABORTION: ICD-10-CM

## 2024-01-29 DIAGNOSIS — R35.0 URINARY FREQUENCY: Primary | ICD-10-CM

## 2024-01-29 DIAGNOSIS — Z87.59 MISCARRIAGE WITHIN LAST 12 MONTHS: ICD-10-CM

## 2024-01-29 PROBLEM — Z30.011 OCP (ORAL CONTRACEPTIVE PILLS) INITIATION: Status: RESOLVED | Noted: 2022-10-06 | Resolved: 2024-01-29

## 2024-01-29 LAB
ALBUMIN UR-MCNC: NEGATIVE MG/DL
APPEARANCE UR: CLEAR
BACTERIA #/AREA URNS HPF: ABNORMAL /HPF
BILIRUB UR QL STRIP: NEGATIVE
COLOR UR AUTO: YELLOW
ERYTHROCYTE [DISTWIDTH] IN BLOOD BY AUTOMATED COUNT: 13 % (ref 10–15)
GLUCOSE UR STRIP-MCNC: NEGATIVE MG/DL
HCT VFR BLD AUTO: 40.9 % (ref 35–47)
HGB BLD-MCNC: 13.3 G/DL (ref 11.7–15.7)
HGB UR QL STRIP: ABNORMAL
KETONES UR STRIP-MCNC: 15 MG/DL
LEUKOCYTE ESTERASE UR QL STRIP: NEGATIVE
MCH RBC QN AUTO: 31.9 PG (ref 26.5–33)
MCHC RBC AUTO-ENTMCNC: 32.5 G/DL (ref 31.5–36.5)
MCV RBC AUTO: 98 FL (ref 78–100)
NITRATE UR QL: NEGATIVE
PH UR STRIP: 6.5 [PH] (ref 5–7)
PLATELET # BLD AUTO: 296 10E3/UL (ref 150–450)
RBC # BLD AUTO: 4.17 10E6/UL (ref 3.8–5.2)
RBC #/AREA URNS AUTO: ABNORMAL /HPF
SP GR UR STRIP: 1.01 (ref 1–1.03)
SQUAMOUS #/AREA URNS AUTO: ABNORMAL /LPF
UROBILINOGEN UR STRIP-ACNC: 0.2 E.U./DL
WBC # BLD AUTO: 6.9 10E3/UL (ref 4–11)
WBC #/AREA URNS AUTO: ABNORMAL /HPF

## 2024-01-29 PROCEDURE — 86850 RBC ANTIBODY SCREEN: CPT | Performed by: NURSE PRACTITIONER

## 2024-01-29 PROCEDURE — 86900 BLOOD TYPING SEROLOGIC ABO: CPT | Performed by: NURSE PRACTITIONER

## 2024-01-29 PROCEDURE — 36415 COLL VENOUS BLD VENIPUNCTURE: CPT | Performed by: NURSE PRACTITIONER

## 2024-01-29 PROCEDURE — 86901 BLOOD TYPING SEROLOGIC RH(D): CPT | Performed by: NURSE PRACTITIONER

## 2024-01-29 PROCEDURE — 85027 COMPLETE CBC AUTOMATED: CPT | Performed by: NURSE PRACTITIONER

## 2024-01-29 PROCEDURE — 84702 CHORIONIC GONADOTROPIN TEST: CPT | Performed by: NURSE PRACTITIONER

## 2024-01-29 PROCEDURE — 81001 URINALYSIS AUTO W/SCOPE: CPT | Performed by: NURSE PRACTITIONER

## 2024-01-29 PROCEDURE — 99214 OFFICE O/P EST MOD 30 MIN: CPT | Performed by: NURSE PRACTITIONER

## 2024-01-29 RX ORDER — NITROFURANTOIN 25; 75 MG/1; MG/1
100 CAPSULE ORAL 2 TIMES DAILY
Qty: 14 CAPSULE | Refills: 0 | Status: SHIPPED | OUTPATIENT
Start: 2024-01-29 | End: 2024-09-17

## 2024-01-29 NOTE — PROGRESS NOTES
[facility]  OB/GYN Clinic Note    Bridgett Brady is a 38 year old female who is being evaluated via a billable telephone visit.      What phone number would you like to be contacted at? [phone]  How would you like to obtain your AVS? MyChart    Originating Location (pt. Location): home      Distant Location (provider location):  On-site      SUBJECTIVE:                                                   Bridgett Brady is a 38 year old  female who presents for virtual visit today for the following health issue(s):  Patient presents with:  Follow Up: Beta's    Additional information: US follow up     HPI:  Patient seen today for persistently elevated but declining bHCG after suspected complete ab.   LMP prior to pregnancy was . Had regular monthly periods. Positive pregnancy test in November then had bleeding , which has not stopped and now is spotting. Denies pain, fevers, cramping.   Has had unprotected intercourse.     Patient's last menstrual period was 2024..   Patient is sexually active, .  Using none for contraception.    reports that she has never smoked. She has never used smokeless tobacco.  Health maintenance updated:  yes      OBJECTIVE:     No vitals were obtained today due to virtual visit.    Physical Exam  GENERAL: alert and no distress    US OB <14 WEEKS WITH TRANSVAGINAL SINGLE 2024    Narrative  Table formatting from the original result was not included.    Obstetrical Ultrasound Report  OB U/S  < 14 Weeks -  Transvaginal  [facility]  Referring Provider: Cristina Farfan MD  Sonographer: Maira Benson RDMS  Indication:  F/U miscarriage    Dating (mm/dd/yyyy):  LMP: Bled from 24-24    Current Scan On:  2024  The calculation of the gestational age by current scan was based on CRL.  Anatomy Scan:  Boo gestation.  Biometry:  CRL                       Not seen  Yolk Sac                               Not seen  Mean gestational sac   1.08cm             5w6d    Fetal heart activity: N/A  Findings: Gestational sac vs fluid collection in uterine cavity, no fetal pole or yolk sac seen.    Maternal Structures:  Cervix: The cervix appears long and closed.  Right Ovary: Wnl  Left Ovary: Wnl  Technique:Transvaginal Imaging performed    Impression:  Small gestational sac vs fluid collection seen in uterine cavity, measures 5w6d on today's ultrasound. No fetal pole or yolk sac seen. Clinical correlation is recommended.    Cristina Farfan MD, [facility]  24        ASSESSMENT/PLAN:                                                      Phone call duration: 14 minutes      ICD-10-CM    1. Incomplete  without complications  O03.4 Case Request: Dilation and curettage of the uterus using suction under ultrasound guidance     Case Request: Dilation and curettage of the uterus using suction under ultrasound guidance          Bridgett Brady is a 38 year old  who is s/p suspected complete  with persistently elevated but declining bHCG. US today showing possible GS without FP or YS. By LMP, GA should be 14w1d. Had symptoms of complete ab at the end of November and new onset bleeding which has persisted since . Due to persistent GS and declining bHCG, likely this is incomplete ab. Reviewed management options. Would not recommend expectant management due to long duration since LMP without spontaneous passage of tissue. Discussed medical management with mife+miso vs surgical mangement with D&C. Reviewed risks, benefits, alternatives of each. After discussion, patient has elected to proceed with D&C. Reviewed duration, expected recovery of procedure. Recommend waiting until next normal menses to begin trying for pregnancy. Recommend contraception or avoiding sexual activity until this time. Orders placed with plan to proceed . H&P to be updated day of surgery.       Cristina Farfan MD,  DRE  [facility] [place]  01/30/24

## 2024-01-29 NOTE — PROGRESS NOTES
SUBJECTIVE:                                                   Bridgett Brady is a 38 year old female who presents to clinic today for the following health issue(s):  Patient presents with:  Follow Up      HPI:  38-year-old female with concerns of urinary urgency and frequency since last week.  She has also had a little bit of vaginal itching.  She took an over-the-counter Monistat treatment for the last 3 days and it has not made a difference for her.    She unfortunately had a miscarriage in December and is following beta quant's.  She does need 1 today.  She has an ultrasound scheduled for tomorrow to see Dr. Farfan.    Patient's last menstrual period was 2024..     Patient is sexually active, .  Using none for contraception.    reports that she has never smoked. She has never used smokeless tobacco.    STD testing offered?  Declined    Health maintenance updated:  yes    Today's PHQ-2 Score:       2024     9:25 AM   PHQ-2 (  [employer])   Q1: Little interest or pleasure in doing things 0   Q2: Feeling down, depressed or hopeless 0   PHQ-2 Score 0   Q1: Little interest or pleasure in doing things Not at all   Q2: Feeling down, depressed or hopeless Not at all   PHQ-2 Score 0     Today's PHQ-9 Score:       2024     1:47 PM   PHQ-9 SCORE   PHQ-9 Total Score 3     Today's BREANNE-7 Score:       2024     1:47 PM   BREANNE-7 SCORE   Total Score 1       Problem list and histories reviewed & adjusted, as indicated.  Additional history: as documented.    Patient Active Problem List   Diagnosis    Anxiety associated with depression    Insomnia    Screening for malignant neoplasm of cervix    Status post  delivery     Past Surgical History:   Procedure Laterality Date     SECTION N/A 2019    Procedure:  SECTION;  Surgeon: Brandee Jimenez MD;  Location:  [facility]     SECTION       SECTION N/A 2021    Procedure: REPEAT  SECTION;  Surgeon: Tony  Brandee Shin MD;  Location:  [facility]    HEAD & NECK SURGERY      wisdom teeth extraction      Social History     Tobacco Use    Smoking status: Never    Smokeless tobacco: Never   Substance Use Topics    Alcohol use: Not Currently      Problem (# of Occurrences) Relation (Name,Age of Onset)    Hypertension (2) Maternal Grandmother, Maternal Grandfather    Breast Cancer (1) Maternal Aunt    Hyperlipidemia (2) Maternal Grandmother, Maternal Grandfather    Coronary Artery Disease (2) Maternal Grandfather, Paternal Grandmother              Current Outpatient Medications   Medication Sig    nitroFURantoin macrocrystal-monohydrate (MACROBID) 100 MG capsule Take 1 capsule (100 mg) by mouth 2 times daily     No current facility-administered medications for this visit.     Allergies   Allergen Reactions    Amoxicillin      Reaction as a child    Penicillins Unknown     As an infant   Reaction as a child    As an infant, confirmed at age 37       ROS:  12 point review of systems negative other than symptoms noted below or in the HPI.  POSITIVE for:, urinary frequency, urgency      OBJECTIVE:     /64   Wt 81.4 kg (179 lb 6.4 oz)   LMP 01/04/2024   BMI 28.10 kg/m    Body mass index is 28.1 kg/m .    Exam:  Constitutional:  Appearance: Well nourished, well developed alert, in no acute distress  Psychiatric:  Mentation appears normal and affect normal/bright.  Pelvic Exam:  External Genitalia:     Normal appearance for age, no discharge present, no tenderness present, no inflammatory lesions present, color normal  Vagina:     Normal vaginal vault without central or paravaginal defects, no discharge present, no inflammatory lesions present, no masses present  Bladder:     Tender to palpation  Urethra:   Urethral Body:  Urethra palpation normal, urethra structural support normal   Urethral Meatus:  No erythema or lesions present  Cervix:     Appearance healthy, no lesions present, nontender to palpation, no bleeding  present  Uterus:     Uterus: firm, normal sized and nontender, anteverted in position.   Adnexa:     No adnexal tenderness present, no adnexal masses present  Perineum:     Perineum within normal limits, no evidence of trauma, no rashes or skin lesions present  Anus:     Anus within normal limits, no hemorrhoids present  Inguinal Lymph Nodes:     No lymphadenopathy present  Pubic Hair:     Normal pubic hair distribution for age  Genitalia and Groin:     No rashes present, no lesions present, no areas of discoloration, no masses present     In-Clinic Test Results:  Results for orders placed or performed in visit on 01/29/24 (from the past 24 hour(s))   UA Macroscopic with reflex to Microscopic and Culture - Lab Collect    Specimen: Urine, Midstream   Result Value Ref Range    Color Urine Yellow Colorless, Straw, Light Yellow, Yellow    Appearance Urine Clear Clear    Glucose Urine Negative Negative mg/dL    Bilirubin Urine Negative Negative    Ketones Urine 15 (A) Negative mg/dL    Specific Gravity Urine 1.015 1.003 - 1.035    Blood Urine Trace (A) Negative    pH Urine 6.5 5.0 - 7.0    Protein Albumin Urine Negative Negative mg/dL    Urobilinogen Urine 0.2 0.2, 1.0 E.U./dL    Nitrite Urine Negative Negative    Leukocyte Esterase Urine Negative Negative   UA Microscopic with Reflex to Culture   Result Value Ref Range    Bacteria Urine Few (A) None Seen /HPF    RBC Urine 5-10 (A) 0-2 /HPF /HPF    WBC Urine 0-5 0-5 /HPF /HPF    Squamous Epithelials Urine Few (A) None Seen /LPF    Narrative    Urine Culture not indicated   ABO/Rh type and screen    Narrative    The following orders were created for panel order ABO/Rh type and screen.  Procedure                               Abnormality         Status                     ---------                               -----------         ------                     Adult Type and Screen[[id number]]                                                         Please view results for  these tests on the individual orders.       ASSESSMENT/PLAN:                                                        ICD-10-CM    1. Urinary frequency  R35.0 UA Macroscopic with reflex to Microscopic and Culture - Lab Collect     UA Macroscopic with reflex to Microscopic and Culture - Lab Collect     UA Microscopic with Reflex to Culture     nitroFURantoin macrocrystal-monohydrate (MACROBID) 100 MG capsule     Urine Culture Aerobic Bacterial - lab collect      2. Missed   O02.1 ABO/Rh type and screen     CBC with platelets      3. Miscarriage within last 12 months  Z87.59 HCG quantitative pregnancy          There are no Patient Instructions on file for this visit.    38-year-old female with tenderness across the base the bladder.  We will treat her for a presumed UTI.  Her urinalysis shows trace blood and ketones.  No leukocyte Estrace.  Will send her urine culture.  Will follow-up with her lab work she understands she has a follow-up ultrasound tomorrow. Push fluids, avoid IC.     Maddison Jerry, LITZY CNP  [facility] [place]

## 2024-01-30 ENCOUNTER — ANCILLARY PROCEDURE (OUTPATIENT)
Dept: ULTRASOUND IMAGING | Facility: CLINIC | Age: 39
End: 2024-01-30
Attending: ADVANCED PRACTICE MIDWIFE
Payer: COMMERCIAL

## 2024-01-30 ENCOUNTER — VIRTUAL VISIT (OUTPATIENT)
Dept: OBGYN | Facility: CLINIC | Age: 39
End: 2024-01-30
Attending: ADVANCED PRACTICE MIDWIFE
Payer: COMMERCIAL

## 2024-01-30 DIAGNOSIS — O03.4 INCOMPLETE ABORTION WITHOUT COMPLICATIONS: Primary | ICD-10-CM

## 2024-01-30 DIAGNOSIS — O02.1 MISSED ABORTION: ICD-10-CM

## 2024-01-30 LAB — HCG INTACT+B SERPL-ACNC: 170 MIU/ML

## 2024-01-30 PROCEDURE — 76817 TRANSVAGINAL US OBSTETRIC: CPT | Performed by: STUDENT IN AN ORGANIZED HEALTH CARE EDUCATION/TRAINING PROGRAM

## 2024-01-30 PROCEDURE — 99442 PR PHYSICIAN TELEPHONE EVALUATION 11-20 MIN: CPT | Mod: 93 | Performed by: STUDENT IN AN ORGANIZED HEALTH CARE EDUCATION/TRAINING PROGRAM

## 2024-01-31 ENCOUNTER — TELEPHONE (OUTPATIENT)
Dept: OBGYN | Facility: CLINIC | Age: 39
End: 2024-01-31
Payer: COMMERCIAL

## 2024-01-31 NOTE — TELEPHONE ENCOUNTER
ERAS BAG GIVEN N/A  ERAS INSTRUCTIONS EXPLAINED N/A    ASSIST: None    H&P TO BE COMPLETED BY:   Surgeon  FOR H&P TO BE DONE BY SURGEON   UPDATE VISIT ON DATE AT HOSPITAL  SAME DAY/OBSERVATION/INPATIENT: STRAIGHT SAME DAY  EQUIPMENT: Ultrasound  VENDOR NEEDED AT CASE: none  IF IUD WHAT BRAND None  LABS/SPECIAL INSTRUCTIONS: None  TIME OFF WORK: 3 days  ESTIMATED DOCTOR TIME IN MINUTES 30 minutes  POST OP TO BE SCHEDULED Not needed

## 2024-01-31 NOTE — TELEPHONE ENCOUNTER
Type of surgery: SUCTION D&C w/US GUIDANCE  Location of surgery: [facility]  Date and time of surgery: 2/8/2024 12:05p  Surgeon: RUMA  Pre-Op Appt Date: HOSPITAL  Post-Op Appt Date: NA   Packet sent out:  EMAILED 1/31/2024  Pre-cert/Authorization completed:   TBD  Date: 1/31/2024 Mikaela via TIAGO Martinez  Surgery Scheduler

## 2024-02-08 ENCOUNTER — HOSPITAL ENCOUNTER (OUTPATIENT)
Facility: CLINIC | Age: 39
Discharge: HOME | End: 2024-02-08
Attending: STUDENT IN AN ORGANIZED HEALTH CARE EDUCATION/TRAINING PROGRAM | Admitting: STUDENT IN AN ORGANIZED HEALTH CARE EDUCATION/TRAINING PROGRAM
Payer: COMMERCIAL

## 2024-02-08 ENCOUNTER — HOSPITAL ENCOUNTER (OUTPATIENT)
Dept: ULTRASOUND IMAGING | Facility: CLINIC | Age: 39
Discharge: HOME | End: 2024-02-08
Attending: STUDENT IN AN ORGANIZED HEALTH CARE EDUCATION/TRAINING PROGRAM | Admitting: STUDENT IN AN ORGANIZED HEALTH CARE EDUCATION/TRAINING PROGRAM
Payer: COMMERCIAL

## 2024-02-08 ENCOUNTER — ANESTHESIA EVENT (OUTPATIENT)
Dept: SURGERY | Facility: CLINIC | Age: 39
End: 2024-02-08
Payer: COMMERCIAL

## 2024-02-08 ENCOUNTER — ANESTHESIA (OUTPATIENT)
Dept: SURGERY | Facility: CLINIC | Age: 39
End: 2024-02-08
Payer: COMMERCIAL

## 2024-02-08 VITALS
HEIGHT: 67 IN | RESPIRATION RATE: 16 BRPM | SYSTOLIC BLOOD PRESSURE: 114 MMHG | TEMPERATURE: 98.1 F | HEART RATE: 75 BPM | OXYGEN SATURATION: 97 % | DIASTOLIC BLOOD PRESSURE: 82 MMHG | BODY MASS INDEX: 28.08 KG/M2 | WEIGHT: 178.9 LBS

## 2024-02-08 DIAGNOSIS — O03.4 INCOMPLETE ABORTION: Primary | ICD-10-CM

## 2024-02-08 DIAGNOSIS — O03.4: ICD-10-CM

## 2024-02-08 LAB
ABO/RH(D): NORMAL
ANTIBODY SCREEN: NEGATIVE
BASOPHILS # BLD AUTO: 0 10E3/UL (ref 0–0.2)
BASOPHILS NFR BLD AUTO: 1 %
EOSINOPHIL # BLD AUTO: 0.1 10E3/UL (ref 0–0.7)
EOSINOPHIL NFR BLD AUTO: 2 %
ERYTHROCYTE [DISTWIDTH] IN BLOOD BY AUTOMATED COUNT: 12.8 % (ref 10–15)
HCT VFR BLD AUTO: 37.2 % (ref 35–47)
HGB BLD-MCNC: 12.3 G/DL (ref 11.7–15.7)
IMM GRANULOCYTES # BLD: 0 10E3/UL
IMM GRANULOCYTES NFR BLD: 0 %
LYMPHOCYTES # BLD AUTO: 2.1 10E3/UL (ref 0.8–5.3)
LYMPHOCYTES NFR BLD AUTO: 28 %
MCH RBC QN AUTO: 31.9 PG (ref 26.5–33)
MCHC RBC AUTO-ENTMCNC: 33.1 G/DL (ref 31.5–36.5)
MCV RBC AUTO: 96 FL (ref 78–100)
MONOCYTES # BLD AUTO: 0.5 10E3/UL (ref 0–1.3)
MONOCYTES NFR BLD AUTO: 7 %
NEUTROPHILS # BLD AUTO: 4.6 10E3/UL (ref 1.6–8.3)
NEUTROPHILS NFR BLD AUTO: 62 %
NRBC # BLD AUTO: 0 10E3/UL
NRBC BLD AUTO-RTO: 0 /100
PLATELET # BLD AUTO: 227 10E3/UL (ref 150–450)
RBC # BLD AUTO: 3.86 10E6/UL (ref 3.8–5.2)
SPECIMEN EXPIRATION DATE: NORMAL
WBC # BLD AUTO: 7.4 10E3/UL (ref 4–11)

## 2024-02-08 PROCEDURE — 36415 COLL VENOUS BLD VENIPUNCTURE: CPT | Performed by: STUDENT IN AN ORGANIZED HEALTH CARE EDUCATION/TRAINING PROGRAM

## 2024-02-08 PROCEDURE — 250N000025 HC SEVOFLURANE, PER MIN: Performed by: STUDENT IN AN ORGANIZED HEALTH CARE EDUCATION/TRAINING PROGRAM

## 2024-02-08 PROCEDURE — 250N000013 HC RX MED GY IP 250 OP 250 PS 637: Performed by: STUDENT IN AN ORGANIZED HEALTH CARE EDUCATION/TRAINING PROGRAM

## 2024-02-08 PROCEDURE — 999N000063 US INTRAOPERATIVE: Mod: TC

## 2024-02-08 PROCEDURE — 59812 TREATMENT OF MISCARRIAGE: CPT | Performed by: STUDENT IN AN ORGANIZED HEALTH CARE EDUCATION/TRAINING PROGRAM

## 2024-02-08 PROCEDURE — 710N000009 HC RECOVERY PHASE 1, LEVEL 1, PER MIN: Performed by: STUDENT IN AN ORGANIZED HEALTH CARE EDUCATION/TRAINING PROGRAM

## 2024-02-08 PROCEDURE — 250N000009 HC RX 250: Performed by: STUDENT IN AN ORGANIZED HEALTH CARE EDUCATION/TRAINING PROGRAM

## 2024-02-08 PROCEDURE — 710N000012 HC RECOVERY PHASE 2, PER MINUTE: Performed by: STUDENT IN AN ORGANIZED HEALTH CARE EDUCATION/TRAINING PROGRAM

## 2024-02-08 PROCEDURE — 999N000141 HC STATISTIC PRE-PROCEDURE NURSING ASSESSMENT: Performed by: STUDENT IN AN ORGANIZED HEALTH CARE EDUCATION/TRAINING PROGRAM

## 2024-02-08 PROCEDURE — 258N000003 HC RX IP 258 OP 636: Performed by: STUDENT IN AN ORGANIZED HEALTH CARE EDUCATION/TRAINING PROGRAM

## 2024-02-08 PROCEDURE — 88305 TISSUE EXAM BY PATHOLOGIST: CPT | Mod: 26 | Performed by: STUDENT IN AN ORGANIZED HEALTH CARE EDUCATION/TRAINING PROGRAM

## 2024-02-08 PROCEDURE — 272N000001 HC OR GENERAL SUPPLY STERILE: Performed by: STUDENT IN AN ORGANIZED HEALTH CARE EDUCATION/TRAINING PROGRAM

## 2024-02-08 PROCEDURE — 360N000076 HC SURGERY LEVEL 3, PER MIN: Performed by: STUDENT IN AN ORGANIZED HEALTH CARE EDUCATION/TRAINING PROGRAM

## 2024-02-08 PROCEDURE — 250N000011 HC RX IP 250 OP 636: Performed by: NURSE ANESTHETIST, CERTIFIED REGISTERED

## 2024-02-08 PROCEDURE — 88305 TISSUE EXAM BY PATHOLOGIST: CPT | Mod: TC | Performed by: STUDENT IN AN ORGANIZED HEALTH CARE EDUCATION/TRAINING PROGRAM

## 2024-02-08 PROCEDURE — 86900 BLOOD TYPING SEROLOGIC ABO: CPT | Performed by: STUDENT IN AN ORGANIZED HEALTH CARE EDUCATION/TRAINING PROGRAM

## 2024-02-08 PROCEDURE — 85025 COMPLETE CBC W/AUTO DIFF WBC: CPT | Performed by: STUDENT IN AN ORGANIZED HEALTH CARE EDUCATION/TRAINING PROGRAM

## 2024-02-08 PROCEDURE — 250N000009 HC RX 250: Performed by: NURSE ANESTHETIST, CERTIFIED REGISTERED

## 2024-02-08 PROCEDURE — 76998 US GUIDE INTRAOP: CPT | Mod: 26 | Performed by: STUDENT IN AN ORGANIZED HEALTH CARE EDUCATION/TRAINING PROGRAM

## 2024-02-08 PROCEDURE — 370N000017 HC ANESTHESIA TECHNICAL FEE, PER MIN: Performed by: STUDENT IN AN ORGANIZED HEALTH CARE EDUCATION/TRAINING PROGRAM

## 2024-02-08 RX ORDER — SODIUM CHLORIDE, SODIUM LACTATE, POTASSIUM CHLORIDE, CALCIUM CHLORIDE 600; 310; 30; 20 MG/100ML; MG/100ML; MG/100ML; MG/100ML
INJECTION, SOLUTION INTRAVENOUS CONTINUOUS
Status: DISCONTINUED | OUTPATIENT
Start: 2024-02-08 | End: 2024-02-08 | Stop reason: HOSPADM

## 2024-02-08 RX ORDER — FENTANYL CITRATE 0.05 MG/ML
25 INJECTION, SOLUTION INTRAMUSCULAR; INTRAVENOUS EVERY 5 MIN PRN
Status: DISCONTINUED | OUTPATIENT
Start: 2024-02-08 | End: 2024-02-08 | Stop reason: HOSPADM

## 2024-02-08 RX ORDER — DOXYCYCLINE 100 MG/1
200 CAPSULE ORAL ONCE
Status: COMPLETED | OUTPATIENT
Start: 2024-02-08 | End: 2024-02-08

## 2024-02-08 RX ORDER — ACETAMINOPHEN 325 MG/1
975 TABLET ORAL ONCE
Status: COMPLETED | OUTPATIENT
Start: 2024-02-08 | End: 2024-02-08

## 2024-02-08 RX ORDER — DOXYCYCLINE 100 MG/10ML
INJECTION, POWDER, LYOPHILIZED, FOR SOLUTION INTRAVENOUS PRN
Status: DISCONTINUED | OUTPATIENT
Start: 2024-02-08 | End: 2024-02-08

## 2024-02-08 RX ORDER — ONDANSETRON 2 MG/ML
INJECTION INTRAMUSCULAR; INTRAVENOUS PRN
Status: DISCONTINUED | OUTPATIENT
Start: 2024-02-08 | End: 2024-02-08

## 2024-02-08 RX ORDER — PROPOFOL 10 MG/ML
INJECTION, EMULSION INTRAVENOUS PRN
Status: DISCONTINUED | OUTPATIENT
Start: 2024-02-08 | End: 2024-02-08

## 2024-02-08 RX ORDER — DEXAMETHASONE SODIUM PHOSPHATE 4 MG/ML
INJECTION, SOLUTION INTRA-ARTICULAR; INTRALESIONAL; INTRAMUSCULAR; INTRAVENOUS; SOFT TISSUE PRN
Status: DISCONTINUED | OUTPATIENT
Start: 2024-02-08 | End: 2024-02-08

## 2024-02-08 RX ORDER — LIDOCAINE HYDROCHLORIDE 20 MG/ML
INJECTION, SOLUTION INFILTRATION; PERINEURAL PRN
Status: DISCONTINUED | OUTPATIENT
Start: 2024-02-08 | End: 2024-02-08

## 2024-02-08 RX ORDER — HYDROMORPHONE HCL IN WATER/PF 6 MG/30 ML
0.4 PATIENT CONTROLLED ANALGESIA SYRINGE INTRAVENOUS EVERY 5 MIN PRN
Status: DISCONTINUED | OUTPATIENT
Start: 2024-02-08 | End: 2024-02-08 | Stop reason: HOSPADM

## 2024-02-08 RX ORDER — IBUPROFEN 400 MG/1
800 TABLET, FILM COATED ORAL ONCE
Status: DISCONTINUED | OUTPATIENT
Start: 2024-02-08 | End: 2024-02-08 | Stop reason: HOSPADM

## 2024-02-08 RX ORDER — MAGNESIUM HYDROXIDE 1200 MG/15ML
LIQUID ORAL PRN
Status: DISCONTINUED | OUTPATIENT
Start: 2024-02-08 | End: 2024-02-08 | Stop reason: HOSPADM

## 2024-02-08 RX ORDER — PROPOFOL 10 MG/ML
INJECTION, EMULSION INTRAVENOUS CONTINUOUS PRN
Status: DISCONTINUED | OUTPATIENT
Start: 2024-02-08 | End: 2024-02-08

## 2024-02-08 RX ORDER — ONDANSETRON 2 MG/ML
4 INJECTION INTRAMUSCULAR; INTRAVENOUS EVERY 30 MIN PRN
Status: DISCONTINUED | OUTPATIENT
Start: 2024-02-08 | End: 2024-02-08 | Stop reason: HOSPADM

## 2024-02-08 RX ORDER — OXYCODONE HYDROCHLORIDE 5 MG/1
5 TABLET ORAL
Status: DISCONTINUED | OUTPATIENT
Start: 2024-02-08 | End: 2024-02-08 | Stop reason: HOSPADM

## 2024-02-08 RX ORDER — KETOROLAC TROMETHAMINE 30 MG/ML
INJECTION, SOLUTION INTRAMUSCULAR; INTRAVENOUS PRN
Status: DISCONTINUED | OUTPATIENT
Start: 2024-02-08 | End: 2024-02-08

## 2024-02-08 RX ORDER — FENTANYL CITRATE 50 UG/ML
INJECTION, SOLUTION INTRAMUSCULAR; INTRAVENOUS PRN
Status: DISCONTINUED | OUTPATIENT
Start: 2024-02-08 | End: 2024-02-08

## 2024-02-08 RX ORDER — ONDANSETRON 4 MG/1
4 TABLET, ORALLY DISINTEGRATING ORAL EVERY 30 MIN PRN
Status: DISCONTINUED | OUTPATIENT
Start: 2024-02-08 | End: 2024-02-08 | Stop reason: HOSPADM

## 2024-02-08 RX ORDER — FENTANYL CITRATE 0.05 MG/ML
50 INJECTION, SOLUTION INTRAMUSCULAR; INTRAVENOUS EVERY 5 MIN PRN
Status: DISCONTINUED | OUTPATIENT
Start: 2024-02-08 | End: 2024-02-08 | Stop reason: HOSPADM

## 2024-02-08 RX ORDER — IBUPROFEN 800 MG/1
800 TABLET, FILM COATED ORAL EVERY 6 HOURS PRN
COMMUNITY
Start: 2024-02-08 | End: 2024-09-17

## 2024-02-08 RX ORDER — HYDROMORPHONE HCL IN WATER/PF 6 MG/30 ML
0.2 PATIENT CONTROLLED ANALGESIA SYRINGE INTRAVENOUS EVERY 5 MIN PRN
Status: DISCONTINUED | OUTPATIENT
Start: 2024-02-08 | End: 2024-02-08 | Stop reason: HOSPADM

## 2024-02-08 RX ORDER — ACETAMINOPHEN 325 MG/1
975 TABLET ORAL ONCE
Status: DISCONTINUED | OUTPATIENT
Start: 2024-02-08 | End: 2024-02-08 | Stop reason: HOSPADM

## 2024-02-08 RX ORDER — ACETAMINOPHEN 325 MG/1
975 TABLET ORAL EVERY 6 HOURS PRN
COMMUNITY
Start: 2024-02-08 | End: 2024-09-17

## 2024-02-08 RX ADMIN — DEXAMETHASONE SODIUM PHOSPHATE 4 MG: 4 INJECTION, SOLUTION INTRA-ARTICULAR; INTRALESIONAL; INTRAMUSCULAR; INTRAVENOUS; SOFT TISSUE at 12:39

## 2024-02-08 RX ADMIN — DOXYCYCLINE 100 MG: 100 INJECTION, POWDER, LYOPHILIZED, FOR SOLUTION INTRAVENOUS at 12:31

## 2024-02-08 RX ADMIN — LIDOCAINE HYDROCHLORIDE 40 MG: 20 INJECTION, SOLUTION INFILTRATION; PERINEURAL at 12:19

## 2024-02-08 RX ADMIN — PROPOFOL 200 MG: 10 INJECTION, EMULSION INTRAVENOUS at 12:19

## 2024-02-08 RX ADMIN — FENTANYL CITRATE 100 MCG: 50 INJECTION INTRAMUSCULAR; INTRAVENOUS at 12:19

## 2024-02-08 RX ADMIN — PROPOFOL 75 MCG/KG/MIN: 10 INJECTION, EMULSION INTRAVENOUS at 12:29

## 2024-02-08 RX ADMIN — SODIUM CHLORIDE, POTASSIUM CHLORIDE, SODIUM LACTATE AND CALCIUM CHLORIDE: 600; 310; 30; 20 INJECTION, SOLUTION INTRAVENOUS at 12:12

## 2024-02-08 RX ADMIN — SUCCINYLCHOLINE CHLORIDE 140 MG: 20 INJECTION, SOLUTION INTRAMUSCULAR; INTRAVENOUS; PARENTERAL at 12:19

## 2024-02-08 RX ADMIN — ONDANSETRON 4 MG: 2 INJECTION INTRAMUSCULAR; INTRAVENOUS at 12:39

## 2024-02-08 RX ADMIN — KETOROLAC TROMETHAMINE 30 MG: 30 INJECTION, SOLUTION INTRAMUSCULAR at 12:40

## 2024-02-08 RX ADMIN — MIDAZOLAM 2 MG: 1 INJECTION INTRAMUSCULAR; INTRAVENOUS at 12:14

## 2024-02-08 RX ADMIN — ACETAMINOPHEN 975 MG: 325 TABLET, FILM COATED ORAL at 10:34

## 2024-02-08 RX ADMIN — DOXYCYCLINE HYCLATE 200 MG: 100 CAPSULE ORAL at 10:34

## 2024-02-08 ASSESSMENT — ACTIVITIES OF DAILY LIVING (ADL)
ADLS_ACUITY_SCORE: 35
ADLS_ACUITY_SCORE: 35

## 2024-02-08 NOTE — ANESTHESIA PROCEDURE NOTES
Airway         Procedure Start/Stop Times: 2/8/2024 12:20 PM  Staff -        Anesthesiologist:  Ryan Shipman MD       CRNA: Geovany Monique APRN CRNA       Performed By: CRNA  Consent for Airway        Urgency: elective  Indications and Patient Condition       Indications for airway management: shiloh-procedural       Induction type:RSI       Mask difficulty assessment: 0 - not attempted    Final Airway Details       Final airway type: endotracheal airway       Successful airway: ETT - single  Endotracheal Airway Details        ETT size (mm): 7.0       Cuffed: yes       Successful intubation technique: video laryngoscopy       VL Blade Size: Glidescope 3       Grade View of Cords: 1       Adjucts: stylet       Position: Left       Measured from: lips       Secured at (cm): 21       Bite block used: None    Post intubation assessment        Placement verified by: capnometry, equal breath sounds and chest rise        Number of attempts at approach: 1       Number of other approaches attempted: 0       Secured with: tape       Ease of procedure: easy       Dentition: Intact and Unchanged    Medication(s) Administered   Medication Administration Time: 2/8/2024 12:20 PM

## 2024-02-08 NOTE — H&P
[facility]  OB/GYN Clinic Note    Bridgett Brady is a 38 year old female who is being evaluated for D&C.       SUBJECTIVE:                                                   Bridgett Brady is a 38 year old  female who presents for D&C.       HPI:  Patient seen for persistently elevated but declining bHCG after suspected complete ab.   LMP prior to pregnancy was . Had regular monthly periods. Positive pregnancy test in November then had bleeding , which has not stopped and now is spotting. Denies pain, fevers, cramping.   Has had unprotected intercourse.     Patient's last menstrual period was 2024..   Patient is sexually active, .  Using none for contraception.    reports that she has never smoked. She has never used smokeless tobacco.  Health maintenance updated:  yes    Past Medical History:   Diagnosis Date    Anxiety     Depressive disorder     no meds    Insomnia     Motion sickness      Past Surgical History:   Procedure Laterality Date     SECTION N/A 2019    Procedure:  SECTION;  Surgeon: Brandee Jimenez MD;  Location:  [facility]     SECTION       SECTION N/A 2021    Procedure: REPEAT  SECTION;  Surgeon: Brandee Jimenez MD;  Location:  [facility]    HEAD & NECK SURGERY      wisdom teeth extraction     Current Facility-Administered Medications   Medication    fentaNYL (PF) (SUBLIMAZE) injection 25 mcg    fentaNYL (PF) (SUBLIMAZE) injection 50 mcg    HYDROmorphone (DILAUDID) injection 0.2 mg    HYDROmorphone (DILAUDID) injection 0.4 mg    lactated ringers infusion    ondansetron (ZOFRAN ODT) ODT tab 4 mg    Or    ondansetron (ZOFRAN) injection 4 mg    prochlorperazine (COMPAZINE) injection 5 mg        Allergies   Allergen Reactions    Amoxicillin      Reaction as a child    Penicillins Unknown     As an infant   Reaction as a child    As an infant, confirmed at age 37     Family History   Problem Relation  "Age of Onset    Hypertension Maternal Grandmother     Hyperlipidemia Maternal Grandmother     Coronary Artery Disease Maternal Grandfather     Hypertension Maternal Grandfather     Hyperlipidemia Maternal Grandfather     Coronary Artery Disease Paternal Grandmother     Breast Cancer Maternal Aunt      Social History     Socioeconomic History    Marital status:      Spouse name: Eddie    Number of children: 2    Years of education: Not on file    Highest education level: Not on file   Occupational History    Not on file   Tobacco Use    Smoking status: Never    Smokeless tobacco: Never   Substance and Sexual Activity    Alcohol use: Not Currently    Drug use: Never    Sexual activity: Yes     Partners: Male   Other Topics Concern    Parent/sibling w/ CABG, MI or angioplasty before 65F 55M? Not Asked   Social History Narrative    Not on file     Social Determinants of Health     Financial Resource Strain: Not on file   Food Insecurity: Not on file   Transportation Needs: Not on file   Physical Activity: Not on file   Stress: Not on file   Social Connections: Not on file   Interpersonal Safety: Not on file   Housing Stability: Not on file           OBJECTIVE:     Vitals:    02/08/24 1048   BP: 107/49   Resp: 16   Temp: 98  F (36.7  C)   TempSrc: Temporal   SpO2: 98%   Weight: 81.1 kg (178 lb 14.4 oz)   Height: 1.702 m (5' 7\")         Physical Exam  Constitutional:       Appearance: Normal appearance.   HENT:      Head: Normocephalic.   Cardiovascular:      Rate and Rhythm: Normal rate and regular rhythm.   Pulmonary:      Effort: Pulmonary effort is normal.      Breath sounds: Normal breath sounds.   Abdominal:      Palpations: Abdomen is soft.   Musculoskeletal:      Cervical back: Normal range of motion.   Neurological:      Mental Status: She is alert.       GENERAL: alert and no distress  Physical Exam  Constitutional:       Appearance: Normal appearance.   HENT:      Head: Normocephalic.   Cardiovascular:    "   Rate and Rhythm: Normal rate and regular rhythm.   Pulmonary:      Effort: Pulmonary effort is normal.      Breath sounds: Normal breath sounds.   Abdominal:      Palpations: Abdomen is soft.   Musculoskeletal:      Cervical back: Normal range of motion.   Neurological:      Mental Status: She is alert.           US OB <14 WEEKS WITH TRANSVAGINAL SINGLE 2024    Narrative  Table formatting from the original result was not included.    Obstetrical Ultrasound Report  OB U/S  < 14 Weeks -  Transvaginal  [facility]  Referring Provider: Cristina Farfan MD  Sonographer: Maira Benson RDMS  Indication:  F/U miscarriage    Dating (mm/dd/yyyy):  LMP: Bled from 24-24    Current Scan On:  2024  The calculation of the gestational age by current scan was based on CRL.  Anatomy Scan:  Boo gestation.  Biometry:  CRL                       Not seen  Yolk Sac                              Not seen  Mean gestational sac   1.08cm             5w6d    Fetal heart activity: N/A  Findings: Gestational sac vs fluid collection in uterine cavity, no fetal pole or yolk sac seen.    Maternal Structures:  Cervix: The cervix appears long and closed.  Right Ovary: Wnl  Left Ovary: Wnl  Technique:Transvaginal Imaging performed    Impression:  Small gestational sac vs fluid collection seen in uterine cavity, measures 5w6d on today's ultrasound. No fetal pole or yolk sac seen. Clinical correlation is recommended.    Cristina Farfan MD, [facility]  24        ASSESSMENT/PLAN:                                                      Bridgett Brady is a 38 year old  who is s/p suspected complete  with persistently elevated but declining bHCG. US today showing possible GS without FP or YS. By LMP, GA should be 14w1d. Had symptoms of complete ab at the end of November and new onset bleeding which has persisted since . Due to persistent GS and declining bHCG, likely this is incomplete ab.  Reviewed management options. Would not recommend expectant management due to long duration since LMP without spontaneous passage of tissue. Discussed medical management with mife+miso vs surgical mangement with D&C. Reviewed risks, benefits, alternatives of each. After discussion, patient has elected to proceed with D&C. Reviewed duration, expected recovery of procedure. Recommend waiting until next normal menses to begin trying for pregnancy. Recommend contraception or avoiding sexual activity until this time. Orders placed with plan to proceed Thursday 2/8. Cleared to proceed with surgery today.       Cristina Farfan MD, MHS  [facility] [place]  02/08/24

## 2024-02-08 NOTE — ANESTHESIA POSTPROCEDURE EVALUATION
Patient: Bridgett Brady    Procedure: Procedure(s):  Dilation and curettage of the uterus using suction under ultrasound guidance       Anesthesia Type:  General    Note:  Disposition: Outpatient   Postop Pain Control: Uneventful            Sign Out: Well controlled pain   PONV: No   Neuro/Psych: Uneventful            Sign Out: Acceptable/Baseline neuro status   Airway/Respiratory: Uneventful            Sign Out: Acceptable/Baseline resp. status   CV/Hemodynamics: Uneventful            Sign Out: Acceptable CV status   Other NRE: NONE   DID A NON-ROUTINE EVENT OCCUR?            Last vitals:  Vitals Value Taken Time   /57 02/08/24 1345   Temp 36.7  C (98.1  F) 02/08/24 1345   Pulse 89 02/08/24 1349   Resp 9 02/08/24 1349   SpO2 99 % 02/08/24 1348   Vitals shown include unfiled device data.    Electronically Signed By: Ryan Shipman MD  February 8, 2024  2:08 PM

## 2024-02-08 NOTE — OP NOTE
Brief Operative Note   Name: Bridgett Brady  MRN:[id number]  : 1985  Date of Surgery: 2024    Pre-operative Diagnosis: Incomplete  without complications [O03.4]  Post-operative Diagnosis: Same, s/p procedure below  Procedure(s): Procedures:    * Dilation and curettage of the uterus using suction under ultrasound guidance  Surgeon: Cristina Farfan MD, S   Anesthesia: GETA  EBL: 10 mL   Specimens: * No specimens in log *  Complications: None apparent.  Findings: On EUA, normal vagina, normal cervix, closed. On bedside US, small GS at fundus.   Indications: Bridgett Brady is a 38 year old year old  who was diagnosed with incomplete . She was counseled on the risks, benefits, and alternatives of the procedure, and she consented.   Procedure: The patient was taken to the operating room where she underwent GETA without difficulty. She was placed in the dorsal lithotomy position. An examination was done with findings above.  She was prepped and draped in the usual sterile fashion. A sterile speculum was inserted into the vagina.   The cervix was serially dilated to 7mm using Hegar dilators. A 7mm suction curette was advanced gently to the uterine fundus. The suction device was activated and the curette rotated to clear the uterus of products of conception. Two more passes of the suction curettage were performed. A sharp curettage was performed with a gritty texture noted in the uterine cavity. On additional pass with suction curette was undertaken. There was minimal bleeding noted and the tenaculum removed with good hemostasis noted after application of silver nitrate to right tenaculum site. The patient tolerated the procedure well and was taken to the recovery area in stable condition.   Cristina Farfan MD, S  24

## 2024-02-08 NOTE — ANESTHESIA CARE TRANSFER NOTE
Patient: Bridgett Brady    Procedure: Procedure(s):  Dilation and curettage of the uterus using suction under ultrasound guidance       Diagnosis: Incomplete  without complications [O03.4]  Diagnosis Additional Information: No value filed.    Anesthesia Type:   General     Note:    Oropharynx: oropharynx clear of all foreign objects and spontaneously breathing  Level of Consciousness: awake  Oxygen Supplementation: face mask  Level of Supplemental Oxygen (L/min / FiO2): 6  Independent Airway: airway patency satisfactory and stable  Dentition: dentition unchanged  Vital Signs Stable: post-procedure vital signs reviewed and stable  Report to RN Given: handoff report given  Patient transferred to: PACU    Handoff Report: Identifed the Patient, Identified the Reponsible Provider, Reviewed the pertinent medical history, Discussed the surgical course, Reviewed Intra-OP anesthesia mangement and issues during anesthesia, Set expectations for post-procedure period and Allowed opportunity for questions and acknowledgement of understanding      Vitals:  Vitals Value Taken Time   BP 97/46 24 1259   Temp     Pulse 81 24 1303   Resp 17 24 1303   SpO2 95 % 24 1303   Vitals shown include unfiled device data.    Electronically Signed By: LITZY Aj CRNA  2024  1:04 PM

## 2024-02-08 NOTE — ANESTHESIA PREPROCEDURE EVALUATION
Anesthesia Pre-Procedure Evaluation    Patient: Bridgett Brady   MRN: [id number] : 1985        Procedure : Procedure(s):  Dilation and curettage of the uterus using suction under ultrasound guidance          Past Medical History:   Diagnosis Date    Anxiety     Depressive disorder     no meds    Insomnia     Motion sickness       Past Surgical History:   Procedure Laterality Date     SECTION N/A 2019    Procedure:  SECTION;  Surgeon: Brandee Jimenez MD;  Location:  [facility]D     SECTION       SECTION N/A 2021    Procedure: REPEAT  SECTION;  Surgeon: Brandee Jimenez MD;  Location:  L[facility]D    HEAD & NECK SURGERY      wisdom teeth extraction      Allergies   Allergen Reactions    Amoxicillin      Reaction as a child    Penicillins Unknown     As an infant   Reaction as a child    As an infant, confirmed at age 37      Social History     Tobacco Use    Smoking status: Never    Smokeless tobacco: Never   Substance Use Topics    Alcohol use: Not Currently      Wt Readings from Last 1 Encounters:   24 81.1 kg (178 lb 14.4 oz)        Anesthesia Evaluation   Pt has had prior anesthetic.     No history of anesthetic complications       ROS/MED HX  ENT/Pulmonary:    (-) sleep apnea   Neurologic:    (-) migraines   Cardiovascular:    (-) hypertension   METS/Exercise Tolerance: >4 METS    Hematologic:  - neg hematologic  ROS     Musculoskeletal:  - neg musculoskeletal ROS     GI/Hepatic:    (-) GERD   Renal/Genitourinary:    (-) renal disease   Endo:    (-) Type II DM and obesity   Psychiatric/Substance Use:     (+) psychiatric history anxiety and depression       Infectious Disease:  - neg infectious disease ROS     Malignancy:  - neg malignancy ROS     Other:      (+) Possibly pregnant, , ,         Physical Exam    Airway        Mallampati: II   TM distance: > 3 FB   Neck ROM: full   Mouth opening: > 3 cm    Respiratory Devices and Support         Dental      "  (+) Minor Abnormalities - some fillings, tiny chips      Cardiovascular   cardiovascular exam normal          Pulmonary   pulmonary exam normal                OUTSIDE LABS:  CBC:   Lab Results   Component Value Date    WBC 7.4 02/08/2024    WBC 6.9 01/29/2024    HGB 12.3 02/08/2024    HGB 13.3 01/29/2024    HCT 37.2 02/08/2024    HCT 40.9 01/29/2024     02/08/2024     01/29/2024     BMP:   Lab Results   Component Value Date     06/25/2019    POTASSIUM 4.1 06/25/2019    CHLORIDE 106 06/25/2019    CO2 21 06/25/2019    BUN 8 06/25/2019    CR 0.70 06/25/2019    GLC 35 (LL) 06/25/2019     COAGS: No results found for: \"PTT\", \"INR\", \"FIBR\"  POC: No results found for: \"BGM\", \"HCG\", \"HCGS\"  HEPATIC:   Lab Results   Component Value Date    ALBUMIN 3.0 (L) 06/25/2019    PROTTOTAL 6.6 (L) 06/25/2019    ALT 25 06/25/2019    AST 21 06/25/2019    ALKPHOS 130 06/25/2019    BILITOTAL 0.3 06/25/2019     OTHER:   Lab Results   Component Value Date    CAROL 8.8 06/25/2019    TSH 1.12 10/06/2022       Anesthesia Plan    ASA Status:  2       Anesthesia Type: General.     - Airway: ETT   Induction: Propofol, RSI.   Maintenance: Balanced.   Techniques and Equipment:     - Airway: Video-Laryngoscope       Consents    Anesthesia Plan(s) and associated risks, benefits, and realistic alternatives discussed. Questions answered and patient/representative(s) expressed understanding.     - Discussed:     - Discussed with:  Patient            Postoperative Care    Pain management: IV analgesics.   PONV prophylaxis: Ondansetron (or other 5HT-3), Dexamethasone or Solumedrol, Background Propofol Infusion     Comments:               Ryan Shipman MD    I have reviewed the pertinent notes and labs in the chart from the past 30 days and (re)examined the patient.  Any updates or changes from those notes are reflected in this note.              # Overweight: Estimated body mass index is 28.02 kg/m  as calculated from the " "following:    Height as of this encounter: 1.702 m (5' 7\").    Weight as of this encounter: 81.1 kg (178 lb 14.4 oz).      "

## 2024-02-08 NOTE — DISCHARGE INSTRUCTIONS
____________________________________________    Our hearts go out to you at this difficult time. Be assured that there is no right way to find comfort and support. It may take time to find out what works for you.  Please do not hesitate to ask for support from our nurses, social workers, or chaplains.  After you leave the hospital, if you need help finding support resources, please call [phone].  [facility] hosts a support group for anyone who has had a pregnancy loss providing a supportive place to learn and share. Couples are encouraged to attend together.     Where: [facility], [facility], [facility]  When: 1st and 3rd Thursday of each month, 5:00pm - 6:30pm  To register, contact:    Rashaad *[phone] [email or web address]   Gladys *[phone] [email or web address]    ______________________________________________    Today you were given 975 mg of Tylenol at 10:35am. The recommended daily maximum dose is 4000 mg.   Today you received Toradol, an antiinflammatory medication similar to Ibuprofen.  You should not take other antiinflammatory medication, such as Ibuprofen, Motrin, Advil, Aleve, Naprosyn, etc until 6:40pm.     Same Day Surgery Discharge Instructions for  Sedation and General Anesthesia     It's not unusual to feel dizzy, light-headed or faint for up to 24 hours after surgery or while taking pain medication.  If you have these symptoms: sit for a few minutes before standing and have someone assist you when you get up to walk or use the bathroom.    You should rest and relax for the next 24 hours. We recommend you make arrangements to have an adult stay with you for at least 24 hours after your discharge.  Avoid hazardous and strenuous activity.    DO NOT DRIVE any vehicle or operate mechanical equipment for 24 hours following the end of your surgery.  Even though you may feel normal, your reactions may be affected by the medication you have  received.    Do not drink alcoholic beverages for 24 hours following surgery.     Slowly progress to your regular diet as you feel able. It's not unusual to feel nauseated and/or vomit after receiving anesthesia.  If you develop these symptoms, drink clear liquids (apple juice, ginger ale, broth, 7-up, etc. ) until you feel better.  If your nausea and vomiting persists for 24 hours, please notify your surgeon.      All narcotic pain medications, along with inactivity and anesthesia, can cause constipation. Drinking plenty of liquids and increasing fiber intake will help.    For any questions of a medical nature, call your surgeon.    Do not make important decisions for 24 hours.    If you had general anesthesia, you may have a sore throat for a couple of days related to the breathing tube used during surgery.  You may use Cepacol lozenges to help with this discomfort.  If it worsens or if you develop a fever, contact your surgeon.     If you feel your pain is not well managed with the pain medications prescribed by your surgeon, please contact your surgeon's office to let them know so they can address your concerns.      **If you have questions or concerns about your procedure,   call Dr. Farfan at [phone]**

## 2024-02-08 NOTE — OR NURSING
Patient alert and oriented x 4.  VSS. Patient voided prior to discharge.  Discharge instructions read through with Patient and Family member (Eddie-spouse).  Medications given to family member.  IV removed.  Patient escorted to door in a wheelchair with staff.

## 2024-02-09 LAB
PATH REPORT.COMMENTS IMP SPEC: NORMAL
PATH REPORT.COMMENTS IMP SPEC: NORMAL
PATH REPORT.FINAL DX SPEC: NORMAL
PATH REPORT.GROSS SPEC: NORMAL
PATH REPORT.MICROSCOPIC SPEC OTHER STN: NORMAL
PATH REPORT.RELEVANT HX SPEC: NORMAL
PHOTO IMAGE: NORMAL

## 2024-02-17 ENCOUNTER — HEALTH MAINTENANCE LETTER (OUTPATIENT)
Age: 39
End: 2024-02-17

## 2024-09-16 PROBLEM — Z98.891 STATUS POST CESAREAN DELIVERY: Status: RESOLVED | Noted: 2021-07-28 | Resolved: 2024-09-16

## 2024-09-17 ENCOUNTER — OFFICE VISIT (OUTPATIENT)
Dept: OBGYN | Facility: CLINIC | Age: 39
End: 2024-09-17
Payer: COMMERCIAL

## 2024-09-17 VITALS — BODY MASS INDEX: 27.72 KG/M2 | DIASTOLIC BLOOD PRESSURE: 74 MMHG | WEIGHT: 177 LBS | SYSTOLIC BLOOD PRESSURE: 116 MMHG

## 2024-09-17 DIAGNOSIS — Z31.41 FERTILITY TESTING: Primary | ICD-10-CM

## 2024-09-17 PROCEDURE — 99213 OFFICE O/P EST LOW 20 MIN: CPT | Performed by: OBSTETRICS & GYNECOLOGY

## 2024-09-17 NOTE — PATIENT INSTRUCTIONS
Reviewed fertility testing in the setting of no pregnancy within 6 months of starting trying to conceive in women older than 35.  Will return for day #21 progesterone (Oct 2nd) and with next cycle day #3 labs.  Will also arrange semen analysis for her .  Once results are back, will reconvene to discuss results and followup/management as needed.  Continue with daily prenatal vitamin.

## 2024-09-17 NOTE — PROGRESS NOTES
SUBJECTIVE:                                                   Bridgett Brady is a 38 year old female who presents to clinic today for the following health issue(s):  Patient presents with:  Consult: Discuss TTC x 10 months, menses are regular coming every month x 4-5 days, OPK's and getting positives      HPI:  Here today to discuss fertility.  Stopped her OCP in 2023 to start TTC.  Had early miscarriage in early January but has persistently positive hcg levels into early February.  Had D&C with Dr. Farfan at that time.  Cycles resumed within the following month and have been regular since.  Monthly menses x 4-5d.  No intermenstrual bleeding/spotting.  Tracking with fertility [facility].   Did OPKs for ~3 months and positive results lining up with predicted fertility window.  +SA at least every day for the entirety of the fertile window.  No issues with performance for she or her  with sex.  Previousy has gotten pregnant within the first 1-2 months of TTC so frustrated with lack of pregnancy so far.  Also worried about age and effects on fertility  Otherwise very healthy; no changes in personal medical history since last pregnancy  No changes/issues for partner    No LMP recorded. (Menstrual status: Irregular Periods)..     Patient is sexually active, .  Using none for contraception.    reports that she has never smoked. She has never used smokeless tobacco.    STD testing offered?  Declined    Health maintenance updated:  yes    Today's PHQ-2 Score:       2024     9:25 AM   PHQ-2 (  [employer])   Q1: Little interest or pleasure in doing things 0   Q2: Feeling down, depressed or hopeless 0   PHQ-2 Score 0   Q1: Little interest or pleasure in doing things Not at all   Q2: Feeling down, depressed or hopeless Not at all   PHQ-2 Score 0           Problem list and histories reviewed & adjusted, as indicated.  Additional history: as documented.    Patient Active Problem List   Diagnosis     Anxiety associated with depression    Insomnia    Screening for malignant neoplasm of cervix     Past Surgical History:   Procedure Laterality Date     SECTION N/A 2019    Procedure:  SECTION;  Surgeon: Brandee Jimenez MD;  Location:  [facility]     SECTION N/A 2021    Procedure: REPEAT  SECTION;  Surgeon: Brandee Jiemnez MD;  Location:  [facility]    COLONOSCOPY      DILATION AND CURETTAGE SUCTION WITH ULTRASOUND GUIDANCE N/A 2024    Procedure: Dilation and curettage of the uterus using suction under ultrasound guidance;  Surgeon: Cristina Farfan MD;  Location:  OR    HEAD & NECK SURGERY      wisdom teeth extraction      Social History     Tobacco Use    Smoking status: Never    Smokeless tobacco: Never   Substance Use Topics    Alcohol use: Not Currently      Problem (# of Occurrences) Relation (Name,Age of Onset)    Hypertension (2) Maternal Grandmother, Maternal Grandfather    Breast Cancer (1) Maternal Aunt    Hyperlipidemia (2) Maternal Grandmother, Maternal Grandfather    Coronary Artery Disease (2) Maternal Grandfather, Paternal Grandmother    No Known Problems (5) Mother, Father, Sister, Brother, Paternal Grandfather              No current outpatient medications on file.     No current facility-administered medications for this visit.     Allergies   Allergen Reactions    Amoxicillin      Reaction as a child    Penicillins Unknown     As an infant   Reaction as a child    As an infant, confirmed at age 37       ROS:  12 point review of systems negative other than symptoms noted below or in the HPI.  No urinary frequency or dysuria, bladder or kidney problems, Normal menstrual cycles      OBJECTIVE:     /74   Wt 80.3 kg (177 lb)   BMI 27.72 kg/m    Body mass index is 27.72 kg/m .    Exam:  Constitutional:  Appearance: Well nourished, well developed alert, in no acute distress  Neurologic:  Mental Status:  Oriented X3.  Normal strength and tone,  sensory exam grossly normal, mentation intact and speech normal.    Psychiatric:  Mentation appears normal and affect normal/bright.     In-Clinic Test Results:  No results found for this or any previous visit (from the past 24 hour(s)).    ASSESSMENT/PLAN:                                                        ICD-10-CM    1. Fertility testing  Z31.41 Progesterone     Follicle stimulating hormone     Estradiol     TSH with free T4 reflex     Prolactin     Anti-Mullerian hormone          Patient Instructions   Reviewed fertility testing in the setting of no pregnancy within 6 months of starting trying to conceive in women older than 35.  Will return for day #21 progesterone (Oct 2nd) and with next cycle day #3 labs.  Will also arrange semen analysis for her .  Once results are back, will reconvene to discuss results and followup/management as needed.  Continue with daily prenatal vitamin.      Brandee Jimenez MD  [facility] [place]

## 2024-10-02 ENCOUNTER — LAB (OUTPATIENT)
Dept: LAB | Facility: CLINIC | Age: 39
End: 2024-10-02
Payer: COMMERCIAL

## 2024-10-02 DIAGNOSIS — Z31.41 FERTILITY TESTING: ICD-10-CM

## 2024-10-02 LAB — PROGEST SERPL-MCNC: 18 NG/ML

## 2024-10-02 PROCEDURE — 84144 ASSAY OF PROGESTERONE: CPT

## 2024-10-02 PROCEDURE — 36415 COLL VENOUS BLD VENIPUNCTURE: CPT

## 2024-10-06 NOTE — RESULT ENCOUNTER NOTE
Please inform of normal results --progesterone level appropriately elevated confirming that she ovulated this month

## 2024-10-09 ENCOUNTER — TRANSFERRED RECORDS (OUTPATIENT)
Dept: HEALTH INFORMATION MANAGEMENT | Facility: CLINIC | Age: 39
End: 2024-10-09
Payer: COMMERCIAL

## 2024-10-22 ENCOUNTER — TELEPHONE (OUTPATIENT)
Dept: OBGYN | Facility: CLINIC | Age: 39
End: 2024-10-22
Payer: COMMERCIAL

## 2024-10-22 ENCOUNTER — RESULT FOLLOW UP (OUTPATIENT)
Dept: OBGYN | Facility: CLINIC | Age: 39
End: 2024-10-22
Payer: COMMERCIAL

## 2024-10-22 NOTE — TELEPHONE ENCOUNTER
Please call and notify of normal results with the exception of low normal morphology or shapes.   Very common and nothing to do differently at this time.  May consider IUI if we were to move in that direction but can discuss further at follow up visit

## 2024-10-22 NOTE — TELEPHONE ENCOUNTER
Left message SA results have been received (scanned into Denice's charts)  Will send to Dr. Jimenez to review and update with results and plan moving forward.     Amanda Elder RN on 10/22/2024 at 10:52 AM

## 2024-10-22 NOTE — TELEPHONE ENCOUNTER
Called and lm to check her mychart w result comments/recommendations.    Maddison Salazar RN on 10/22/2024 at 12:54 PM

## 2024-10-24 ENCOUNTER — TELEPHONE (OUTPATIENT)
Dept: OBGYN | Facility: CLINIC | Age: 39
End: 2024-10-24
Payer: COMMERCIAL

## 2024-10-24 DIAGNOSIS — Z32.01 POSITIVE PREGNANCY TEST: Primary | ICD-10-CM

## 2024-10-24 NOTE — TELEPHONE ENCOUNTER
Called and spoke to pt    Informed pt to continue to monitor at home for now    ER precautions again reviewed as well as when to call clinic  Pt verbalizes understanding    Lashonda Orantes RN on 10/24/2024 at 11:25 AM  [facility] Triage

## 2024-10-24 NOTE — TELEPHONE ENCOUNTER
"LMP 9/12/24 - 6w0d  Pt reports regular periods     RN called and spoke to pt - states she noticed spotting since Tuesday night   Notices with wiping and sees spotting in underwear \"size of a marble\"  Spotting has always been dark brown in color    She is not wearing a pad or panty liner     Reports some \"light cramping\" but is not painful, just notices it is there from time to time   Pt reports she is staying hydrated     Pt reports she had a BM this morning   Denies recent IC or constipation     Denies fever/chills   Denies vaginal discomfort, pain with urination, itching or other discharge     Pt to call the clinic and ask to speak with a triage nurse if experiencing bright red, heavier bleeding and/or intense cramping or pain.     ER precautions reviewed with pt    Appt 11/26 with Dr. Jimenez for 1st OB appt    Open to seeing another provider if needed to be seen sooner/availability     Pt states she saw the midwives up until ~32 weeks last pregnancy and transferred to Dr Jimneez towards the end of pregnancy last delivery      Routing to provider to advise    Continue to monitor at home for now?  Serial HCGs with visit?  US/visit?    Lashonda Orantes, RN on 10/24/2024 at 10:39 AM  [facility] Triage     "

## 2024-10-24 NOTE — TELEPHONE ENCOUNTER
[facility] Call Center    Phone Message    May a detailed message be left on voicemail: yes     Reason for Call: pregnant pt with spotting. No answer on red flag. Secure chat message red but no response.    Please call pt to discuss. Thank you    Action Taken: Message routed to:  Other: [facility]    Travel Screening: Not Applicable

## 2024-10-24 NOTE — TELEPHONE ENCOUNTER
I would just continue to monitor with precautions as above since it has only been brown and never bright red bleeding

## 2024-10-29 ENCOUNTER — VIRTUAL VISIT (OUTPATIENT)
Dept: OBGYN | Facility: CLINIC | Age: 39
End: 2024-10-29
Payer: COMMERCIAL

## 2024-10-29 DIAGNOSIS — O09.521 SUPERVISION OF HIGH RISK ELDERLY MULTIGRAVIDA IN FIRST TRIMESTER: Primary | ICD-10-CM

## 2024-10-29 DIAGNOSIS — O36.80X0 PREGNANCY WITH INCONCLUSIVE FETAL VIABILITY: ICD-10-CM

## 2024-10-29 DIAGNOSIS — Z13.79 GENETIC SCREENING: ICD-10-CM

## 2024-10-29 PROCEDURE — 99207 PR NO CHARGE NURSE ONLY: CPT | Mod: 93

## 2024-10-29 RX ORDER — VITAMIN A, VITAMIN C, VITAMIN D-3, VITAMIN E, VITAMIN B-1, VITAMIN B-2, NIACIN, VITAMIN B-6, CALCIUM, IRON, ZINC, COPPER 4000; 120; 400; 22; 1.84; 3; 20; 10; 1; 12; 200; 27; 25; 2 [IU]/1; MG/1; [IU]/1; MG/1; MG/1; MG/1; MG/1; MG/1; MG/1; UG/1; MG/1; MG/1; MG/1; MG/1
TABLET ORAL DAILY
COMMUNITY

## 2024-10-29 ASSESSMENT — ANXIETY QUESTIONNAIRES
2. NOT BEING ABLE TO STOP OR CONTROL WORRYING: NOT AT ALL
GAD7 TOTAL SCORE: 0
7. FEELING AFRAID AS IF SOMETHING AWFUL MIGHT HAPPEN: NOT AT ALL
3. WORRYING TOO MUCH ABOUT DIFFERENT THINGS: NOT AT ALL
GAD7 TOTAL SCORE: 0
6. BECOMING EASILY ANNOYED OR IRRITABLE: NOT AT ALL
5. BEING SO RESTLESS THAT IT IS HARD TO SIT STILL: NOT AT ALL
1. FEELING NERVOUS, ANXIOUS, OR ON EDGE: NOT AT ALL

## 2024-10-29 ASSESSMENT — PATIENT HEALTH QUESTIONNAIRE - PHQ9
SUM OF ALL RESPONSES TO PHQ QUESTIONS 1-9: 0
5. POOR APPETITE OR OVEREATING: NOT AT ALL

## 2024-10-29 NOTE — Clinical Note
Routing pt chart for provider review 1st OB visit 11/26 with Dr. Tony ROSAS - Pt still having small dark brown spotting - since 10/24 when called last week. Spotting is much improved per pt, occasionally just with wiping and did notice a small dot in underwear x1. Denies pain or other vaginal/urinary symptoms

## 2024-10-29 NOTE — PROGRESS NOTES
SUBJECTIVE:     HPI:    This is a 38 year old female patient,  who presents for her first obstetrical visit.    LIBERTAD: 2025, by Last Menstrual Period.  She is 6w5d weeks.  Her cycles are regular.  Her last menstrual period was normal.   Since her LMP, she has experienced  nausea, fatigue, and spotting ).   She denies emesis, abdominal pain, headache, loss of appetite, vaginal discharge, dysuria, pelvic pain, urinary urgency, lightheadedness, urinary frequency, hemorrhoids, and constipation.    Additional History:   - 3rd baby  - AMA  - Hx c/s x2 - 1st pregnancy due to fetal intolerance, repeat c/s with 2nd  - IAB requiring D&C on 2024  - Hx anxiety and depression - no current medications or therapy. Pt states she was on a very low dose anxiety med many years ago, but was short term. Pt reports no concerns in mood at this time   - Pt reports she is still having some occasional spotting. Spotting has always been dark brown in color, notices occasionally with wiping. Did have one very small dot on underwear x1. She reports her spotting is much improved from when she called the clinic last week on 10/24 and spoke with nursing. She continued to monitor at home per MD recommendations. Denies any other vaginal/urinary s/s. Reviewed to call clinic if she is having bright red bleeding and/or pain, or fever/chills.     Have you travelled during the pregnancy?No  Have your sexual partner(s) travelled during the pregnancy?No      HISTORY:   Planned Pregnancy: Yes  Marital Status:   Occupation:  at [employer]  Living in Household: Spouse and Children    Past History:  Her past medical history   Past Medical History:   Diagnosis Date    Anxiety     Depressive disorder     no meds    Insomnia     Motion sickness    .      She has a history of  prior  section and IAB with D&C  2024    Since her last LMP she denies use of alcohol, tobacco and street drugs.    Past medical, surgical,  "social and family history were reviewed and updated in EPIC.    Confirmed patient desires delivery at [facility] with the [facility] provider.    Nurse phone visit completed. Prenatal book and folder (containing standard educational hand-outs and brochures) will be given at the next visit to patient. Information in folder reviewed over the phone. Questions answered. Brochure given on optional screening available to assess chromosomal anomalies. Pt desires NIPS/ordered. Pt advised to call the clinic if she has any questions or concerns related to her pregnancy. Prenatal labs future ordered. New prenatal visit scheduled on 11/26 with Dr. Jimenez.    Lashonda Orantes RN on 10/29/2024 at 4:00 PM  [facility] Triage     No results found for: \"PAP\"    PHQ-9 score:        10/29/2024     3:34 PM   PHQ   PHQ-9 Total Score 0   Q9: Thoughts of better off dead/self-harm past 2 weeks Not at all           9/28/2021     2:32 PM 1/25/2024     1:47 PM 10/29/2024     3:34 PM   BREANNE-7 SCORE   Total Score 5 1 0       Patient supplied answers from flow sheet for:  Prenatal OB Questionnaire.  Past Medical History  Have you ever recieved care for your mental health? : No  Have you ever been in a major accident or suffered serious trauma?: No  Within the last year, has anyone hit, slapped, kicked or otherwise hurt you?: No  In the last year, has anyone forced you to have sex when you didn't want to?: No    Past Medical History 2   Have you ever received a blood transfusion?: No  Would you accept a blood transfusion if was medically recommended?: Yes  Does anyone in your home smoke?: No   Is your blood type Rh negative?: Unknown  Have you ever ?: (!) Yes  Have you been hospitalized for a nonsurgical reason excluding normal delivery?: No  Have you ever had an abnormal pap smear?: (!) Yes (Hx abnormal pap smear in college, prior to 2010; hx colpo then and PAPs normal since)    Past Medical History " (Continued)  Do you have a history of abnormalities of the uterus?: No  Did your mother take YANDEL or any other hormones when she was pregnant with you?: Unknown  Do you have any other problems we have not asked about which you feel may be important to this pregnancy?: No    Current Outpatient Medications   Medication Sig Dispense Refill    Prenatal Vit-Fe Fumarate-FA (PRENATAL MULTIVITAMIN  PLUS IRON) 27-1 MG TABS Take by mouth daily.       No current facility-administered medications for this visit.       ROS:   12 point review of systems negative other than symptoms noted below or in the HPI.  Constitutional: Fatigue  Gastrointestinal: Nausea

## 2024-10-29 NOTE — PATIENT INSTRUCTIONS
Learning About Pregnancy  Your Care Instructions     Your health in the early weeks of your pregnancy is particularly important for your baby's health. Take good care of yourself. Anything you do that harms your body can also harm your baby.  Make sure to go to all of your doctor appointments. Regular checkups will help keep you and your baby healthy.  How can you care for yourself at home?  Diet    Choose healthy foods like fruits, vegetables, whole grains, lean proteins, and healthy fats.     Choose foods that are good sources of calcium, iron, and folate. You can try dairy products, dark leafy greens, fortified orange juice and cereals, almonds, broccoli, dried fruit, and beans.     Do not skip meals or go for many hours without eating. If you are nauseated, try to eat a small, healthy snack every 2 to 3 hours.     Avoid fish that are high in mercury. These include shark, swordfish, yeisca mackerel, marlin, orange roughy, and bigeye tuna, as well as tilefish from the [facility].     It's okay to eat up to 8 to 12 ounces a week of fish that are low in mercury or up to 4 ounces a week of fish that have medium levels of mercury. Some fish that are low in mercury are salmon, shrimp, canned light tuna, cod, and tilapia. Some fish that have medium levels of mercury are halibut and white albacore tuna.     Drink plenty of fluids. If you have kidney, heart, or liver disease and have to limit fluids, talk with your doctor before you increase the amount of fluids you drink.     Limit caffeine to about 200 to 300 mg per day. On average, a cup of brewed coffee has around 80 to 100 mg of caffeine.     Do not drink alcohol, such as beer, wine, or hard liquor.     Take a multivitamin that contains at least 400 micrograms (mcg) of folic acid to help prevent birth defects. Fortified cereal and whole wheat bread are good additional sources of folic acid.     Increase the calcium in your diet. Try to drink a quart of skim milk  each day. You may also take calcium supplements and choose foods such as cheese and yogurt.   Lifestyle    Make sure you go to your follow-up appointments.     Get plenty of rest. You may be unusually tired while you are pregnant.     Get at least 30 minutes of exercise on most days of the week. Walking is a good choice. If you have not exercised in the past, start out slowly. Take several short walks each day.     Do not smoke. If you need help quitting, talk to your doctor about stop-smoking programs. These can increase your chances of quitting for good.     Do not touch cat feces or litter boxes. Also, wash your hands after you handle raw meat, and fully cook all meat before you eat it. Wear gloves when you work in the yard or garden, and wash your hands well when you are done. Cat feces, raw or undercooked meat, and contaminated dirt can cause an infection that may harm your baby or lead to a miscarriage.     Avoid things that can make your body too hot and may be harmful to your baby, such as a hot tub or sauna. Or talk with your doctor before doing anything that raises your body temperature. Your doctor can tell you if it's safe.     Avoid chemical fumes, paint fumes, or poisons.     Do not use illegal drugs, marijuana, or alcohol.   Medicines    Review all of your medicines with your doctor. Some of your routine medicines may need to be changed to protect your baby.     Use acetaminophen (Tylenol) to relieve minor problems, such as a mild headache or backache or a mild fever with cold symptoms. Do not use nonsteroidal anti-inflammatory drugs (NSAIDs), such as ibuprofen (Advil, Motrin) or naproxen (Aleve), unless your doctor says it is okay.     Do not take two or more pain medicines at the same time unless the doctor told you to. Many pain medicines have acetaminophen, which is Tylenol. Too much acetaminophen (Tylenol) can be harmful.     Take your medicines exactly as prescribed. Call your doctor if you  "think you are having a problem with your medicine.   To manage morning sickness    Keep food in your stomach, but not too much at once. Try eating five or six small meals a day instead of three large meals.     For nausea when you wake up, eat a small snack, such as a couple of crackers or pretzels, before rising. Allow a few minutes for your stomach to settle before you slowly get up.     Try to avoid smells and foods that make you feel nauseated. High-fat or greasy foods, milk, and coffee may make nausea worse. Some foods that may be easier to tolerate include cold, spicy, sour, and salty foods.     Drink enough fluids. Water and other caffeine-free drinks are good choices.     Take your prenatal vitamins at night on a full stomach.     Try foods and drinks made with reema. Reema may help with nausea.     Get lots of rest. Morning sickness may be worse when you are tired.     Talk to your doctor about over-the-counter products, such as vitamin B6 or doxylamine, to help relieve symptoms.     Try a P6 acupressure wrist band. These anti-nausea wristbands help some people.   Follow-up care is a key part of your treatment and safety. Be sure to make and go to all appointments, and call your doctor if you are having problems. It's also a good idea to know your test results and keep a list of the medicines you take.  Where can you learn more?  Go to [email or web address]  Enter E868 in the search box to learn more about \"Learning About Pregnancy.\"  Current as of: July 10, 2023  Content Version: 14.2 2024 [facility] [employer].   Care instructions adapted under license by your healthcare professional. If you have questions about a medical condition or this instruction, always ask your healthcare professional. Healthwise, Incorporated disclaims any warranty or liability for your use of this information.    Weeks 6 to 10 of Your Pregnancy: Care Instructions  During these weeks of pregnancy, your body " "goes through many changes. You may start to feel different, both in your body and your emotions. Each pregnancy is different, so there's no \"right\" way to feel. These early weeks are a time to make healthy choices for you and your pregnancy.    Take a daily prenatal vitamin. Choose one with folic acid in it.   Avoid alcohol, tobacco, and drugs (including marijuana). If you need help quitting, talk to your doctor.     Drink plenty of liquids.  Be sure to drink enough water. And limit sodas, other sweetened drinks, and caffeine.     Choose foods that are good sources of calcium, iron, and folate.  You can try dairy products, dark leafy greens, fortified orange juice and cereals, almonds, broccoli, dried fruit, and beans.     Avoid foods that may be harmful.  Don't eat raw meat, deli meat, raw seafood, or raw eggs. Avoid soft cheese and unpasteurized dairy, like Brie and blue cheese. And don't eat fish that contains a lot of mercury, like shark and swordfish.     Don't touch holly litter or cat poop.  They can cause an infection that could be harmful during pregnancy.     Avoid things that can make your body too hot.  For example, avoid hot tubs and saunas.     Soothe morning sickness.  Try eating 5 or 6 small meals a day, getting some fresh air, or using chico to control symptoms.     Ask your doctor about flu and COVID-19 shots.  Getting them can help protect against infection.   Follow-up care is a key part of your treatment and safety. Be sure to make and go to all appointments, and call your doctor if you are having problems. It's also a good idea to know your test results and keep a list of the medicines you take.  Where can you learn more?  Go to [email or web address]  Enter G112 in the search box to learn more about \"Weeks 6 to 10 of Your Pregnancy: Care Instructions.\"  Current as of: July 10, 2023  Content Version: 14.2 2024 [facility] [employer].   Care instructions adapted under license " by your healthcare professional. If you have questions about a medical condition or this instruction, always ask your healthcare professional. [employer] disclaims any warranty or liability for your use of this information.       Pregnancy: Managing Morning Sickness (01:48)  Your health professional recommends that you watch this short online health video.  Learn how to manage morning sickness during pregnancy.   Purpose: Learn how to manage morning sickness during pregnancy.  Goal: Learn how to manage morning sickness during pregnancy.    Watch: Scan the QR code or visit the link to view video       [email or web address]  Current as of: July 10, 2023  Content Version: 14.2 2024 [employer].   Care instructions adapted under license by your healthcare professional. If you have questions about a medical condition or this instruction, always ask your healthcare professional. [employer] disclaims any warranty or liability for your use of this information.    Pregnancy and Heartburn: Care Instructions  Overview     Heartburn is a common problem during pregnancy.  Heartburn happens when stomach acid backs up into the tube that carries food to the stomach. This tube is called the esophagus. Early in pregnancy, heartburn is caused by hormone changes that slow down digestion. Later on, it's also caused by the large uterus pushing up on the stomach.  Even though you can't fix the cause, there are things you can do to get relief. Treating heartburn during pregnancy focuses first on making lifestyle changes, like changing what and how you eat, and on taking medicines.  Heartburn usually improves or goes away after childbirth.  Follow-up care is a key part of your treatment and safety. Be sure to make and go to all appointments, and call your doctor if you are having problems. It's also a good idea to know your test results and keep a list of the medicines you take.  How can  "you care for yourself at home?  Eat small, frequent meals.  Avoid foods that make your symptoms worse, such as chocolate, peppermint, and spicy foods. Avoid drinks with caffeine, such as coffee, tea, and sodas.  Avoid bending over or lying down after meals.  Take a short walk after you eat.  If heartburn is a problem at night, do not eat for 2 hours before bedtime.  Take antacids like Mylanta, Maalox, Rolaids, or Tums. Do not take antacids that have sodium bicarbonate, magnesium trisilicate, or aspirin. Be careful when you take over-the-counter antacid medicines. Many of these medicines have aspirin in them. While you are pregnant, do not take aspirin or medicines that contain aspirin unless your doctor says it is okay.  If you're not getting relief, talk to your doctor. You may be able to take a stronger acid-reducing medicine.  When should you call for help?   Call your doctor now or seek immediate medical care if:    You have new or worse belly pain.     You are vomiting.   Watch closely for changes in your health, and be sure to contact your doctor if:    You have new or worse symptoms of reflux.     You are losing weight.     You have trouble or pain swallowing.     You do not get better as expected.   Where can you learn more?  Go to [email or web address]  Enter U946 in the search box to learn more about \"Pregnancy and Heartburn: Care Instructions.\"  Current as of: July 10, 2023  Content Version: 14.2 2024 [facility] [employer].   Care instructions adapted under license by your healthcare professional. If you have questions about a medical condition or this instruction, always ask your healthcare professional. Healthwise, Incorporated disclaims any warranty or liability for your use of this information.    Constipation: Care Instructions  Overview     Constipation means that you have a hard time passing stools (bowel movements). People pass stools from 3 times a day to once every 3 days. " What is normal for you may be different. Constipation may occur with pain in the rectum and cramping. The pain may get worse when you try to pass stools. Sometimes there are small amounts of bright red blood on toilet paper or the surface of stools. This is because of enlarged veins near the rectum (hemorrhoids).  A few changes in your diet and lifestyle may help you avoid ongoing constipation. Your doctor may also prescribe medicine to help loosen your stool.  Some medicines can cause constipation. These include pain medicines and antidepressants. Tell your doctor about all the medicines you take. Your doctor may want to make a medicine change to ease your symptoms.  Follow-up care is a key part of your treatment and safety. Be sure to make and go to all appointments, and call your doctor if you are having problems. It's also a good idea to know your test results and keep a list of the medicines you take.  How can you care for yourself at home?  Drink plenty of fluids. If you have kidney, heart, or liver disease and have to limit fluids, talk with your doctor before you increase the amount of fluids you drink.  Include high-fiber foods in your diet each day. These include fruits, vegetables, beans, and whole grains.  Get at least 30 minutes of exercise on most days of the week. Walking is a good choice. You also may want to do other activities, such as running, swimming, cycling, or playing tennis or team sports.  Take a fiber supplement, such as Citrucel or Metamucil, every day. Read and follow all instructions on the label.  Schedule time each day for a bowel movement. A daily routine may help. Take your time having a bowel movement, but don't sit for more than 10 minutes at a time. And don't strain too much.  Support your feet with a small step stool when you sit on the toilet. This helps flex your hips and places your pelvis in a squatting position.  Your doctor may recommend an over-the-counter laxative to  "relieve your constipation. Examples are Milk of Magnesia and MiraLax. Read and follow all instructions on the label. Do not use laxatives on a long-term basis.  When should you call for help?   Call your doctor now or seek immediate medical care if:    You have new or worse belly pain.     You have new or worse nausea or vomiting.     You have blood in your stools.   Watch closely for changes in your health, and be sure to contact your doctor if:    Your constipation is getting worse.     You do not get better as expected.   Where can you learn more?  Go to [email or web address]  Enter P343 in the search box to learn more about \"Constipation: Care Instructions.\"  Current as of: October 19, 2023  Content Version: 14.2 2024 [email or web address].   Care instructions adapted under license by your healthcare professional. If you have questions about a medical condition or this instruction, always ask your healthcare professional. Healthwise, Incorporated disclaims any warranty or liability for your use of this information.    Learning About High-Iron Foods  What foods are high in iron?     The foods you eat contain nutrients, such as vitamins and minerals. Iron is a nutrient. Your body needs the right amount to stay healthy and work as it should. You can use the list below to help you make choices about which foods to eat.  Here are some foods that contain iron. They have 1 to 2 milligrams of iron per serving.  Fruits  Figs (dried), 5 figs  Vegetables  Asparagus (canned), 6 means  Jaky, beet, Swiss chard, or turnip greens, 1 cup  Dried peas, cooked,   cup  Seaweed, spirulina (dried),   cup  Spinach, (cooked)   cup or (raw) 1 cup  Grains  Cereals, fortified with iron, 1 cup  Grits (instant, cooked), fortified with iron,   cup  Meats and other protein foods  Beans (kidney, lima, navy, white), canned or cooked,   cup  Beef or lamb, 3 oz  Chicken giblets, 3 oz  Chickpeas (garbanzo beans),   cup  Liver " "of beef, lamb, or pork, 3 oz  Oysters (cooked), 3 oz  Sardines (canned), 3 oz  Soybeans (boiled),   cup  Tofu (firm),   cup  Work with your doctor to find out how much of this nutrient you need. Depending on your health, you may need more or less of it in your diet.  Where can you learn more?  Go to [email or web address]  Enter R005 in the search box to learn more about \"Learning About High-Iron Foods.\"  Current as of: September 20, 2023  Content Version: 14.2 2024 [employer].   Care instructions adapted under license by your healthcare professional. If you have questions about a medical condition or this instruction, always ask your healthcare professional. Healthwise, Incorporated disclaims any warranty or liability for your use of this information.    Rh Antibodies Screening During Pregnancy: About This Test  What is it?     The Rh antibodies screening test is a blood test. It checks your blood for Rh antibodies. If you have Rh-negative blood and have been exposed to Rh-positive blood, your immune system may make antibodies to attack the Rh-positive blood. When a pregnant woman has these antibodies, it is called Rh sensitization.  Why is this test done?  The Rh antibodies screening test is done during pregnancy to find out if your baby is at risk for Rh disease. This can happen if you have Rh-negative blood and your baby has Rh-positive blood. If your Rh-negative blood mixes with Rh-positive blood, your immune system will make antibodies to attack the Rh-positive blood.  During pregnancy, these antibodies could attach to the baby's red blood cells. This can cause your baby to have serious health problems. The results of this test will help your doctor know how to best care for you and your baby during your pregnancy.  How do you prepare for the test?  In general, there's nothing you have to do before this test, unless your doctor tells you to.  How is the test done?  A health " no "professional uses a needle to take a blood sample, usually from the arm.  What happens after the test?  You will probably be able to go home right away. It depends on the reason for the test.  You can go back to your usual activities right away.  Follow-up care is a key part of your treatment and safety. Be sure to make and go to all appointments, and call your doctor if you are having problems. It's also a good idea to keep a list of the medicines you take. Ask your doctor when you can expect to have your test results.  Where can you learn more?  Go to [email or web address]  Enter P722 in the search box to learn more about \"Rh Antibodies Screening During Pregnancy: About This Test.\"  Current as of: July 10, 2023  Content Version: 142024 [employer].   Care instructions adapted under license by your healthcare professional. If you have questions about a medical condition or this instruction, always ask your healthcare professional. Healthwise, Incorporated disclaims any warranty or liability for your use of this information.    Learning About Preventing Rh Disease  What is Rh disease?     Rh disease can be a serious problem in pregnancy. It happens when substances called antibodies in the mother's blood cause red blood cells in her baby's blood to be destroyed. This can occur when the blood types of a mother and her baby do not match.  All blood has an Rh factor. This is what makes a blood type positive or negative. When you are Rh-negative, your baby may be Rh-negative or Rh-positive. If your baby has Rh-positive blood and it mixes with yours, your body will make antibodies. This is called Rh sensitization.  Most of the time, this is not a problem in a first pregnancy. But in future pregnancies, it could cause Rh disease.  A  with Rh disease has mild anemia and may have jaundice. In severe cases, anemia, jaundice, and swelling can be very dangerous or fatal. Some babies need " "to be delivered early. Some need special care in the NICU. A very sick baby will need a blood transfusion before or after birth.  Fortunately, Rh sensitization is usually easy to prevent.  That's why it's important to get your Rh status checked in your first trimester. It doesn't cause any warning signs. A blood test is the only way to know if you are Rh-sensitive or are at risk for it.  How can you prevent Rh disease?  If you are Rh-negative, your doctor gives you an Rh immune globulin shot (such as RhoGAM). It helps prevent your body from making the antibodies that attack your baby's red blood cells.  Timing is important. You need the shot at certain times during your pregnancy. And you need one anytime there is a chance that your baby's blood might mix with yours. That can happen with certain prenatal tests or when you have pregnancy bleeding, such as:  Right after any pregnancy loss, amniocentesis, or CVS testing.  After turning of a breech baby.  Before and maybe after childbirth. Your doctor gives you a shot around week 28. If your  is Rh-positive, you will have another shot.  Follow-up care is a key part of your treatment and safety. Be sure to make and go to all appointments, and call your doctor if you are having problems. It's also a good idea to know your test results and keep a list of the medicines you take.  Where can you learn more?  Go to [email or web address]  Enter W177 in the search box to learn more about \"Learning About Preventing Rh Disease.\"  Current as of: July 10, 2023  Content Version: 2024 [facility] [employer].   Care instructions adapted under license by your healthcare professional. If you have questions about a medical condition or this instruction, always ask your healthcare professional. Healthwise, Incorporated disclaims any warranty or liability for your use of this information.    Learning About Rh Immunoglobulin Shots  Introduction     An Rh " immunoglobulin shot is given to pregnant women who have Rh-negative blood.  You may have Rh-negative blood, and your baby may have Rh-positive blood. If the two types of blood mix, your body will make antibodies. This is called Rh sensitization. Most of the time, this is not a problem the first time you're pregnant. But it could cause problems in future pregnancies.  This shot keeps your body from making the antibodies. You get the shot around 28 weeks of pregnancy. After the birth, your baby's blood is tested. If the blood is Rh positive, you will get another shot. You may also get the shot if you have vaginal bleeding while you are pregnant or if you have a miscarriage. These shots protect future pregnancies.  Women with Rh negative blood will need this shot each time they get pregnant.  Example  Rh immunoglobulin (HypRho-D, MICRhoGAM, and RhoGAM)  Possible side effects  Rare side effects may include:  Some mild pain where you got the shot.  A slight fever.  An allergic reaction.  You may have other side effects not listed here. Check the information that comes with your medicine.  What to know about taking this medicine  You may need more than one shot. You may need the shot again:  After amniocentesis, fetal blood sampling, or chorionic villus sampling tests.  If you have bleeding in your second or third trimester.  After turning of a breech baby.  After an injury to the belly while you are pregnant.  After a miscarriage or an .  Before or right after treatment for an ectopic or a partial molar pregnancy.  Tell your doctor if you have any allergies or have had a bad response to medicines in the past.  If you get this shot within 3 months of getting a live-virus vaccine, the vaccine may not work. Your doctor will tell you if you need more vaccine.  Check with your doctor or pharmacist before you use any other medicines. This includes over-the-counter medicines. Make sure your doctor knows all of the  "medicines, vitamins, herbs, and supplements you take. Taking some medicines at the same time can cause problems.  Where can you learn more?  Go to https://[email or web address].net/[email or web address]  Enter V615 in the search box to learn more about \"Learning About Rh Immunoglobulin Shots.\"  Current as of: July 10, 2023  Content Version: 14.2 2024 [employer].   Care instructions adapted under license by your healthcare professional. If you have questions about a medical condition or this instruction, always ask your healthcare professional. [employer]wise[employer] Incorporated disclaims any warranty or liability for your use of this information.    Rubella (L[place]anese Measles): Care Instructions  Overview  Rubella, also called Lebanese measles or 3-day measles, is a disease caused by a virus. It spreads by coughs, sneezes, and close contact. Rubella usually is mild and does not cause long-term problems. But if you are pregnant and get it, you can give the disease to your unborn baby. This can cause serious birth defects.  While you have rubella, you may get a rash and a mild fever, and the lymph glands in your neck may swell. Older children often have a fever, eye pain, a sore throat, and body aches. You can relieve most symptoms with care at home. Avoid being around others, especially pregnant people, until your rash has been gone for at least 4 days. People who have not had this disease before or have not had the vaccine have the greatest chance of getting the virus.  Follow-up care is a key part of your treatment and safety. Be sure to make and go to all appointments, and call your doctor if you are having problems. It's also a good idea to know your test results and keep a list of the medicines you take.  How can you care for yourself at home?  Drink plenty of fluids. If you have kidney, heart, or liver disease and have to limit fluids, talk with your doctor before you increase the amount of fluids you drink.  Get plenty of rest " "to help your body heal.  Take an over-the-counter pain medicine, such as acetaminophen (Tylenol), ibuprofen (Advil, Motrin), or naproxen (Aleve), to reduce fever and discomfort. Read and follow all instructions on the label. Do not give aspirin to anyone younger than 20. It has been linked to Reye syndrome, a serious illness.  Do not take two or more pain medicines at the same time unless the doctor told you to. Many pain medicines have acetaminophen, which is Tylenol. Too much acetaminophen (Tylenol) can be harmful.  Try not to scratch the rash. Put cold, wet cloths on the rash to reduce itching.  Do not smoke. Smoking can make your symptoms worse. If you need help quitting, talk to your doctor about stop-smoking programs and medicines. These can increase your chances of quitting for good.  Avoid contact with people who have never had rubella and who have not been immunized.  When should you call for help?   Call your doctor now or seek immediate medical care if:    You have a fever with a stiff neck or a severe headache.     You are sensitive to light or feel very sleepy or confused.   Watch closely for changes in your health, and be sure to contact your doctor if:    You do not get better as expected.   Where can you learn more?  Go to https://www.[employer].net/patiented  Enter B812 in the search box to learn more about \"Rubella ([place] Measles): Care Instructions.\"  Current as of: June 12, 2023  Content Version: 14.2 2024 [facility] [employer].   Care instructions adapted under license by your healthcare professional. If you have questions about a medical condition or this instruction, always ask your healthcare professional. Healthwise, Incorporated disclaims any warranty or liability for your use of this information.    Gonorrhea and Chlamydia: About These Tests  What is it?  These tests use a sample of urine or other body fluid to look for the bacteria that cause these sexually transmitted infections " "(STIs). The fluid sample can come from the cervix, vagina, rectum, throat, or eyes.  Why is this test done?  These tests may be done to:  Find out if symptoms are caused by gonorrhea or chlamydia.  Check people who are at high risk of being infected with gonorrhea or chlamydia.  Retest people several months after they have been treated for gonorrhea or chlamydia.  Check for infection in your  if you had a gonorrhea or chlamydia infection at the time of delivery.  How can you prepare for the test?  If you are going to have a urine test, do not urinate for at least 1 hour before the test.  If you think you may have chlamydia or gonorrhea, don't have sexual intercourse until you get your test results. And you may want to have tests for other STIs, such as HIV.  How is the test done?  For a direct sample, a swab is used to collect body fluid from the cervix, vagina, rectum, throat, or eyes. Your doctor may collect the sample. Or you may be given instructions on how to collect your own sample.  For a urine sample, you will collect the urine that comes out when you first start to urinate. Don't wipe the genital area clean before you urinate.  How long does the test take?  The test will take a few minutes.  What happens after the test?  You will be able to go home right away.  You can go back to your usual activities right away.  If you do have an infection, don't have sexual intercourse for 7 days after you start treatment. And your sex partner(s) should also be treated.  Follow-up care is a key part of your treatment and safety. Be sure to make and go to all appointments, and call your doctor if you are having problems. It's also a good idea to keep a list of the medicines you take. Ask your doctor when you can expect to have your test results.  Where can you learn more?  Go to https://www.healthwise.net/patiented  Enter K976 in the search box to learn more about \"Gonorrhea and Chlamydia: About These " "Tests.\"  Current as of: November 27, 2023  Content Version: 14.2 2024 [facility] [employer].   Care instructions adapted under license by your healthcare professional. If you have questions about a medical condition or this instruction, always ask your healthcare professional. Healthwise[employer] Inc[employer] disclaims any warranty or liability for your use of this information.    Trichomoniasis: About This Test  What is it?     This test uses a sample of urine or other body fluid to look for the tiny parasite that causes trichomoniasis (also called trich). The fluid sample can come from the vagina, cervix, or urethra. Your doctor may choose to use one or more of many available tests.  Why is it done?  A trich test may be done to:  Find out if symptoms are caused by trich.  Check people who are at high risk for being infected with trich.  Check after treatment to make sure that the infection is gone.  How do you prepare for the test?  If you are going to have a urine test, do not urinate for at least 1 hour before the test.  How is the test done?  For a direct sample, a swab is used to collect body fluid from the cervix, vagina, or urethra. Your doctor may collect the sample. Or you may be given instructions on how to collect your own sample.  For a urine sample, you will collect the urine that comes out when you first start to urinate. Don't wipe the area clean before you urinate.  How long does the test take?  It will take a few minutes to collect a sample.  What happens after the test?  You can go home right away.  You can go back to your usual activities right away.  You may get the test results the same day or several days later. It depends on the test used.  If you do have an infection, don't have sexual intercourse for 7 days after you start treatment. Your sex partner or partners should also be treated.  Follow-up care is a key part of your treatment and safety. Be sure to make and go to all appointments, and " call your doctor if you are having problems. Ask your doctor when you can expect to have your test results.  Current as of: November 27, 2023  Content Version: 14.2 2024 [facility] [employer].   Care instructions adapted under license by your healthcare professional. If you have questions about a medical condition or this instruction, always ask your healthcare professional. Healthwise[employer] Incorporated disclaims any warranty or liability for your use of this information.    HIV Testing: Care Instructions  Overview  You can get tested for the human immunodeficiency virus (HIV). Most doctors use a blood test to check for HIV antibodies and antigens in your blood. It may also check for the genetic material (RNA) of HIV. Some tests use saliva to check for HIV antibodies. But these aren't as accurate. For example, they may give a false result if you've just been infected.  What do the results mean?        Normal (negative)   No HIV antibodies, antigens, or RNA were found.  You may need more testing. It can make sure your test results are correct.        Uncertain (indeterminate)   Test results didn't clearly show if you have an HIV infection.  HIV antibodies or antigens may not have formed yet.  Some other type of antibody or antigen may have affected the results.  You will need another test to be sure.        Abnormal (positive)   HIV antibodies, antigens, or RNA were found.  If you haven't had an RNA test yet, one will be done. If it's positive, you have HIV.  If your test result is positive, your doctor will talk to you. You will discuss starting treatment.  Follow-up care is a key part of your treatment and safety. Be sure to make and go to all appointments, and call your doctor if you are having problems. It's also a good idea to know your test results and keep a list of the medicines you take.  Where can you learn more?  Go to https://www.[email or web address]/patiented  Enter T792 in the search box to learn more about  "\"HIV Testing: Care Instructions.\"  Current as of: June 12, 2023  Content Version: 14.2 2024 [facility].   Care instructions adapted under license by your healthcare professional. If you have questions about a medical condition or this instruction, always ask your healthcare professional. Healthwise[employer] Incorporated disclaims any warranty or liability for your use of this information.    Hepatitis C Virus Tests: About These Tests  What are they?     Hepatitis C virus tests are blood tests that check for substances in the blood that show whether you have hepatitis C now or had it in the past. The tests can also tell you what type of hepatitis C you have and how severe the disease is. This can help your doctor with treatment.  If the tests show that you have long-term hepatitis C, you need to take steps to prevent spreading the disease.  Why are these tests done?  You may need these tests if:  You have symptoms of hepatitis.  You may have been exposed to the virus. You are more likely to have been exposed to the virus if you inject drugs or are exposed to body fluids (such as if you are a health care worker).  You've had other tests that show you have liver problems.  You are 18 to 79 years old.  You have an HIV infection.  The tests also are done to help your doctor decide about your treatment and see how well it works.  How do you prepare for the test?  In general, there's nothing you have to do before this test, unless your doctor tells you to.  How is the test done?  A health professional uses a needle to take a blood sample, usually from the arm.  What happens after these tests?  You will probably be able to go home right away.  You can go back to your usual activities right away.  Follow-up care is a key part of your treatment and safety. Be sure to make and go to all appointments, and call your doctor if you are having problems. It's also a good idea to keep a list of the medicines you take. Ask your " "doctor when you can expect to have your test results.  Where can you learn more?  Go to https://www.[email or web address]/patiented  Enter W551 in the search box to learn more about \"Hepatitis C Virus Tests: About These Tests.\"  Current as of: June 12, 2023  Content Version: 14.2 2024 [employer].   Care instructions adapted under license by your healthcare professional. If you have questions about a medical condition or this instruction, always ask your healthcare professional. Healthwise, Incorporated disclaims any warranty or liability for your use of this information.    Learning About Fetal Ultrasound Results  What is a fetal ultrasound?     Fetal ultrasound is a test that lets your doctor see an image of your baby. Your doctor learns information about your baby from this picture. You may find out, for example, if you are having a boy or a girl. But the main reason you have this test is to get information about your baby's health.  (You may hear your baby called a fetus. This is a common medical term for a baby that's growing in the mother's uterus.)  What kind of information can you learn from this test?  The findings of an ultrasound fall into two categories, normal and abnormal.  Normal  The fetus is the right size for its age.  The placenta is the expected size and does not cover the cervix.  There is enough amniotic fluid in the uterus.  No birth defects can be seen.  Abnormal  The fetus is small or large for its age.  The placenta covers the cervix.  There is too much or too little amniotic fluid in the uterus.  The fetus may have a birth defect.  What does an abnormal result mean?  Abnormal seems to imply that something is wrong with your baby. But what it means is that the test has shown something the doctor wants to take a closer look at.  And that's what happens next. Your doctor will talk to you about what further test or tests you may need.  What do the results mean?  Some of the things your " doctor may see on an abnormal ultrasound include:  Echogenic bowel.  The bowel looks very bright on the screen. This could mean that there's blood in the bowel. Or it could mean that something is blocking the small bowel.  Increased nuchal translucency.  The ultrasound measures the thickness at the back of the baby's neck. An increase in thickness is sometimes an early sign of Down syndrome.  Increased or decreased amniotic fluid.  The doctor will look for a reason for the level of amniotic fluid and will watch the pregnancy closely as it progresses.  Large ventricles.  Ventricles in the brain look larger than they should. Your doctor may take a closer look at the brain.  Renal pyelectasis/hydronephrosis.  The ultrasound measures the fluid around the kidney. If there is more fluid than expected, there is a chance of urinary tract or kidney problems.  Short long bones.  The ultrasound measures certain arm and leg bones. A long bone (humerus or femur) that is shorter than average could be a sign of Down syndrome.  Subchorionic hemorrhage.  An ultrasound can show bleeding under one of the membranes that surrounds the fetus. Some women don't have symptoms of bleeding. The ultrasound can find this problem when women are not bleeding from their vagina. Women who have this condition have a slightly higher chance of miscarriage.  What do you do now?  Take a deep breath, and let it out. Keep in mind that an abnormal finding on an ultrasound, after it's coupled with more information, may:  Turn out to be nothing.  Turn out to be something mild that won't affect the baby.  Turn out to be something more serious. But if this happens, early diagnosis helps you and your doctor plan treatment options sooner rather than later.  Your medical team is there for you. So are your family and friends. Ask questions, and get the help and support you need.  Follow-up care is a key part of your treatment and safety. Be sure to make and go to  "all appointments, and call your doctor if you are having problems. It's also a good idea to know your test results and keep a list of the medicines you take.  Where can you learn more?  Go to https://www.[employer].net/patiented  Enter K451 in the search box to learn more about \"Learning About Fetal Ultrasound Results.\"  Current as of: July 10, 2023  Content Version: 14.2 2024 [employer].   Care instructions adapted under license by your healthcare professional. If you have questions about a medical condition or this instruction, always ask your healthcare professional. Healthwise, Incorporated disclaims any warranty or liability for your use of this information.    Learning About Prenatal Visits  Overview     Regular prenatal visits are very important during any pregnancy. These quick office visits may seem simple and routine. But they can help you have a safe and healthy pregnancy. Your doctor is watching for problems that can only be found through regular checkups. The visits also give you and your doctor time to build a good relationship.  After your first visit, you will most likely start on a schedule of monthly visits. In your third trimester, the visits will get more frequent. Based on your health, your age, and if you've had a normal, full-term pregnancy before, your doctor may want to see you more or less often.  At different times in your pregnancy, you will have exams and tests. Some are routine. Others are done only when there is a chance of a problem. Everything healthy you do for your body helps you have a healthy pregnancy. Rest when you need it. Eat well, drink plenty of water, and exercise regularly.  What happens during a prenatal visit?  You will have blood pressure checks, along with urine tests. You also may have blood tests. If you need to go to the bathroom while waiting for the doctor, tell the nurse. You will be given a sample cup so your urine can be tested.  You will be " weighed and have your belly measured.  Your doctor may listen to the fetal heartbeat with a special device.  At about 24 weeks, and possibly earlier in your pregnancy, your doctor will check your blood sugar (glucose tolerance test) for diabetes that can occur during pregnancy. This is gestational diabetes, which can be harmful.  You will have tests to check for infections that could harm your . These include group B streptococcus and hepatitis B.  Your doctor may do ultrasounds to check for problems. This also checks the position of the fetus. An ultrasound uses sound waves to produce a picture of the fetus.  You may get your vaccines updated.  Your doctor may ask you questions to check for signs of anxiety or depression. Tell your doctor if you feel sad, anxious, or hopeless for more than a few days.  You may have other tests at any time during your pregnancy.  Use your visits to discuss with your doctor any concerns you have.  How can you care for yourself at home?  Get plenty of rest.  Try to exercise every day, if your doctor says it is okay. If you have not exercised in the past, start out slowly. For example, you can take short walks each day.  Choose healthy foods, such as fruits, vegetables, whole grains, lean proteins, low-fat dairy, and healthy fats.  Drink plenty of fluids. Cut down on drinks with caffeine, such as coffee, tea, and cola. If you have kidney, heart, or liver disease and have to limit fluids, talk with your doctor before you increase the amount of fluids you drink.  Try to avoid chemical fumes, paint fumes, and poisons.  If you smoke, vape, or use alcohol, marijuana, or other drugs, quit or cut back as much as you can. Talk to your doctor if you need help quitting.  Review all of your medicines, including over-the-counter medicines and supplements, with your doctor. Some of your routine medicines may need to be changed. Do not stop or start taking any medicines without talking to  "your doctor first.  Follow-up care is a key part of your treatment and safety. Be sure to make and go to all appointments, and call your doctor if you are having problems. It's also a good idea to know your test results and keep a list of the medicines you take.  Where can you learn more?  Go to https://www.[employer].net/patiented  Enter J502 in the search box to learn more about \"Learning About Prenatal Visits.\"  Current as of: July 10, 2023  Content Version: 14.2 2024 [employer].   Care instructions adapted under license by your healthcare professional. If you have questions about a medical condition or this instruction, always ask your healthcare professional. Healthwise, Incorporated disclaims any warranty or liability for your use of this information.    Intimate Partner Violence: Care Instructions  Overview     If you want to save this information but don't think it is safe to take it home, see if a trusted friend can keep it for you. Plan ahead. Know who you can call for help, and memorize the phone number.   Be careful online too. Your online activity may be seen by others. Do not use your personal computer or device to read about this topic. Use a safe computer, such as one at work, a friend's home, or a library.    Intimate partner violence--a type of domestic abuse--is different from an argument now and then. It is a pattern of abuse that one person may use to control another person's behavior. It may start with threats and name-calling. Then, it may lead to more serious acts, like pushing and slapping. The abuse also may occur in other areas. For example, the abuser may withhold money or spend a partner's money without their knowledge.  Abuse can cause serious harm. You are more likely to have a long-term health problem from the injuries and stress of living in a violent relationship. People who are sexually abused by their partners have more sexually transmitted infections and unplanned " "pregnancies. Anyone who is abused also faces emotional pain. Anyone can be abused in relationships. In some relationships, both people use abusive behavior.  If you are pregnant, abuse can cause problems such as poor weight gain, infections, and bleeding. Abuse during this time may increase your baby's risk of low birth weight, premature birth, and death.  Follow-up care is a key part of your treatment and safety. Be sure to make and go to all appointments, and call your doctor if you are having problems. It's also a good idea to know your test results and keep a list of the medicines you take.  How can you care for yourself at home?  If you do not have a safe place to stay, discuss this with your doctor before you leave.  Have a plan for where to go, how to leave your home, and where to stay in case of an emergency. Do not tell your partner about your plan. Contact:  The National Domestic Violence Hotline toll-free at 1-312.479.2138. They can help you find resources in your area.  Your local police department, hospital, or clinic for information about shelters and safe homes near you.  Talk to a trusted friend or neighbor, a counselor, or a micaela leader. Do not feel that you have to hide what happened.  Teach your children how to call for help in an emergency.  Be alert to warning signs, such as threats, heavy alcohol use, or drug use. This can help you avoid danger.  If you can, make sure that there are no guns or other weapons in your home.  When should you call for help?   Call 911 anytime you think you may need emergency care. For example, call if:    You or someone else has just been abused.     You think you or someone else is in danger of being abused.   Watch closely for changes in your health, and be sure to contact your doctor if you have any problems.  Where can you learn more?  Go to https://www.healthwise.net/patiented  Enter G282 in the search box to learn more about \"Intimate Partner Violence: Care " "Instructions.\"  Current as of: June 24, 2023  Content Version: 14.2 2024 [facility] [employer].   Care instructions adapted under license by your healthcare professional. If you have questions about a medical condition or this instruction, always ask your healthcare professional. Healthwise[employer] Incorporated disclaims any warranty or liability for your use of this information.    Intimate Partner Violence Safety Instructions: Care Instructions  Overview     If you want to save this information but don't think it is safe to take it home, see if a trusted friend can keep it for you. Plan ahead. Know who you can call for help, and memorize the phone number.   Be careful online too. Your online activity may be seen by others. Do not use your personal computer or device to read about this topic. Use a safe computer, such as one at work, a friend's home, or a library.    When you are abused by a spouse or partner, you can take actions to protect yourself and your children.  You can increase your safety whether you decide to stay with your spouse or partner or you decide to leave. You may want to make a safety plan and pack a bag ahead of time. This will help you leave quickly when you decide to. Remember, you cannot change your partner's actions, but you can find help for you and your children. No one deserves to be abused.  Follow-up care is a key part of your treatment and safety. Be sure to make and go to all appointments, and call your doctor if you are having problems. It's also a good idea to know your test results and keep a list of the medicines you take.  How can you care for yourself at home?  Make a plan for your safety   If you decide to stay with your abusive spouse or partner, you can do the following to increase your safety:  Decide what works best to keep you safe in an emergency.  Know who you can call to help you in an emergency.  Decide if you will call the police if you get hurt again. If you can, " agree on a signal with your children or neighbor to call the police for you if you need help. You can flash lights or hang something out of a window.  Choose a safe place to go for a short time if you need to leave home. Memorize the address and phone number.  Learn escape routes out of your home in case you need to leave in a hurry. Teach your children different ways to get out of your home quickly if they need to.  If you can, hide or lock up things that can be used as weapons (guns, knives, hammers).  Learn the number of a domestic violence shelter. Talk to the people there about how they can help.  Find out about other community resources that can help you.  Take pictures of bruises or other injuries if you can. You can also take pictures of things your abuser has broken.  Teach your children that violence is never okay. Tell them that they do not deserve to be hurt.  Pack a bag   Prepare a bag with things you will need if you leave suddenly. Leave it with a friend, a relative, or someone else you trust. You could include the following items in the bag:  A set of keys to your home and car.  Emergency phone numbers and addresses.  Money such as cash or checks. You can also ask a friend, a relative, or someone else you trust to hold money for you.  Copies of legal documents such as house and car titles or rent receipts, birth certificates, Social Security card, voter registration, marriage and 's licenses, and your children's health records.  Personal items you would need for a few days, such as clothes, a toothbrush, toothpaste, and any medicines you or your children need.  A favorite toy or book for your child or children.  Diapers and bottles, if you have very young children.  Pictures that show signs of abuse and violence. You may also add pictures of your abuser.  If you leave   If you decide to leave, you can take the following steps:  Go to the emergency room at a hospital if you have been  "hurt.  Think about asking the police to be with you as you leave. They can protect you as you leave your home.  If you decide to leave secretly, remember that activities can be tracked. Your abuser may still have access to your cell phone, email, and credit cards. It may be possible for these to be traced. Always be aware of your surroundings.  If this is an emergency, do not worry about gathering up anything. Just leave--your safety is most important.  If your abuser moves out, change the locks on the doors. If you have a security system, change the access code.  When should you call for help?   Call 911 anytime you think you may need emergency care. For example, call if:    You or someone else has just been abused.     You think you or someone else is in danger of being abused.   Watch closely for changes in your health, and be sure to contact your doctor if you have any problems.  Where can you learn more?  Go to https://www.[employer].net/patiented  Enter A752 in the search box to learn more about \"Intimate Partner Violence Safety Instructions: Care Instructions.\"  Current as of: June 24, 2023  Content Version: 14.2 2024 [employer].   Care instructions adapted under license by your healthcare professional. If you have questions about a medical condition or this instruction, always ask your healthcare professional. Healthwise[employer] Incorporated disclaims any warranty or liability for your use of this information.    Learning About Intimate Partner Violence  What is intimate partner violence?  Intimate partner violence is a type of domestic abuse. It's threatening, emotionally harmful, or violent behavior in a personal relationship. It can happen between past or current partners or spouses. In some relationships both people abuse each other. One partner may be more abusive. Or the abuse may be equal.  Abuse can affect people of any ethnic group, race, or Congregational. It can affect teens, adults, or the " elderly. And it can happen to people of any sexual orientation, gender, or social status.  Abusers use fear, bullying, and threats to control their partners. They may control what their partners do. They may control where their partners go or who they see. They may act jealous, controlling, or possessive. These early signs of abuse may happen soon after the start of the relationship. Sometimes it can be hard to notice abuse at first. But after the relationship becomes more serious, the abuse may get worse.  If you are being abused in your relationship, it's important to get help. The abuse is not your fault. You don't have to face it alone.  Be careful  It may not be safe to take home domestic abuse information like this handout. Some people ask a trusted friend to keep it for them. It's also important to plan ahead and to memorize the phone number of places you can go for help. If you are concerned about your safety, do not use your computer, smartphone, or tablet to read about domestic abuse.   What are the types of intimate partner violence?  Abuse can happen in different ways. Each type can happen on its own or in combination with others.  Emotional abuse  Emotional abuse is a pattern of threats, insults, or controlling behavior. It includes verbal abuse. It goes beyond healthy disagreements in a relationship. It's a sign of an unhealthy relationship.  Do you feel threatened, intimidated, or controlled?  Does your partner:  Threaten your children, other family members, or pets?  Use jokes meant to embarrass or shame you?  Call you names?  Tell you that you are a bad parent?  Threaten to take away your children?  Threaten to have you or your family members deported?  Control your access to money or other basic needs?  Control what you do, who you see or talk to, or where you go?  Another form of emotional abuse is denying that it is happening. Or the abuser may act like the abuse is no big deal or is your  fault.  Sexual abuse  With sexual abuse, abusers may try to convince or force you to have sex. They may force you into sex acts you're not comfortable with. Or they may sexually assault you. Sexual abuse can happen even if you are in a committed relationship.  Physical abuse  Physical abuse means that a partner hits, kicks, or does something else to physically hurt you. Physical abuse that starts with a slap might lead to kicking, shoving, and choking over time. The abuser may also threaten to hurt or kill you.  Stalking  Stalking means that an abuser gives you attention that you do not want and that causes you fear. Examples of stalking include:  Following you.  Showing up at places where the abuser isn't invited, such as at your work or school.  Constantly calling or texting you.  What problems can  to?  Intimate partner violence can be very dangerous. It can cause serious, repeated injury. It can even lead to death.  All forms of abuse can cause long-term health problems from the stress of a violent relationship. Verbal abuse can lead to sexual and physical abuse.  Abuse causes:  Emotional pain.  Depression.  Anxiety.  Post-traumatic stress.  Sexual abuse can lead to sexually transmitted infections (such as HIV/AIDS) and unplanned pregnancy.  Pregnancy can be a very dangerous time for people in abusive relationships. Abuse can cause or increase the risk of problems during pregnancy. These include low weight gain, anemia, infections, and bleeding. Abuse may also increase your baby's risk of low birth weight, premature birth, and death.  It can be hard for some victims of abuse to ask for help or to leave their relationship. You may feel scared, stuck, or not sure what steps to take. But it's important not to ignore abuse. Talking to someone you trust could be the first step to ending the abuse and taking care of your own health and happiness again. There are resources available that can help keep you  "safe.  Where can you get help?  Talk to a trusted friend. Find a local advocacy group, or talk to your doctor about the abuse.  Contact the National Domestic Violence Hotline at 4-774-479-TDQR (1-346.454.3495) for more safety tips. They can guide you to groups in your area that can help. Or go to the National Coalition Against Domestic Violence website at www.[employer].org to learn more.  Domestic violence groups or a counselor in your area can help you make a safety plan for yourself and your children.  When to call for help  Call 911 anytime you think you may need emergency care. For example, call if:  You think that you or someone you know is in danger of being abused.  You have been hurt and can't have someone safely take you to emergency care.  You have just been abused.  A family member has just been abused.  Where can you learn more?  Go to https://www.[employer].net/patiented  Enter S665 in the search box to learn more about \"Learning About Intimate Partner Violence.\"  Current as of: June 24, 2023  Content Version: 14.2 2024 "[employer]   Care instructions adapted under license by your healthcare professional. If you have questions about a medical condition or this instruction, always ask your healthcare professional. Healthwise, Incorporated disclaims any warranty or liability for your use of this information.    Vaginal Bleeding During Pregnancy: Care Instructions  Overview     It's common to have some vaginal spotting when you are pregnant. In some cases, the bleeding isn't serious. And there aren't any more problems with the pregnancy.  But sometimes bleeding is a sign of a more serious problem. This is more common if the bleeding is heavy or painful. Examples of more serious problems include miscarriage, an ectopic pregnancy, and a problem with the placenta.  You may have to see your doctor again to be sure everything is okay. You may also need more tests to find the cause of the " bleeding.  Home treatment may be all you need. But it depends on what is causing the bleeding. Be sure to tell your doctor if you have any new symptoms or if your symptoms get worse.  The doctor has checked you carefully, but problems can develop later. If you notice any problems or new symptoms, get medical treatment right away.  Follow-up care is a key part of your treatment and safety. Be sure to make and go to all appointments, and call your doctor if you are having problems. It's also a good idea to know your test results and keep a list of the medicines you take.  How can you care for yourself at home?  If your doctor prescribed medicines, take them exactly as directed. Call your doctor if you think you are having a problem with your medicine.  Do not have vaginal sex until your doctor says it's okay.  Do not put anything in your vagina until your doctor says it's okay.  Ask your doctor about other activities you can or can't do.  Get a lot of rest. Being pregnant can make you tired.  Do not use nonsteroidal anti-inflammatory drugs (NSAIDs), such as ibuprofen (Advil, Motrin), naproxen (Aleve), or aspirin, unless your doctor says it is okay.  When should you call for help?   Call 911 anytime you think you may need emergency care. For example, call if:    You passed out (lost consciousness).     You have severe vaginal bleeding. This means you are soaking through a pad each hour for 2 or more hours.     You have sudden, severe pain in your belly or pelvis.   Call your doctor now or seek immediate medical care if:    You have new or worse vaginal bleeding.     You are dizzy or lightheaded, or you feel like you may faint.     You have pain in your belly, pelvis, or lower back.     You think that you are in labor.     You have a sudden release of fluid from your vagina.     You've been having regular contractions for an hour. This means that you've had at least 8 contractions within 1 hour or at least 4  contractions within 20 minutes, even after you change your position and drink fluids.     You notice that your baby has stopped moving or is moving much less than normal.   Watch closely for changes in your health, and be sure to contact your doctor if you have any problems.  Current as of: July 10, 2023  Content Version: 14.2    2024 [employer] [employer].   Care instructions adapted under license by your healthcare professional. If you have questions about a medical condition or this instruction, always ask your healthcare professional. Healthwise, Incorporated disclaims any warranty or liability for your use of this information.

## 2024-11-21 PROBLEM — O34.219 PREVIOUS CESAREAN DELIVERY, ANTEPARTUM: Status: ACTIVE | Noted: 2024-11-21

## 2024-11-25 LAB
ABO/RH(D): NORMAL
ANTIBODY SCREEN: NEGATIVE
SPECIMEN EXPIRATION DATE: NORMAL

## 2024-11-26 ENCOUNTER — ANCILLARY PROCEDURE (OUTPATIENT)
Dept: ULTRASOUND IMAGING | Facility: CLINIC | Age: 39
End: 2024-11-26
Attending: OBSTETRICS & GYNECOLOGY
Payer: COMMERCIAL

## 2024-11-26 ENCOUNTER — PRENATAL OFFICE VISIT (OUTPATIENT)
Dept: OBGYN | Facility: CLINIC | Age: 39
End: 2024-11-26
Attending: OBSTETRICS & GYNECOLOGY
Payer: COMMERCIAL

## 2024-11-26 VITALS
HEIGHT: 67 IN | DIASTOLIC BLOOD PRESSURE: 70 MMHG | WEIGHT: 180 LBS | BODY MASS INDEX: 28.25 KG/M2 | SYSTOLIC BLOOD PRESSURE: 112 MMHG

## 2024-11-26 DIAGNOSIS — O30.041 DICHORIONIC DIAMNIOTIC TWIN PREGNANCY IN FIRST TRIMESTER: ICD-10-CM

## 2024-11-26 DIAGNOSIS — O09.521 SUPERVISION OF HIGH RISK ELDERLY MULTIGRAVIDA IN FIRST TRIMESTER: Primary | ICD-10-CM

## 2024-11-26 DIAGNOSIS — O34.219 PREVIOUS CESAREAN DELIVERY, ANTEPARTUM: ICD-10-CM

## 2024-11-26 DIAGNOSIS — O09.521 SUPERVISION OF HIGH RISK ELDERLY MULTIGRAVIDA IN FIRST TRIMESTER: ICD-10-CM

## 2024-11-26 DIAGNOSIS — Z13.79 GENETIC SCREENING: ICD-10-CM

## 2024-11-26 DIAGNOSIS — O36.80X0 PREGNANCY WITH INCONCLUSIVE FETAL VIABILITY: ICD-10-CM

## 2024-11-26 LAB
ALBUMIN UR-MCNC: NEGATIVE MG/DL
APPEARANCE UR: CLEAR
BACTERIA #/AREA URNS HPF: ABNORMAL /HPF
BILIRUB UR QL STRIP: NEGATIVE
COLOR UR AUTO: YELLOW
ERYTHROCYTE [DISTWIDTH] IN BLOOD BY AUTOMATED COUNT: 12.3 % (ref 10–15)
EST. AVERAGE GLUCOSE BLD GHB EST-MCNC: 97 MG/DL
GLUCOSE UR STRIP-MCNC: NEGATIVE MG/DL
HBA1C MFR BLD: 5 % (ref 0–5.6)
HCT VFR BLD AUTO: 39.3 % (ref 35–47)
HGB BLD-MCNC: 13.1 G/DL (ref 11.7–15.7)
HGB UR QL STRIP: ABNORMAL
KETONES UR STRIP-MCNC: NEGATIVE MG/DL
LEUKOCYTE ESTERASE UR QL STRIP: NEGATIVE
MCH RBC QN AUTO: 30.9 PG (ref 26.5–33)
MCHC RBC AUTO-ENTMCNC: 33.3 G/DL (ref 31.5–36.5)
MCV RBC AUTO: 93 FL (ref 78–100)
NITRATE UR QL: NEGATIVE
PH UR STRIP: 6.5 [PH] (ref 5–7)
PLATELET # BLD AUTO: 308 10E3/UL (ref 150–450)
RBC # BLD AUTO: 4.24 10E6/UL (ref 3.8–5.2)
RBC #/AREA URNS AUTO: ABNORMAL /HPF
SP GR UR STRIP: 1.02 (ref 1–1.03)
SQUAMOUS #/AREA URNS AUTO: ABNORMAL /LPF
UROBILINOGEN UR STRIP-ACNC: 0.2 E.U./DL
WBC # BLD AUTO: 11.6 10E3/UL (ref 4–11)
WBC #/AREA URNS AUTO: ABNORMAL /HPF

## 2024-11-26 PROCEDURE — 76801 OB US < 14 WKS SINGLE FETUS: CPT | Performed by: OBSTETRICS & GYNECOLOGY

## 2024-11-26 PROCEDURE — 85027 COMPLETE CBC AUTOMATED: CPT | Performed by: OBSTETRICS & GYNECOLOGY

## 2024-11-26 PROCEDURE — G2211 COMPLEX E/M VISIT ADD ON: HCPCS | Performed by: OBSTETRICS & GYNECOLOGY

## 2024-11-26 PROCEDURE — 87389 HIV-1 AG W/HIV-1&-2 AB AG IA: CPT | Performed by: OBSTETRICS & GYNECOLOGY

## 2024-11-26 PROCEDURE — 86901 BLOOD TYPING SEROLOGIC RH(D): CPT | Performed by: OBSTETRICS & GYNECOLOGY

## 2024-11-26 PROCEDURE — 76802 OB US < 14 WKS ADDL FETUS: CPT | Performed by: OBSTETRICS & GYNECOLOGY

## 2024-11-26 PROCEDURE — 86900 BLOOD TYPING SEROLOGIC ABO: CPT | Performed by: OBSTETRICS & GYNECOLOGY

## 2024-11-26 PROCEDURE — 99215 OFFICE O/P EST HI 40 MIN: CPT | Performed by: OBSTETRICS & GYNECOLOGY

## 2024-11-26 PROCEDURE — 87340 HEPATITIS B SURFACE AG IA: CPT | Performed by: OBSTETRICS & GYNECOLOGY

## 2024-11-26 PROCEDURE — 86780 TREPONEMA PALLIDUM: CPT | Performed by: OBSTETRICS & GYNECOLOGY

## 2024-11-26 PROCEDURE — 81001 URINALYSIS AUTO W/SCOPE: CPT | Performed by: OBSTETRICS & GYNECOLOGY

## 2024-11-26 PROCEDURE — 86762 RUBELLA ANTIBODY: CPT | Performed by: OBSTETRICS & GYNECOLOGY

## 2024-11-26 PROCEDURE — 87086 URINE CULTURE/COLONY COUNT: CPT | Performed by: OBSTETRICS & GYNECOLOGY

## 2024-11-26 PROCEDURE — 86803 HEPATITIS C AB TEST: CPT | Performed by: OBSTETRICS & GYNECOLOGY

## 2024-11-26 PROCEDURE — 86850 RBC ANTIBODY SCREEN: CPT | Performed by: OBSTETRICS & GYNECOLOGY

## 2024-11-26 PROCEDURE — 36415 COLL VENOUS BLD VENIPUNCTURE: CPT | Performed by: OBSTETRICS & GYNECOLOGY

## 2024-11-26 PROCEDURE — 83036 HEMOGLOBIN GLYCOSYLATED A1C: CPT | Performed by: OBSTETRICS & GYNECOLOGY

## 2024-11-26 NOTE — PROGRESS NOTES
SUBJECTIVE:      HPI:     This is a 38 year old female patient,  who presents for her first obstetrical visit.     LIBERTAD: 2025, by Last Menstrual Period.  She is 6w5d weeks.  Her cycles are regular.  Her last menstrual period was normal.   Since her LMP, she has experienced  nausea, fatigue, and spotting ).   She denies emesis, abdominal pain, headache, loss of appetite, vaginal discharge, dysuria, pelvic pain, urinary urgency, lightheadedness, urinary frequency, hemorrhoids, and constipation.     Additional History:   - 3rd baby  - AMA  - Hx c/s x2 - 1st pregnancy due to fetal intolerance, repeat c/s with 2nd  - IAB requiring D&C on 2024  - Hx anxiety and depression - no current medications or therapy. Pt states she was on a very low dose anxiety med many years ago, but was short term. Pt reports no concerns in mood at this time   - Pt reports she is still having some occasional spotting. Spotting has always been dark brown in color, notices occasionally with wiping. Did have one very small dot on underwear x1. She reports her spotting is much improved from when she called the clinic last week on 10/24 and spoke with nursing. She continued to monitor at home per MD recommendations. Denies any other vaginal/urinary s/s. Reviewed to call clinic if she is having bright red bleeding and/or pain, or fever/chills.     **New OB visit **  Had early miscarriage with D&C in January and then had fertility counseling visit with me in September.  Pregnant shortly thereafter!  Sure LMP and us today confirms dating with di-di twin gestation!!  Has had some brownish discharge over the last few weeks.  Never any bright red bleeding or spotting.  Minimal cramping.  Overall feeling pretty well --a bit more fatigue but minimal nausea/vomiting.  Sleep just okay --has started using unisom.  No bowel/bladder issues  AMA --interested in prequel testing today  Hx prior c/s x 2 --first done with me in 2019 for fetal intolerance  "to labor and 2nd done in   -declines flu and covid vaccines today     Have you travelled during the pregnancy?No  Have your sexual partner(s) travelled during the pregnancy?No        HISTORY:   Planned Pregnancy: Yes  Marital Status:   Occupation:  at [employer]  Living in Household: Spouse and Children     Past History:  Her past medical history   Past Medical History        Past Medical History:   Diagnosis Date    Anxiety      Depressive disorder       no meds    Insomnia      Motion sickness        .       She has a history of  prior  section and IAB with D&C  2024     Since her last LMP she denies use of alcohol, tobacco and street drugs.     Past medical, surgical, social and family history were reviewed and updated in [facility].     Confirmed patient desires delivery at [facility] with the [facility] provider.     Nurse phone visit completed. Prenatal book and folder (containing standard educational hand-outs and brochures) will be given at the next visit to patient. Information in folder reviewed over the phone. Questions answered. Brochure given on optional screening available to assess chromosomal anomalies. Pt desires NIPS/ordered. Pt advised to call the clinic if she has any questions or concerns related to her pregnancy. Prenatal labs future ordered. New prenatal visit scheduled on  with Dr. Jimenez.     Lashonda Orantes, RN on 10/29/2024 at 4:00 PM  [facility] Triage      No results found for: \"PAP\"     PHQ-9 score:         10/29/2024     3:34 PM   PHQ   PHQ-9 Total Score 0   Q9: Thoughts of better off dead/self-harm past 2 weeks Not at all              2021     2:32 PM 2024     1:47 PM 10/29/2024     3:34 PM   BREANNE-7 SCORE   Total Score 5 1 0         Patient supplied answers from flow sheet for:  Prenatal OB Questionnaire.  Past Medical History  Have you ever recieved care for your mental health? : No  Have you ever been in a " "major accident or suffered serious trauma?: No  Within the last year, has anyone hit, slapped, kicked or otherwise hurt you?: No  In the last year, has anyone forced you to have sex when you didn't want to?: No     Past Medical History 2   Have you ever received a blood transfusion?: No  Would you accept a blood transfusion if was medically recommended?: Yes  Does anyone in your home smoke?: No   Is your blood type Rh negative?: Unknown  Have you ever ?: (!) Yes  Have you been hospitalized for a nonsurgical reason excluding normal delivery?: No  Have you ever had an abnormal pap smear?: (!) Yes (Hx abnormal pap smear in college, prior to 2010; hx colpo then and PAPs normal since)     Past Medical History (Continued)  Do you have a history of abnormalities of the uterus?: No  Did your mother take YANDEL or any other hormones when she was pregnant with you?: Unknown  Do you have any other problems we have not asked about which you feel may be important to this pregnancy?: No     Current Facility-Administered Medications          Current Outpatient Medications   Medication Sig Dispense Refill    Prenatal Vit-Fe Fumarate-FA (PRENATAL MULTIVITAMIN  PLUS IRON) 27-1 MG TABS Take by mouth daily.          No current facility-administered medications for this visit.            ROS:   12 point review of systems negative other than symptoms noted below or in the HPI.  Constitutional: Fatigue  Gastrointestinal: Nausea        OBJECTIVE:     EXAM:  /70   Ht 1.702 m (5' 7\")   Wt 81.6 kg (180 lb)   LMP 09/12/2024   BMI 28.19 kg/m   Body mass index is 28.19 kg/m .    GENERAL: alert and no distress  EYES: Eyes grossly normal to inspection, PERRL and conjunctivae and sclerae normal  HENT: ear canals and TM's normal, nose and mouth without ulcers or lesions  NECK: no adenopathy, no asymmetry, masses, or scars  RESP: lungs clear to auscultation - no rales, rhonchi or wheezes  CV: regular rate and rhythm, normal S1 S2, no " S3 or S4, no murmur, click or rub, no peripheral edema  ABDOMEN: soft, nontender, no hepatosplenomegaly, no masses and bowel sounds normal  MS: no gross musculoskeletal defects noted, no edema  SKIN: no suspicious lesions or rashes  NEURO: Normal strength and tone, mentation intact and speech normal  PSYCH: mentation appears normal, affect normal/bright    ASSESSMENT/PLAN:       ICD-10-CM    1. Supervision of high risk elderly multigravida in first trimester  O09.521 Urine Culture Aerobic Bacterial     *UA reflex to Microscopic     Urine Microscopic Exam     ABO/Rh type and screen     Hepatitis B surface antigen     CBC with platelets     HIV Antigen Antibody Combo     Rubella Antibody IgG     Treponema Abs w Reflex to RPR and Titer     Hemoglobin A1c     Hepatitis C antibody     [place] Non-Invasive Prenatal Screening-Prequel     [facility] Referral - Pregnancy      2. Genetic screening  Z13.79 [facility]-Invasive Prenatal Screening-Prequel      3. Dichorionic diamniotic twin pregnancy in first trimester  O30.041 [facility] Referral - Pregnancy      4. Previous  delivery, antepartum  O34.219           39 year old , On 2024 patient is 10w5d weeks of pregnancy with LIBERTAD of 2025, by Last Menstrual Period    Discussed as follows:twin gestation; AMA status, prior c/s x 2    Counseling given:   - Follow up in 4-6 weeks for return OB visit.  - Recommended weight gain for pregnancy: > 35 lbs.      PLAN/PATIENT INSTRUCTIONS:    Patient Instructions   Avoid alcoholic beverages.  Discussed previous  section delivery x 2.  Discussed planned repeat  section in 38th week with twin gestation.   Discussed advanced maternal age and testing options.  Patient is above age 35 at delivery.  Explained risks of chromosomal abnormalities and age risks.  Discussed amnio versus non-invasive testing and their individual limitations and risks.  Patient would like non-invasive  testing to start with.  Understands Prequel is a screening test and not a diagnostic test.   Discussed limitations in sex determination with prequel if Y chromosomes detected.  Will also plan to check AFP at 15-20wks and arrange level II us with MFM at 18-20wks.  Discussed twin gestation!!  Discussed different types of twins and risks associated with each.  With di-di twins, discussed level II us with MFM as well as serial ultrasounds for growth in the third trimester.  Will start daily baby ASA at 12wks due to AMA and twin gestation.  Flu and covid vaccines declined today.   Reviewed usual and expected course of pregnancy including visits, labwork, imaging, vaccinations, etc.  RTC 4wks for routine OB visit.    45min spent on day of visit with chart review, imaging review, patient visit and counseling with extra time taken due to twin pregnancy; documentation and planning.     Brandee Jimenez MD

## 2024-11-26 NOTE — PATIENT INSTRUCTIONS
Avoid alcoholic beverages.  Discussed previous  section delivery x 2.  Discussed planned repeat  section in 38th week with twin gestation.   Discussed advanced maternal age and testing options.  Patient is above age 37 at delivery.  Explained risks of chromosomal abnormalities and age risks.  Discussed amnio versus non-invasive testing and their individual limitations and risks.  Patient would like non-invasive testing to start with.  Understands Prequel is a screening test and not a diagnostic test.   Discussed limitations in sex determination with prequel if Y chromosomes detected.  Will also plan to check AFP at 15-20wks and arrange level II us with MFM at 18-20wks.  Discussed twin gestation!!  Discussed different types of twins and risks associated with each.  With di-di twins, discussed level II us with MFM as well as serial ultrasounds for growth in the third trimester.  Will start daily baby ASA at 12wks due to AMA and twin gestation.  Flu and covid vaccines declined today.   Reviewed usual and expected course of pregnancy including visits, labwork, imaging, vaccinations, etc.  RTC 4wks for routine OB visit.

## 2024-11-27 ENCOUNTER — TRANSCRIBE ORDERS (OUTPATIENT)
Dept: MATERNAL FETAL MEDICINE | Facility: CLINIC | Age: 39
End: 2024-11-27
Payer: COMMERCIAL

## 2024-11-27 DIAGNOSIS — O26.90 PREGNANCY RELATED CONDITION, ANTEPARTUM: Primary | ICD-10-CM

## 2024-11-27 LAB
HBV SURFACE AG SERPL QL IA: NONREACTIVE
HCV AB SERPL QL IA: NONREACTIVE
HIV 1+2 AB+HIV1 P24 AG SERPL QL IA: NONREACTIVE
RUBV IGG SERPL QL IA: 3.4 INDEX
RUBV IGG SERPL QL IA: POSITIVE
T PALLIDUM AB SER QL: NONREACTIVE

## 2024-11-28 LAB
BACTERIA UR CULT: NORMAL
BACTERIA UR CULT: NORMAL

## 2024-12-09 LAB — SCANNED LAB RESULT: NORMAL

## 2025-01-16 ENCOUNTER — OFFICE VISIT (OUTPATIENT)
Dept: MATERNAL FETAL MEDICINE | Facility: CLINIC | Age: 40
End: 2025-01-16
Attending: OBSTETRICS & GYNECOLOGY
Payer: COMMERCIAL

## 2025-01-16 DIAGNOSIS — O30.042 DICHORIONIC DIAMNIOTIC TWIN PREGNANCY IN SECOND TRIMESTER: Primary | ICD-10-CM

## 2025-01-16 DIAGNOSIS — O09.521 SUPERVISION OF HIGH RISK ELDERLY MULTIGRAVIDA IN FIRST TRIMESTER: Primary | ICD-10-CM

## 2025-01-16 DIAGNOSIS — O26.90 PREGNANCY RELATED CONDITION, ANTEPARTUM: ICD-10-CM

## 2025-01-16 PROCEDURE — 96041 GENETIC COUNSELING SVC EA 30: CPT

## 2025-01-16 NOTE — NURSING NOTE
Patient presents to [facility] for GC/L2 US at 18w0d due to Di Di Twins and AMA. Denies LOF, vaginal bleeding, cramping/contractions. SBAR given to LASHANDA LOPEZ, see their note in Epic.

## 2025-01-16 NOTE — PROGRESS NOTES
Please see full imaging report from ViewPoint program under imaging tab.    Thank-you for referring your patient for a comprehensive twin anatomy ultrasound.    I discussed the findings on today's ultrasound with the patient. I reviewed the limitations of ultrasound both in detecting aneuploidy and structural abnormalities.  Ultrasound can routinely detect 80-90% of structural abnormalities. She had low risk cell free fetal DNA for genetic screening this pregnancy and a normal msAFP.    She reports being told at her last  birth that her lower uterine segment was thinner. This is not suspected at this time, but additional growth US for twins (which are anticipated every 4 weeks) may consider evaluating the lower uterine segment as well.     We recommend growth US every 4 weeks and weekly BPP at 36 weeks, with delivery at 38 weeks if no complications arise. We assume that her follow up US will be done with her primary OB team,but we are happy to see her back for US if preferred.     Return to primary provider for continued prenatal care.    If you have questions regarding today's evaluation or if we can be of further service, please contact the [facility].    **Fetal anomalies may be present but not detected**    I spent a total of 30 minutes (excluding the ultrasound interpretation) on the date of this encounter including preparing to see the patient (reviewing medical records/tests), in direct face-to-face contact with the patient counseling and discussing the plan of care, documenting the visit in the electronic medical record, and communicating with other health care professionals and/or care coordination.     Arlene Hernandez MD  Maternal Fetal Medicine

## 2025-01-16 NOTE — PROGRESS NOTES
[facility]  Genetic Counseling Consult    Patient:  Bridgett Brady YOB: 1985   Date of Service:  25   MRN: [id number]    Bridgett was seen at the [facility] [place] for genetic consultation. The indication for genetic counseling is advanced maternal age. The patient was accompanied to this visit by her , Eddie.    The session was conducted in English.        IMPRESSION/ PLAN   1. Bridgett had genetic screening earlier in this pregnancy. Her non-invasive prenatal test was screen negative or low risk for screened conditions. Bridgett also had low risk maternal serum AFP earlier in this pregnancy.    2. During today's [facility] visit, Bridgett had a genetic counseling session only. Bridgett has already had genetic testing in this pregnancy. Additional screening and diagnostic testing was discussed for the gestational age and declined.    3. Bridgett had a level II comprehensive anatomy ultrasound today. Please see the ultrasound report for further details and [facility] recommendations.    PREGNANCY HISTORY   /Parity:     No heavy bleeding, severe cramping, fevers, or exposures of concern were reported at today's visit. Bridgett's pregnancy history is significant for:   Two C-sections with her current partner  One first trimester SAB with her current partner    CURRENT PREGNANCY   Current Age: 39 year old   Age at Delivery: 39 year old  LIBERTAD: 2025, by Last Menstrual Period                                   Gestational Age: 18w0d  This pregnancy is a dichorionic diamniotic twin gestation.   Twin pregnancies are described by the number of placentas (-chorionic) and amniotic sacs (-amniotic). In addition, twin pregnancies are also characterized by the number (mono- or di-) of zygotes (fertilized egg) the pregnancy developed from.  This pregnancy is a dichorionic diamniotic twin pregnancy which means the babies have separate  "placentas and separate amniotic sacs. Due to being dichorionic, there is a 20-30% chance the twins are monozygotic, or genetically identical and a 70-80% chance the twins are dizygotic, or NOT geneticallyidentical. If the twins arediscordant in sex (one male, one female) they are most likely dizygotic. If they are the same sex, the zygosity is unclear. There is a screening option called cell-free DNA that can determine zygosity with certain technology.   This pregnancy was conceived spontaneously.    MEDICAL HISTORY   Bridgett s reported medical history is not expected to impact pregnancy management or risks to fetal development.       FAMILY HISTORY   A three-generation family history was obtained today and is scanned under the \"Media\" tab in Epic. The family history was reported by Bridgett and Eddie.    The following significant findings were reported today:   One of Bridgett's brother's sons has a history of severe epilepsy, developmental delay, and autism spectrum disorder. He reportedly has a chromosome abnormality affecting multiple chromosomes which Bridgett believes was inherited from his mother.   Based on the reported family history, it sounds like Bridgett's sister-in-law may have a balanced translocation. A reciprocol translocation is a structural rearrangement in which part of one chromosome is exchanged with another non-homologous chromosome. They can involve any chromosome and the sizes of the exchanged portions can be very small or very large. Balanced translocations occur when there is no gain or loss of material and, generally, carriers do not have any health concerns related to being a carrier. However, there is an increased chance of having a child with an unbalanced chromosome makeup and therefore health concerns. We reviewed that if Bridgett's sister-in-law does carry a balanced translocation, the chance for Bridgett and Eddie to have a child with similar concerns would be extremely low. However, if Bridgett's " nephew is found to have a difference genetic condition, the risk assessment can be revisited.   One of Eddie's brother's daughters has Lin-Hirschhorn. Eddie's brother and his wife reportedly underwent genetic testing which showed this was a de camron change in their daughter.  Lin-Hirschhorn syndrome is caused by a deletion of part of the long arm of chromosome 4. Affected individuals may have characteristic facial features (broad nasal bridge, large wide-set eyes, high forehead short philtrum). They typically also have delayed growth and development with reduced muscle tone. Intellectual disability is common, though social skills tend to be strong. Some affected individuals may also have skeletal anomalies, cleft palate with or without cleft lip, heart defects, and genitourinary differences. We reviewed that the recurrence risk for other relatives to have Lin-Hirschhorn is <1% given it was a de camron change.     Otherwise, the reported family history is unremarkable for multiple miscarriages, stillbirths, birth defects, intellectual disabilities, developmental delays, cancer diagnosed under 50, known genetic conditions, and consanguinity.       RISK ASSESSMENT FOR INHERITED CONDITIONS AND CARRIER SCREENING OPTIONS   Expanded carrier screening is available to screen for autosomal recessive conditions and X-linked conditions in a large list of genes. Carrier screening does not test the pregnancy but gives a risk assessment for the pregnancy and future pregnancies to have the condition. Expanded carrier screening is designed to identify carrier status for conditions that are primarily childhood or adolescent onset. Expanded carrier screening does not evaluate for adult-onset conditions such as hereditary cancer syndromes, dementia/Alzheimer's disease, or cardiovascular disease risk factors. Additionally, expanded carrier screening is not comprehensive for all known genetic diseases or inherited conditions. Carrier  screening does not test for all genetic and health conditions or risk factors.     Autosomal recessive conditions happen when a mutation has been inherited from the egg and sperm and include conditions like cystic fibrosis, thalassemia, hearing loss, spinal muscular atrophy, and more. We reviewed that when both biological parents carry a harmful genetic change in a gene associated with autosomal recessive inheritance, each of their pregnancies has a 1 in 4 (25%) chance to be affected by that condition. X-linked conditions happen when a mutation has been inherited from the egg and include conditions like fragile X syndrome.With X-linked conditions, the specific risk generally depends on the chromosomal sex of the fetus, with XY individuals (generally male) being most severely affected.      screening was reviewed. About [place] Scre[facility]    The patient does NOT have a family history of known autosomal recessive conditions. This does NOT mean the patient and/or their partner is not a carrier of a condition. Approximately 90% of couples at an increased reproductive risk for an inherited condition have no family history of that condition.     The patient has not had carrier screening previously. The cople declined the carrier screening options and declined a thorough discussion of the options. They are aware the option will remain, and they can contact us if they would like to pursue screening.    RISK ASSESSMENT FOR CHROMOSOME CONDITIONS   We explained that the risk for fetal chromosome abnormalities increases with maternal age. Bridgett had genetic screening earlier in this pregnancy. Her non-invasive prenatal test was screen negative or low risk for screened conditions.     Non-invasive prenatal testing (NIPT) results  Maternal plasma cell-free DNA testing  Screens for fetal trisomy 21, trisomy 13, trisomy 18, and sex chromosome aneuploidy  First trimester ultrasound with nuchal translucency and nasal bone  assessment was not performed in this pregnancy, to our knowledge.  Bridgett had a [facility] Prequel test earlier in pregnancy; we reviewed the results today, which are low risk.  Prequel cannot screen for sex chromosome aneuploidies in twin pregnancies but can predict fetal sex.  The predicted fetal sexes are male and female.  Given the accuracy of this test, these results greatly decrease the chance for certain fetal chromosome abnormalities  We discussed the limitations of normal NIPT results  Maternal serum AFP only to screen for open neural tube defects (after 15 weeks) results were within normal limits at a 2.49 MoM, which decreased the risk of an open neural tube defect to 1 in 751.     GENETIC TESTING OPTIONS FOR CHROMOSOMAL CONDITIONS   Genetic testing during a pregnancy includes screening and diagnostic procedures.      Screening tests are non-invasive which means no risk to the pregnancy and includes ultrasounds and blood work. The benefits and limitations of screening were reviewed. Screening tests provide a risk assessment (chance) specific to the pregnancy for certain fetal chromosome abnormalities but cannot definitively diagnose or exclude a fetal chromosome abnormality. Follow-up genetic counseling and consideration of diagnostic testing is recommended with any abnormal screening result. Diagnostic testing during a pregnancy is more certain and can test for more conditions. However, the tests do have a risk of miscarriage that requires careful consideration. These tests can detect fetal chromosome abnormalities with greater than 99% certainty. Results can be compromised by maternal cell contamination or mosaicism and are limited by the resolution of current genetic testing technology.     There is no screening or diagnostic test that detects all forms of birth defects or intellectual disability.     We discussed the following ultrasound options:  Comprehensive level II ultrasound (Fetal Anatomy  Ultrasound)  Ultrasound done between 18-20 weeks gestation  Screens for major birth defects and markers for aneuploidy (like trisomy 21 and trisomy 18)  Includes looking at the fetus/baby's growth, heart, organs (stomach, kidneys), placenta, and amniotic fluid    We discussed the following diagnostic options:   Amniocentesis  Invasive diagnostic procedure done after 15 weeks gestation  The procedure collects a small sample of amniotic fluid for the purpose of chromosomal testing and/or other genetic testing  Diagnostic result; more than 99% sensitivity for fetal chromosome abnormalities  Testing for AFP in the amniotic fluid can test for open neural tube defects           It was a pleasure to be involved with [facility] care. Time spent on the day of encounter was 45 minutes.    Cynthia Rai MS, [facility]  Maternal Fetal Medicine  Office: [phone]  [employer]: [phone]   Fax: [phone]  [facility]

## 2025-01-28 ENCOUNTER — PRENATAL OFFICE VISIT (OUTPATIENT)
Dept: OBGYN | Facility: CLINIC | Age: 40
End: 2025-01-28
Payer: COMMERCIAL

## 2025-01-28 VITALS — BODY MASS INDEX: 29.76 KG/M2 | SYSTOLIC BLOOD PRESSURE: 110 MMHG | DIASTOLIC BLOOD PRESSURE: 72 MMHG | WEIGHT: 190 LBS

## 2025-01-28 DIAGNOSIS — O09.522 SUPERVISION OF HIGH RISK ELDERLY MULTIGRAVIDA IN SECOND TRIMESTER: Primary | ICD-10-CM

## 2025-01-28 PROCEDURE — 99207 PR PRENATAL VISIT: CPT | Performed by: OBSTETRICS & GYNECOLOGY

## 2025-01-28 NOTE — PROGRESS NOTES
Doing well today.  +FM x 2  No ctx/cramping/vb/spotting  Overall feeling pretty well  No bowel/bladder issues  Just recovering from sinus infection --was treated with abx   Had normal level II us with MFM --will do serial growth us with them at 28, 32 and 36wks  Declines flu shot  RTC 4wks

## 2025-02-24 DIAGNOSIS — O09.522 SUPERVISION OF HIGH RISK ELDERLY MULTIGRAVIDA IN SECOND TRIMESTER: Primary | ICD-10-CM

## 2025-02-25 ENCOUNTER — PRENATAL OFFICE VISIT (OUTPATIENT)
Dept: OBGYN | Facility: CLINIC | Age: 40
End: 2025-02-25
Payer: COMMERCIAL

## 2025-02-25 VITALS — SYSTOLIC BLOOD PRESSURE: 116 MMHG | WEIGHT: 197 LBS | BODY MASS INDEX: 30.85 KG/M2 | DIASTOLIC BLOOD PRESSURE: 72 MMHG

## 2025-02-25 DIAGNOSIS — Z3A.23 23 WEEKS GESTATION OF PREGNANCY: ICD-10-CM

## 2025-02-25 DIAGNOSIS — O30.049 DICHORIONIC DIAMNIOTIC TWIN PREGNANCY, ANTEPARTUM: ICD-10-CM

## 2025-02-25 DIAGNOSIS — O09.522 SUPERVISION OF HIGH RISK ELDERLY MULTIGRAVIDA IN SECOND TRIMESTER: Primary | ICD-10-CM

## 2025-02-25 DIAGNOSIS — O34.219 PREVIOUS CESAREAN DELIVERY, ANTEPARTUM: ICD-10-CM

## 2025-02-25 PROCEDURE — 3078F DIAST BP <80 MM HG: CPT | Performed by: OBSTETRICS & GYNECOLOGY

## 2025-02-25 PROCEDURE — 3074F SYST BP LT 130 MM HG: CPT | Performed by: OBSTETRICS & GYNECOLOGY

## 2025-02-25 PROCEDURE — 0502F SUBSEQUENT PRENATAL CARE: CPT | Performed by: OBSTETRICS & GYNECOLOGY

## 2025-02-25 PROCEDURE — 99207 PR PRENATAL VISIT: CPT | Performed by: OBSTETRICS & GYNECOLOGY

## 2025-02-25 RX ORDER — ASPIRIN 81 MG/1
81 TABLET ORAL DAILY
COMMUNITY

## 2025-02-25 NOTE — PROGRESS NOTES
Doing well today.  +FM  No ctx/cramping/vb/spotting  Had hemorrhoid last week --now improved; discussed avoidance of constipation and safe OTC meds to use   Also having more congestion especially at night  Will have growth us with MFM on Thursday for twins --will also re-evaluate lower uterine segment given thinning noted at last c/s  RTC 4wks --glucola, CBC and Tdap at that time  NOT INTERESTED in BS at time of c/s --this will be last pregnancy but  to have vasectomy

## 2025-02-27 ENCOUNTER — HOSPITAL ENCOUNTER (OUTPATIENT)
Dept: ULTRASOUND IMAGING | Facility: CLINIC | Age: 40
End: 2025-02-27
Attending: STUDENT IN AN ORGANIZED HEALTH CARE EDUCATION/TRAINING PROGRAM
Payer: COMMERCIAL

## 2025-02-27 ENCOUNTER — OFFICE VISIT (OUTPATIENT)
Dept: MATERNAL FETAL MEDICINE | Facility: CLINIC | Age: 40
End: 2025-02-27
Attending: STUDENT IN AN ORGANIZED HEALTH CARE EDUCATION/TRAINING PROGRAM
Payer: COMMERCIAL

## 2025-02-27 DIAGNOSIS — O30.042 DICHORIONIC DIAMNIOTIC TWIN PREGNANCY IN SECOND TRIMESTER: Primary | ICD-10-CM

## 2025-02-27 DIAGNOSIS — O30.042 DICHORIONIC DIAMNIOTIC TWIN PREGNANCY IN SECOND TRIMESTER: ICD-10-CM

## 2025-02-27 DIAGNOSIS — O09.521 SUPERVISION OF HIGH RISK ELDERLY MULTIGRAVIDA IN FIRST TRIMESTER: ICD-10-CM

## 2025-02-27 PROCEDURE — 76816 OB US FOLLOW-UP PER FETUS: CPT | Mod: 59

## 2025-02-27 NOTE — NURSING NOTE
Patient presents to [facility] for RL2 at 24w0d due to Di Di twins. Positive fetal movement. Denies LOF, vaginal bleeding or cramping/contractions. SBAR given to [facility], see their note in Epic.    Written by Ishaan Burleson, as dictated by Lee Ann Fenton MD.     ASSESSMENT and PLAN  Diagnoses and all orders for this visit:    1. Controlled type 2 diabetes mellitus without complication, without long-term current use of insulin ([address]  Continue with metformin 500mg daily. Pt will have updated lab work performed during today's OV. I advised her to get regular exercise. -      DIABETES FOOT EXAM  -     HEMOGLOBIN A1C WITH EAG; Future  -     METABOLIC PANEL, COMPREHENSIVE; Future  -     CBC W/O DIFF; Future  -     MICROALBUMIN, UR, RAND W/ MICROALB/CREAT RATIO; Future    2. Acquired hypothyroidism  Continue with levothyroxine 88mcg as directed. Will recheck TSH.   -     TSH 3RD GENERATION; Future    3. Other fatigue  I advised pt to try taking her BP medication in the evening instead of the morning to see if that helps her fatigue. She may also be feeling tired because she stopped taking Vit B12. I recommended she take OTC Vit B12 supplement. She will also continue with Vit D3 supplement. Will also check some labs today. 4. Essential hypertension  Continue with lisinopril 10mg daily. 5. Mixed hyperlipidemia  Continue with pravastatin 40mg daily. Recheck lipid panel.   -     LIPID PANEL; Future    HISTORY OF PRESENT ILLNESS  Zachary Mcclure is a 79 y.o. female. Here today for fatigue. She is fasting today. Pt reports she is planning to get  in May. Pt c/o fatigue. She stopped taking Vit B12 when her leg cramps resolved. BP today is 106/71. She has been checking her BP at work and it has been good. Pt is taking lisinopril 10mg daily in morning. Pt is taking pravastatin 40mg daily. TSH was 0.76 on 11/28/22. Pt is taking levothyroxine 88mcg as directed. A1c was 6.6 on 11/28/22. Pt takes metformin 500mg daily with dinner. She monitors glucose at home. It is always higher in th morning.     Patient Active Problem List   Diagnosis Code    Hypothyroidism E03.9    Anemia D64.9 Hyperlipidemia E78.5    Essential hypertension I10    Well controlled diabetes mellitus ([facility] [address] E11.9    Multiple food allergies Z91.018    Gastroesophageal reflux disease without esophagitis K21.9        Current Outpatient Medications on File Prior to Visit   Medication Sig Dispense Refill    metFORMIN ER (GLUCOPHAGE XR) 500 mg tablet TAKE ONE TABLET BY MOUTH ONE TIME DAILY WITH DINNER 90 Tablet 0    lisinopriL (PRINIVIL, ZESTRIL) 10 mg tablet TAKE ONE TABLET BY MOUTH ONE TIME DAILY 90 Tablet 0    pravastatin (PRAVACHOL) 40 mg tablet TAKE ONE TABLET BY MOUTH ONE TIME DAILY 90 Tablet 0    calcium-cholecalciferol, d3, (CALCIUM 600 + D) 600-125 mg-unit tab Take  by mouth. Blood-Glucose Meter monitoring kit Please use four times a day and as needed. Dx. E11.9 1 Kit 0    glucose blood VI test strips (BLOOD GLUCOSE TEST) strip Use to test blood sugars 3 times a day and as needed DX. E11.9 100 Strip 6    Lancets misc Use to check blood sugars 3 times daily and as needed 200 Each 11    aspirin delayed-release 81 mg tablet Take 81 mg by mouth daily. levothyroxine (SYNTHROID) 88 mcg tablet TAKE ONE TABLET BY MOUTH EVERY MORNING BEFORE BREAKFAST 90 Tablet 1    OTHER tumeric (Patient not taking: Reported on 3/22/2023)      coenzyme q10 10 mg cap Take  by mouth. (Patient not taking: No sig reported)       No current facility-administered medications on file prior to visit.        No Known Allergies    Past Medical History:   Diagnosis Date    Anemia     Cervical herniated disc     High cholesterol     Thyroid disease     tyroid nodule removed 1998       Past Surgical History:   Procedure Laterality Date    HX HEENT      MS THYROIDECTOMY TOTAL/COMPLETE         Family History   Problem Relation Age of Onset    Diabetes Mother     Hypertension Mother     High Cholesterol Mother     Diabetes Sister     Hypertension Sister     Stroke Sister     Diabetes Brother     Hypertension Brother     Diabetes Maternal Grandmother Heart Disease Maternal Grandmother        Social History     Socioeconomic History    Marital status:      Spouse name: Not on file    Number of children: Not on file    Years of education: Not on file    Highest education level: Not on file   Occupational History    Not on file   Tobacco Use    Smoking status: Former     Packs/day: 0.50     Years: 30.00     Pack years: 15.00     Types: Cigarettes     Quit date: 3/1/2019     Years since quittin.0    Smokeless tobacco: Never   Vaping Use    Vaping Use: Never used   Substance and Sexual Activity    Alcohol use: No    Drug use: No    Sexual activity: Yes   Other Topics Concern    Not on file   Social History Narrative    Not on file     Social Determinants of Health     Financial Resource Strain: Medium Risk    Difficulty of Paying Living Expenses: Somewhat hard   Food Insecurity: Food Insecurity Present    Worried About Running Out of Food in the Last Year: Never true    Ran Out of Food in the Last Year: Sometimes true   Transportation Needs: Not on file   Physical Activity: Not on file   Stress: Not on file   Social Connections: Not on file   Intimate Partner Violence: Not on file   Housing Stability: Not on file       No visits with results within 3 Month(s) from this visit. Latest known visit with results is:   Orders Only on 2022   Component Date Value Ref Range Status    Hemoglobin A1c 2022 6.6 (A)  4.0 - 5.6 % Final    Comment: NEW METHOD  PLEASE NOTE NEW REFERENCE RANGE  (NOTE)  HbA1C Interpretive Ranges  <5.7              Normal  5.7 - 6.4         Consider Prediabetes  >6.5              Consider Diabetes      Est. average glucose 2022 143  mg/dL Final    TSH 2022 0.76  0.36 - 3.74 uIU/mL Final    Comment:   Due to TSH heterogeneity, both structurally and degree of glycosylation, monoclonal antibodies used in the TSH assay may not accurately quantitate TSH.  Therefore, this result should be correlated with clinical findings as well as with other assessments of thyroid function, e.g., free T4, free T3. Cholesterol, total 11/28/2022 203 (A)  <200 MG/DL Final    Triglyceride 11/28/2022 132  <150 MG/DL Final    Based on NCEP-ATP III:  Triglycerides <150 mg/dL  is considered normal, 150-199 mg/dL  borderline high,  200-499 mg/dL high and  greater than or equal to 500 mg/dL very high. HDL Cholesterol 11/28/2022 76  MG/DL Final    Based on NCEP ATP III, HDL Cholesterol <40 mg/dL is considered low and >60 mg/dL is elevated.     LDL, calculated 11/28/2022 100.6 (A)  0 - 100 MG/DL Final    Comment: Based on the NCEP-ATP: LDL-C concentrations are considered  optimal <100 mg/dL,  near optimal/above Normal 100-129 mg/dL  Borderline High: 130-159, High: 160-189 mg/dL  Very High: Greater than or equal to 190 mg/dL      VLDL, calculated 11/28/2022 26.4  MG/DL Final    CHOL/HDL Ratio 11/28/2022 2.7  0.0 - 5.0   Final    WBC 11/28/2022 4.1  3.6 - 11.0 K/uL Final    RBC 11/28/2022 4.32  3.80 - 5.20 M/uL Final    HGB 11/28/2022 12.9  11.5 - 16.0 g/dL Final    HCT 11/28/2022 41.7  35.0 - 47.0 % Final    MCV 11/28/2022 96.5  80.0 - 99.0 FL Final    MCH 11/28/2022 29.9  26.0 - 34.0 PG Final    MCHC 11/28/2022 30.9  30.0 - 36.5 g/dL Final    RDW 11/28/2022 13.7  11.5 - 14.5 % Final    PLATELET 86/15/7433 360  150 - 400 K/uL Final    MPV 11/28/2022 10.3  8.9 - 12.9 FL Final    NRBC 11/28/2022 0.0  0  WBC Final    ABSOLUTE NRBC 11/28/2022 0.00  0.00 - 0.01 K/uL Final    Sodium 11/28/2022 138  136 - 145 mmol/L Final    Potassium 11/28/2022 4.4  3.5 - 5.1 mmol/L Final    Chloride 11/28/2022 103  97 - 108 mmol/L Final    CO2 11/28/2022 29  21 - 32 mmol/L Final    Anion gap 11/28/2022 6  5 - 15 mmol/L Final    Glucose 11/28/2022 104 (A)  65 - 100 mg/dL Final    BUN 11/28/2022 15  6 - 20 MG/DL Final    Creatinine 11/28/2022 0.79  0.55 - 1.02 MG/DL Final    BUN/Creatinine ratio 11/28/2022 19  12 - 20   Final    eGFR 11/28/2022 >60  >60 ml/min/1.73m2 Final    Comment:   Pediatric calculator link: Federico.at. org/professionals/kdoqi/gfr_calculatorped    Effective Oct 3, 2022    These results are not intended for use in patients <25years of age. eGFR results are calculated without a race factor using  the 2021 CKD-EPI equation. Careful clinical correlation is recommended, particularly when comparing to results calculated using previous equations. The CKD-EPI equation is less accurate in patients with extremes of muscle mass, extra-renal metabolism of creatinine, excessive creatine ingestion, or following therapy that affects renal tubular secretion. Calcium 11/28/2022 9.4  8.5 - 10.1 MG/DL Final    Bilirubin, total 11/28/2022 0.6  0.2 - 1.0 MG/DL Final    ALT (SGPT) 11/28/2022 24  12 - 78 U/L Final    AST (SGOT) 11/28/2022 11 (A)  15 - 37 U/L Final    Alk. phosphatase 11/28/2022 78  45 - 117 U/L Final    Protein, total 11/28/2022 7.5  6.4 - 8.2 g/dL Final    Albumin 11/28/2022 4.2  3.5 - 5.0 g/dL Final    Globulin 11/28/2022 3.3  2.0 - 4.0 g/dL Final    A-G Ratio 11/28/2022 1.3  1.1 - 2.2   Final       Review of Systems   Constitutional:  Positive for malaise/fatigue. Negative for weight loss. HENT:  Negative for congestion and sore throat. Eyes:  Negative for blurred vision. Respiratory:  Negative for cough and shortness of breath. Cardiovascular:  Negative for chest pain and leg swelling. Gastrointestinal:  Negative for constipation and heartburn. Genitourinary:  Negative for frequency and urgency. Musculoskeletal:  Negative for back pain, joint pain and myalgias. Neurological:  Negative for dizziness and headaches. Psychiatric/Behavioral:  Negative for depression. The patient is not nervous/anxious and does not have insomnia.     Visit Vitals  /71 (BP 1 Location: Left upper arm, BP Patient Position: Sitting)   Pulse 89   Temp 97.1 °F (36.2 °C) (Temporal)   Resp 16   Ht 5' (1.524 m)   Wt 158 lb (71.7 kg)   SpO2 100% BMI 30.86 kg/m²   '  Physical Exam  Vitals and nursing note reviewed. Constitutional:       General: She is not in acute distress. Appearance: Normal appearance. She is not diaphoretic. HENT:      Right Ear: External ear normal.      Left Ear: External ear normal.   Eyes:      General: No scleral icterus. Right eye: No discharge. Left eye: No discharge. Extraocular Movements: Extraocular movements intact. Conjunctiva/sclera: Conjunctivae normal.   Cardiovascular:      Rate and Rhythm: Normal rate and regular rhythm. Pulmonary:      Effort: Pulmonary effort is normal.      Breath sounds: Normal breath sounds. No wheezing. Abdominal:      General: Bowel sounds are normal.      Palpations: Abdomen is soft. Musculoskeletal:      Cervical back: Normal range of motion and neck supple. Lymphadenopathy:      Cervical: No cervical adenopathy. Neurological:      Mental Status: She is alert and oriented to person, place, and time.    Psychiatric:         Mood and Affect: Mood and affect normal.     Diabetic foot exam:     Left: Reflexes 2+     Proprioception normal    Filament test normal sensation with micro filament   Pulse DP: 2+ (normal)   Pulse PT: 2+ (normal)   Deformities: None  Right: Reflexes 2+              Proprioception normal   Filament test normal sensation with micro filament   Pulse DP: 2+ (normal)   Pulse PT: 2+ (normal)   Deformities: None

## 2025-02-27 NOTE — PROGRESS NOTES
"Please see \"Imaging\" tab under \"Chart Review\" for details of today's visit.    Latoya Stanford    "

## 2025-03-08 ENCOUNTER — HEALTH MAINTENANCE LETTER (OUTPATIENT)
Age: 40
End: 2025-03-08

## 2025-03-26 ENCOUNTER — PRENATAL OFFICE VISIT (OUTPATIENT)
Dept: OBGYN | Facility: CLINIC | Age: 40
End: 2025-03-26
Payer: COMMERCIAL

## 2025-03-26 ENCOUNTER — LAB (OUTPATIENT)
Dept: LAB | Facility: CLINIC | Age: 40
End: 2025-03-26
Payer: COMMERCIAL

## 2025-03-26 VITALS — DIASTOLIC BLOOD PRESSURE: 74 MMHG | BODY MASS INDEX: 31.32 KG/M2 | WEIGHT: 200 LBS | SYSTOLIC BLOOD PRESSURE: 122 MMHG

## 2025-03-26 DIAGNOSIS — O09.522 SUPERVISION OF HIGH RISK ELDERLY MULTIGRAVIDA IN SECOND TRIMESTER: ICD-10-CM

## 2025-03-26 DIAGNOSIS — O34.219 PREVIOUS CESAREAN DELIVERY, ANTEPARTUM: ICD-10-CM

## 2025-03-26 DIAGNOSIS — Z23 NEED FOR TDAP VACCINATION: ICD-10-CM

## 2025-03-26 DIAGNOSIS — O30.049 DICHORIONIC DIAMNIOTIC TWIN PREGNANCY, ANTEPARTUM: ICD-10-CM

## 2025-03-26 DIAGNOSIS — O09.522 SUPERVISION OF HIGH RISK ELDERLY MULTIGRAVIDA IN SECOND TRIMESTER: Primary | ICD-10-CM

## 2025-03-26 DIAGNOSIS — Z3A.27 27 WEEKS GESTATION OF PREGNANCY: ICD-10-CM

## 2025-03-26 LAB
ERYTHROCYTE [DISTWIDTH] IN BLOOD BY AUTOMATED COUNT: 13.2 % (ref 10–15)
GLUCOSE 1H P 50 G GLC PO SERPL-MCNC: 101 MG/DL (ref 70–129)
HCT VFR BLD AUTO: 33.4 % (ref 35–47)
HGB BLD-MCNC: 11.2 G/DL (ref 11.7–15.7)
MCH RBC QN AUTO: 33.2 PG (ref 26.5–33)
MCHC RBC AUTO-ENTMCNC: 33.5 G/DL (ref 31.5–36.5)
MCV RBC AUTO: 99 FL (ref 78–100)
PLATELET # BLD AUTO: 250 10E3/UL (ref 150–450)
RBC # BLD AUTO: 3.37 10E6/UL (ref 3.8–5.2)
WBC # BLD AUTO: 12 10E3/UL (ref 4–11)

## 2025-03-26 PROCEDURE — 90715 TDAP VACCINE 7 YRS/> IM: CPT | Performed by: OBSTETRICS & GYNECOLOGY

## 2025-03-26 PROCEDURE — 99207 PR PRENATAL VISIT: CPT | Performed by: OBSTETRICS & GYNECOLOGY

## 2025-03-26 PROCEDURE — 36415 COLL VENOUS BLD VENIPUNCTURE: CPT

## 2025-03-26 PROCEDURE — 3074F SYST BP LT 130 MM HG: CPT | Performed by: OBSTETRICS & GYNECOLOGY

## 2025-03-26 PROCEDURE — 85027 COMPLETE CBC AUTOMATED: CPT

## 2025-03-26 PROCEDURE — 0502F SUBSEQUENT PRENATAL CARE: CPT | Performed by: OBSTETRICS & GYNECOLOGY

## 2025-03-26 PROCEDURE — 3078F DIAST BP <80 MM HG: CPT | Performed by: OBSTETRICS & GYNECOLOGY

## 2025-03-26 PROCEDURE — 82950 GLUCOSE TEST: CPT

## 2025-03-26 PROCEDURE — 90471 IMMUNIZATION ADMIN: CPT | Performed by: OBSTETRICS & GYNECOLOGY
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
## 2025-03-27 ENCOUNTER — OFFICE VISIT (OUTPATIENT)
Dept: MATERNAL FETAL MEDICINE | Facility: CLINIC | Age: 40
End: 2025-03-27
Attending: STUDENT IN AN ORGANIZED HEALTH CARE EDUCATION/TRAINING PROGRAM
Payer: COMMERCIAL

## 2025-03-27 ENCOUNTER — HOSPITAL ENCOUNTER (OUTPATIENT)
Dept: ULTRASOUND IMAGING | Facility: CLINIC | Age: 40
Discharge: HOME | End: 2025-03-27
Attending: STUDENT IN AN ORGANIZED HEALTH CARE EDUCATION/TRAINING PROGRAM
Payer: COMMERCIAL

## 2025-03-27 DIAGNOSIS — O30.042 DICHORIONIC DIAMNIOTIC TWIN PREGNANCY IN SECOND TRIMESTER: Primary | ICD-10-CM

## 2025-03-27 DIAGNOSIS — O09.521 SUPERVISION OF HIGH RISK ELDERLY MULTIGRAVIDA IN FIRST TRIMESTER: ICD-10-CM

## 2025-03-27 PROCEDURE — 76816 OB US FOLLOW-UP PER FETUS: CPT | Mod: 59

## 2025-03-27 NOTE — NURSING NOTE
Patient presents to [facility] for RL2 at 28w0d due to Di Di Twins, AMA, thin lower uterine segment due to previous C/S x 2. Positive fetal movement x 2. Denies LOF, vaginal bleeding or cramping/contractions. SBAR given to [facility], see their note in Epic.

## 2025-03-28 PROBLEM — O30.049 DICHORIONIC DIAMNIOTIC TWIN PREGNANCY, ANTEPARTUM: Status: ACTIVE | Noted: 2025-03-28

## 2025-04-08 ENCOUNTER — PRENATAL OFFICE VISIT (OUTPATIENT)
Dept: OBGYN | Facility: CLINIC | Age: 40
End: 2025-04-08
Payer: COMMERCIAL

## 2025-04-08 VITALS — SYSTOLIC BLOOD PRESSURE: 120 MMHG | DIASTOLIC BLOOD PRESSURE: 72 MMHG | BODY MASS INDEX: 31.48 KG/M2 | WEIGHT: 201 LBS

## 2025-04-08 DIAGNOSIS — O09.521 SUPERVISION OF HIGH RISK ELDERLY MULTIGRAVIDA IN FIRST TRIMESTER: Primary | ICD-10-CM

## 2025-04-08 DIAGNOSIS — Z3A.29 29 WEEKS GESTATION OF PREGNANCY: ICD-10-CM

## 2025-04-08 DIAGNOSIS — O30.049 DICHORIONIC DIAMNIOTIC TWIN PREGNANCY, ANTEPARTUM: ICD-10-CM

## 2025-04-08 PROCEDURE — 3078F DIAST BP <80 MM HG: CPT | Performed by: OBSTETRICS & GYNECOLOGY

## 2025-04-08 PROCEDURE — 3074F SYST BP LT 130 MM HG: CPT | Performed by: OBSTETRICS & GYNECOLOGY

## 2025-04-08 PROCEDURE — 99207 PR PRENATAL VISIT: CPT | Performed by: OBSTETRICS & GYNECOLOGY

## 2025-04-08 PROCEDURE — 0502F SUBSEQUENT PRENATAL CARE: CPT | Performed by: OBSTETRICS & GYNECOLOGY

## 2025-04-08 NOTE — PROGRESS NOTES
Doing well today.  +FM x 2  Occ cramping but nothing painful or worrisome  No vb/spotting  Good appetite; no bowel/bladder issues  Breathing more difficult --especially at night  Last MFM growth us (3/27) A: vtx, EFW 2-14# and B: tx, EFW 2-8#  RLTCS scheduled with me 6/5  PTL precautions reviewed  RTC 2wks

## 2025-04-21 ENCOUNTER — TELEPHONE (OUTPATIENT)
Dept: OBGYN | Facility: CLINIC | Age: 40
End: 2025-04-21
Payer: COMMERCIAL

## 2025-04-21 ENCOUNTER — OFFICE VISIT (OUTPATIENT)
Dept: MATERNAL FETAL MEDICINE | Facility: CLINIC | Age: 40
End: 2025-04-21
Attending: OBSTETRICS & GYNECOLOGY
Payer: COMMERCIAL

## 2025-04-21 ENCOUNTER — HOSPITAL ENCOUNTER (OUTPATIENT)
Dept: ULTRASOUND IMAGING | Facility: CLINIC | Age: 40
Discharge: HOME | End: 2025-04-21
Attending: OBSTETRICS & GYNECOLOGY
Payer: COMMERCIAL

## 2025-04-21 DIAGNOSIS — O34.219 HISTORY OF CESAREAN DELIVERY, CURRENTLY PREGNANT: ICD-10-CM

## 2025-04-21 DIAGNOSIS — O09.521 SUPERVISION OF HIGH RISK ELDERLY MULTIGRAVIDA IN FIRST TRIMESTER: ICD-10-CM

## 2025-04-21 DIAGNOSIS — O30.043 DICHORIONIC DIAMNIOTIC TWIN PREGNANCY IN THIRD TRIMESTER: Primary | ICD-10-CM

## 2025-04-21 DIAGNOSIS — O30.042 DICHORIONIC DIAMNIOTIC TWIN PREGNANCY IN SECOND TRIMESTER: ICD-10-CM

## 2025-04-21 PROCEDURE — 99213 OFFICE O/P EST LOW 20 MIN: CPT | Mod: 25 | Performed by: OBSTETRICS & GYNECOLOGY

## 2025-04-21 PROCEDURE — 76816 OB US FOLLOW-UP PER FETUS: CPT | Mod: 26 | Performed by: OBSTETRICS & GYNECOLOGY

## 2025-04-21 PROCEDURE — 76816 OB US FOLLOW-UP PER FETUS: CPT | Mod: 59

## 2025-04-21 NOTE — TELEPHONE ENCOUNTER
Caller reporting the following red-flag symptom(s): pregnant pt with painful hemorrhoids.    Per the system red-flag symptom policy, patient was instructed to:  speak with a Registered Nurse    Action:  Sending message to clinic - no answer on triage or secure chat. Pt opted for message and call back. Please call pt Thank you

## 2025-04-21 NOTE — NURSING NOTE
Patient presents to [facility] for RL2 at 31w4d due to Di Di twins - growth. Positive fetal movement. Denies LOF, vaginal bleeding or cramping/contractions. SBAR given to LASHANDA [facility]OPE[facility], see their note in Epic.

## 2025-04-21 NOTE — PROGRESS NOTES
The patient was seen for an ultrasound in the [facility]etal [facility] at the [facility] today.  For a detailed report of the ultrasound examination, please see the ultrasound report which can be found under the imaging tab.    If you have questions regarding today's evaluation or if we can be of further service, please contact the Maternal-Fetal Medicine Center.    Elaine Rizo MD  , OB/GYN  Maternal-Fetal Medicine  [phone] (Pager)

## 2025-04-22 ENCOUNTER — PRENATAL OFFICE VISIT (OUTPATIENT)
Dept: OBGYN | Facility: CLINIC | Age: 40
End: 2025-04-22
Payer: COMMERCIAL

## 2025-04-22 VITALS — WEIGHT: 206 LBS | BODY MASS INDEX: 32.26 KG/M2 | SYSTOLIC BLOOD PRESSURE: 126 MMHG | DIASTOLIC BLOOD PRESSURE: 74 MMHG

## 2025-04-22 DIAGNOSIS — N81.89 PELVIC FLOOR WEAKNESS: ICD-10-CM

## 2025-04-22 DIAGNOSIS — O30.049 DICHORIONIC DIAMNIOTIC TWIN PREGNANCY, ANTEPARTUM: ICD-10-CM

## 2025-04-22 DIAGNOSIS — K64.5 HEMORRHOIDS, THROMBOSED: ICD-10-CM

## 2025-04-22 DIAGNOSIS — O09.521 SUPERVISION OF HIGH RISK ELDERLY MULTIGRAVIDA IN FIRST TRIMESTER: Primary | ICD-10-CM

## 2025-04-22 DIAGNOSIS — Z3A.31 31 WEEKS GESTATION OF PREGNANCY: ICD-10-CM

## 2025-04-22 DIAGNOSIS — O34.219 PREVIOUS CESAREAN DELIVERY, ANTEPARTUM: ICD-10-CM

## 2025-04-22 PROCEDURE — 0502F SUBSEQUENT PRENATAL CARE: CPT | Performed by: OBSTETRICS & GYNECOLOGY

## 2025-04-22 PROCEDURE — 3078F DIAST BP <80 MM HG: CPT | Performed by: OBSTETRICS & GYNECOLOGY

## 2025-04-22 PROCEDURE — 99207 PR PRENATAL VISIT: CPT | Performed by: OBSTETRICS & GYNECOLOGY

## 2025-04-22 PROCEDURE — 3074F SYST BP LT 130 MM HG: CPT | Performed by: OBSTETRICS & GYNECOLOGY

## 2025-04-22 NOTE — PROGRESS NOTES
Doing well today.  +FM x 2  Fewer contractions/cramping; no vb/spotting/lof  Had thrombosed hemorrhoid --seen in urgent care yesterday.  Started to bleed last night and pressure/pain signficantly improved--starting a topical cream today; GI numbers given  Would like PT referral per the recommendation of her mother --will arrange for after delivery  Growth us with MFM yesterday; A, vtx, EFW 1934g/4-4# and B, BR, EFW 1797g/3-15#; will repeat growth scan and weekly BPP at 36wks with MFM  RTC 2wks

## 2025-05-01 ENCOUNTER — MYC MEDICAL ADVICE (OUTPATIENT)
Dept: OBGYN | Facility: CLINIC | Age: 40
End: 2025-05-01
Payer: COMMERCIAL

## 2025-05-01 NOTE — TELEPHONE ENCOUNTER
Type of Paperwork received: FMLA     Date Rcvd:  5/1/2025    Rcvd From (Company name): [employer]    Telephone/Fax number to fax forms to: [phone]    Provider:  Dr. Jimenez    Placed on Provider Cart Date:  5/1/2025

## 2025-05-07 ENCOUNTER — PRENATAL OFFICE VISIT (OUTPATIENT)
Dept: OBGYN | Facility: CLINIC | Age: 40
End: 2025-05-07
Payer: COMMERCIAL

## 2025-05-07 VITALS — WEIGHT: 208 LBS | DIASTOLIC BLOOD PRESSURE: 74 MMHG | BODY MASS INDEX: 32.58 KG/M2 | SYSTOLIC BLOOD PRESSURE: 122 MMHG

## 2025-05-07 DIAGNOSIS — O34.219 PREVIOUS CESAREAN DELIVERY, ANTEPARTUM: ICD-10-CM

## 2025-05-07 DIAGNOSIS — Z3A.33 33 WEEKS GESTATION OF PREGNANCY: ICD-10-CM

## 2025-05-07 DIAGNOSIS — O09.523 SUPERVISION OF HIGH RISK ELDERLY MULTIGRAVIDA IN THIRD TRIMESTER: Primary | ICD-10-CM

## 2025-05-07 DIAGNOSIS — O30.049 DICHORIONIC DIAMNIOTIC TWIN PREGNANCY, ANTEPARTUM: ICD-10-CM

## 2025-05-07 PROCEDURE — 3078F DIAST BP <80 MM HG: CPT | Performed by: OBSTETRICS & GYNECOLOGY

## 2025-05-07 PROCEDURE — 3074F SYST BP LT 130 MM HG: CPT | Performed by: OBSTETRICS & GYNECOLOGY

## 2025-05-07 PROCEDURE — 0502F SUBSEQUENT PRENATAL CARE: CPT | Performed by: OBSTETRICS & GYNECOLOGY

## 2025-05-07 PROCEDURE — 99207 PR PRENATAL VISIT: CPT | Performed by: OBSTETRICS & GYNECOLOGY

## 2025-05-07 NOTE — PROGRESS NOTES
Doing well but getting quite tired  +FM x 2  Occ tightening but nothing painful or consistent; band of lower cramping most days  No vb/spotting/lof  Eating/drinking well  No bowel/bladder issues;  still has hemorrhoid but not inflamed; will continue daily stool softener until delivery  Repeat growth us with MFM next week  RTC 2wks --viviana VEGA at that time

## 2025-05-19 ENCOUNTER — HOSPITAL ENCOUNTER (OUTPATIENT)
Dept: ULTRASOUND IMAGING | Facility: CLINIC | Age: 40
Discharge: HOME | End: 2025-05-19
Attending: OBSTETRICS & GYNECOLOGY
Payer: COMMERCIAL

## 2025-05-19 ENCOUNTER — OFFICE VISIT (OUTPATIENT)
Dept: MATERNAL FETAL MEDICINE | Facility: CLINIC | Age: 40
End: 2025-05-19
Attending: OBSTETRICS & GYNECOLOGY
Payer: COMMERCIAL

## 2025-05-19 DIAGNOSIS — O09.521 SUPERVISION OF HIGH RISK ELDERLY MULTIGRAVIDA IN FIRST TRIMESTER: ICD-10-CM

## 2025-05-19 DIAGNOSIS — O30.043 DICHORIONIC DIAMNIOTIC TWIN PREGNANCY IN THIRD TRIMESTER: Primary | ICD-10-CM

## 2025-05-19 DIAGNOSIS — O30.043 DICHORIONIC DIAMNIOTIC TWIN PREGNANCY IN THIRD TRIMESTER: ICD-10-CM

## 2025-05-19 PROCEDURE — 76816 OB US FOLLOW-UP PER FETUS: CPT | Mod: 59

## 2025-05-19 NOTE — NURSING NOTE
Patient reports positive fetal movement, no pain, no regular contractions, leaking of fluid, or bleeding.  Has BPP's scheduled with [facility] starting next week. SBAR given to [facility]AN[facility]OPE[facility], see their note in Epic.

## 2025-05-19 NOTE — PROGRESS NOTES
Please see full imaging report from [employer] program under imaging tab.    Arlene Hernandez MD  Maternal Fetal Medicine

## 2025-05-21 ENCOUNTER — MYC MEDICAL ADVICE (OUTPATIENT)
Dept: OBGYN | Facility: CLINIC | Age: 40
End: 2025-05-21

## 2025-05-21 ENCOUNTER — PRENATAL OFFICE VISIT (OUTPATIENT)
Dept: OBGYN | Facility: CLINIC | Age: 40
End: 2025-05-21
Payer: COMMERCIAL

## 2025-05-21 VITALS — WEIGHT: 213.2 LBS | BODY MASS INDEX: 33.39 KG/M2 | SYSTOLIC BLOOD PRESSURE: 118 MMHG | DIASTOLIC BLOOD PRESSURE: 66 MMHG

## 2025-05-21 DIAGNOSIS — O09.521 SUPERVISION OF HIGH RISK ELDERLY MULTIGRAVIDA IN FIRST TRIMESTER: Primary | ICD-10-CM

## 2025-05-21 DIAGNOSIS — O34.219 PREVIOUS CESAREAN DELIVERY, ANTEPARTUM: ICD-10-CM

## 2025-05-21 DIAGNOSIS — Z3A.35 35 WEEKS GESTATION OF PREGNANCY: ICD-10-CM

## 2025-05-21 DIAGNOSIS — O30.049 DICHORIONIC DIAMNIOTIC TWIN PREGNANCY, ANTEPARTUM: ICD-10-CM

## 2025-05-21 PROCEDURE — 3074F SYST BP LT 130 MM HG: CPT | Performed by: OBSTETRICS & GYNECOLOGY

## 2025-05-21 PROCEDURE — 99207 PR PRENATAL VISIT: CPT | Performed by: OBSTETRICS & GYNECOLOGY

## 2025-05-21 PROCEDURE — 87653 STREP B DNA AMP PROBE: CPT | Performed by: OBSTETRICS & GYNECOLOGY

## 2025-05-21 PROCEDURE — 0502F SUBSEQUENT PRENATAL CARE: CPT | Performed by: OBSTETRICS & GYNECOLOGY

## 2025-05-21 PROCEDURE — 3078F DIAST BP <80 MM HG: CPT | Performed by: OBSTETRICS & GYNECOLOGY

## 2025-05-21 RX ORDER — LIDOCAINE 4% 4 G/100G
CREAM TOPICAL
COMMUNITY
Start: 2025-04-21

## 2025-05-21 RX ORDER — HYDROCORTISONE ACETATE 25 MG/1
SUPPOSITORY RECTAL
COMMUNITY
Start: 2025-05-12

## 2025-05-21 RX ORDER — PSEUDOEPHEDRINE HCL 30 MG
100 TABLET ORAL
COMMUNITY
Start: 2025-04-21

## 2025-05-21 NOTE — TELEPHONE ENCOUNTER
Type of Paperwork received:  Physician Statement Form     Date Rcvd:  5/21/2025    Rcvd From (Company name): [employer]    Telephone/Fax number to fax forms to: email to [email or web address]     Provider:  Dr. Jimenez    Placed on Provider Cart Date:  5/21/2025

## 2025-05-21 NOTE — PROGRESS NOTES
Doing well today but definitely ready to be done  +FM x 2  No ctx/cramping/vb/spotting  Still having issues with hemorrhoids  No bladder issues  Had growth us with MFM this week --baby B lagging a bit so will repeat us again at 37wks  A: vtx, EFW 2590g/5-11# (36%ile) MVP 4.9cm  B: tr, EFW 2276g/5-1# (11%ile) MVP 5.0cm  Will start weekly BPP next week  GBS collected  Labor precautions reviewed  C/S discussed in detail including preop instructions/ERAS, procedure and recovery  NO salpingectomy -- has vasectomy consultation scheduled  RTC 1wk

## 2025-05-22 ENCOUNTER — RESULTS FOLLOW-UP (OUTPATIENT)
Dept: OBGYN | Facility: CLINIC | Age: 40
End: 2025-05-22

## 2025-05-22 LAB — GP B STREP DNA SPEC QL NAA+PROBE: NEGATIVE

## 2025-05-28 ENCOUNTER — ANCILLARY PROCEDURE (OUTPATIENT)
Dept: ULTRASOUND IMAGING | Facility: CLINIC | Age: 40
End: 2025-05-28
Attending: OBSTETRICS & GYNECOLOGY
Payer: COMMERCIAL

## 2025-05-28 ENCOUNTER — PRENATAL OFFICE VISIT (OUTPATIENT)
Dept: OBGYN | Facility: CLINIC | Age: 40
End: 2025-05-28
Attending: OBSTETRICS & GYNECOLOGY
Payer: COMMERCIAL

## 2025-05-28 VITALS — SYSTOLIC BLOOD PRESSURE: 110 MMHG | BODY MASS INDEX: 33.36 KG/M2 | WEIGHT: 213 LBS | DIASTOLIC BLOOD PRESSURE: 60 MMHG

## 2025-05-28 DIAGNOSIS — O30.049 DICHORIONIC DIAMNIOTIC TWIN PREGNANCY, ANTEPARTUM: ICD-10-CM

## 2025-05-28 DIAGNOSIS — O09.523 SUPERVISION OF HIGH RISK ELDERLY MULTIGRAVIDA IN THIRD TRIMESTER: Primary | ICD-10-CM

## 2025-05-28 DIAGNOSIS — O34.219 PREVIOUS CESAREAN DELIVERY, ANTEPARTUM: ICD-10-CM

## 2025-05-28 DIAGNOSIS — Z3A.36 36 WEEKS GESTATION OF PREGNANCY: ICD-10-CM

## 2025-05-28 PROCEDURE — 99207 PR PRENATAL VISIT: CPT | Performed by: OBSTETRICS & GYNECOLOGY

## 2025-05-28 PROCEDURE — 3074F SYST BP LT 130 MM HG: CPT | Performed by: OBSTETRICS & GYNECOLOGY

## 2025-05-28 PROCEDURE — 3078F DIAST BP <80 MM HG: CPT | Performed by: OBSTETRICS & GYNECOLOGY

## 2025-05-28 PROCEDURE — 0502F SUBSEQUENT PRENATAL CARE: CPT | Performed by: OBSTETRICS & GYNECOLOGY

## 2025-05-28 PROCEDURE — 76819 FETAL BIOPHYS PROFIL W/O NST: CPT | Performed by: OBSTETRICS & GYNECOLOGY

## 2025-05-28 NOTE — PROGRESS NOTES
Doing well today.  More allergies this week which has made her more uncomfortable  +FM x 2  Occ contractions but nothing more significant or painful; no vb/spotting/lof  No bowel/bladder issues  BPP today 8/8 x 2; has repeat growth us with MFM on Monday due to slight growth lag for twin 2 at last scan (5/19)  Discussed c/s in detail at last week's visit  Labor precautions reviewed

## 2025-05-29 ENCOUNTER — ANESTHESIA (OUTPATIENT)
Dept: OBGYN | Facility: CLINIC | Age: 40
End: 2025-05-29
Payer: COMMERCIAL

## 2025-05-29 ENCOUNTER — ANESTHESIA EVENT (OUTPATIENT)
Dept: OBGYN | Facility: CLINIC | Age: 40
End: 2025-05-29
Payer: COMMERCIAL

## 2025-05-29 ENCOUNTER — NURSE TRIAGE (OUTPATIENT)
Dept: NURSING | Facility: CLINIC | Age: 40
End: 2025-05-29
Payer: COMMERCIAL

## 2025-05-29 ENCOUNTER — HOSPITAL ENCOUNTER (INPATIENT)
Facility: CLINIC | Age: 40
End: 2025-05-29
Attending: OBSTETRICS & GYNECOLOGY | Admitting: OBSTETRICS & GYNECOLOGY
Payer: COMMERCIAL

## 2025-05-29 VITALS
TEMPERATURE: 98 F | HEART RATE: 64 BPM | RESPIRATION RATE: 18 BRPM | SYSTOLIC BLOOD PRESSURE: 88 MMHG | DIASTOLIC BLOOD PRESSURE: 47 MMHG | OXYGEN SATURATION: 97 % | BODY MASS INDEX: 33.43 KG/M2 | WEIGHT: 213 LBS | HEIGHT: 67 IN

## 2025-05-29 DIAGNOSIS — Z98.891 STATUS POST CESAREAN DELIVERY: Primary | ICD-10-CM

## 2025-05-29 DIAGNOSIS — O09.521 SUPERVISION OF HIGH RISK ELDERLY MULTIGRAVIDA IN FIRST TRIMESTER: ICD-10-CM

## 2025-05-29 PROBLEM — O30.043 DICHORIONIC DIAMNIOTIC TWIN PREGNANCY IN THIRD TRIMESTER: Status: ACTIVE | Noted: 2025-05-29

## 2025-05-29 LAB
ABO + RH BLD: NORMAL
BLD GP AB SCN SERPL QL: NEGATIVE
ERYTHROCYTE [DISTWIDTH] IN BLOOD BY AUTOMATED COUNT: 14.3 % (ref 10–15)
HCT VFR BLD AUTO: 34.9 % (ref 35–47)
HGB BLD-MCNC: 11.8 G/DL (ref 11.7–15.7)
HOLD SPECIMEN: NORMAL
MCH RBC QN AUTO: 32.4 PG (ref 26.5–33)
MCHC RBC AUTO-ENTMCNC: 33.8 G/DL (ref 31.5–36.5)
MCV RBC AUTO: 96 FL (ref 78–100)
PLATELET # BLD AUTO: 212 10E3/UL (ref 150–450)
RBC # BLD AUTO: 3.64 10E6/UL (ref 3.8–5.2)
RUPTURE OF FETAL MEMBRANES BY ROM PLUS: POSITIVE
SPECIMEN EXP DATE BLD: NORMAL
T PALLIDUM AB SER QL: NONREACTIVE
WBC # BLD AUTO: 16.8 10E3/UL (ref 4–11)

## 2025-05-29 PROCEDURE — 84112 EVAL AMNIOTIC FLUID PROTEIN: CPT | Performed by: OBSTETRICS & GYNECOLOGY

## 2025-05-29 PROCEDURE — 36415 COLL VENOUS BLD VENIPUNCTURE: CPT | Performed by: OBSTETRICS & GYNECOLOGY

## 2025-05-29 PROCEDURE — 710N000009 HC RECOVERY PHASE 1, LEVEL 1, PER MIN: Performed by: OBSTETRICS & GYNECOLOGY

## 2025-05-29 PROCEDURE — 272N000001 HC OR GENERAL SUPPLY STERILE: Performed by: OBSTETRICS & GYNECOLOGY

## 2025-05-29 PROCEDURE — 258N000003 HC RX IP 258 OP 636: Performed by: OBSTETRICS & GYNECOLOGY

## 2025-05-29 PROCEDURE — 999N000157 HC STATISTIC RCP TIME EA 10 MIN

## 2025-05-29 PROCEDURE — 88307 TISSUE EXAM BY PATHOLOGIST: CPT | Mod: TC | Performed by: OBSTETRICS & GYNECOLOGY

## 2025-05-29 PROCEDURE — 370N000017 HC ANESTHESIA TECHNICAL FEE, PER MIN: Performed by: OBSTETRICS & GYNECOLOGY

## 2025-05-29 PROCEDURE — 88307 TISSUE EXAM BY PATHOLOGIST: CPT | Mod: 26 | Performed by: PATHOLOGY

## 2025-05-29 PROCEDURE — 86780 TREPONEMA PALLIDUM: CPT | Performed by: OBSTETRICS & GYNECOLOGY

## 2025-05-29 PROCEDURE — 250N000013 HC RX MED GY IP 250 OP 250 PS 637: Performed by: OBSTETRICS & GYNECOLOGY

## 2025-05-29 PROCEDURE — 258N000003 HC RX IP 258 OP 636: Performed by: ANESTHESIOLOGY

## 2025-05-29 PROCEDURE — 250N000011 HC RX IP 250 OP 636: Mod: JZ | Performed by: OBSTETRICS & GYNECOLOGY

## 2025-05-29 PROCEDURE — 360N000076 HC SURGERY LEVEL 3, PER MIN: Performed by: OBSTETRICS & GYNECOLOGY

## 2025-05-29 PROCEDURE — 120N000013 HC R&B IMCU

## 2025-05-29 PROCEDURE — G0463 HOSPITAL OUTPT CLINIC VISIT: HCPCS | Mod: 6MC

## 2025-05-29 PROCEDURE — 59510 CESAREAN DELIVERY: CPT | Performed by: OBSTETRICS & GYNECOLOGY

## 2025-05-29 PROCEDURE — 250N000011 HC RX IP 250 OP 636: Performed by: ANESTHESIOLOGY

## 2025-05-29 PROCEDURE — 59514 CESAREAN DELIVERY ONLY: CPT | Mod: AS

## 2025-05-29 PROCEDURE — 250N000009 HC RX 250: Performed by: ANESTHESIOLOGY

## 2025-05-29 PROCEDURE — 250N000011 HC RX IP 250 OP 636: Performed by: OBSTETRICS & GYNECOLOGY

## 2025-05-29 PROCEDURE — 86901 BLOOD TYPING SEROLOGIC RH(D): CPT | Performed by: OBSTETRICS & GYNECOLOGY

## 2025-05-29 PROCEDURE — 999N000016 HC STATISTIC ATTENDANCE AT DELIVERY

## 2025-05-29 PROCEDURE — 85041 AUTOMATED RBC COUNT: CPT | Performed by: OBSTETRICS & GYNECOLOGY

## 2025-05-29 RX ORDER — CITRIC ACID/SODIUM CITRATE 334-500MG
30 SOLUTION, ORAL ORAL
Status: COMPLETED | OUTPATIENT
Start: 2025-05-29 | End: 2025-05-29

## 2025-05-29 RX ORDER — LOPERAMIDE HYDROCHLORIDE 2 MG/1
2 CAPSULE ORAL
Status: DISCONTINUED | OUTPATIENT
Start: 2025-05-29 | End: 2025-06-01 | Stop reason: HOSPADM

## 2025-05-29 RX ORDER — HYDROCORTISONE 25 MG/G
CREAM TOPICAL 3 TIMES DAILY PRN
Status: DISCONTINUED | OUTPATIENT
Start: 2025-05-29 | End: 2025-06-01 | Stop reason: HOSPADM

## 2025-05-29 RX ORDER — IBUPROFEN 400 MG/1
800 TABLET, FILM COATED ORAL EVERY 6 HOURS
Status: DISCONTINUED | OUTPATIENT
Start: 2025-05-30 | End: 2025-06-01 | Stop reason: HOSPADM

## 2025-05-29 RX ORDER — SIMETHICONE 80 MG
80 TABLET,CHEWABLE ORAL 4 TIMES DAILY PRN
Status: DISCONTINUED | OUTPATIENT
Start: 2025-05-29 | End: 2025-06-01 | Stop reason: HOSPADM

## 2025-05-29 RX ORDER — ONDANSETRON 2 MG/ML
INJECTION INTRAMUSCULAR; INTRAVENOUS PRN
Status: DISCONTINUED | OUTPATIENT
Start: 2025-05-29 | End: 2025-05-29

## 2025-05-29 RX ORDER — EPHEDRINE SULFATE 50 MG/ML
INJECTION, SOLUTION INTRAMUSCULAR; INTRAVENOUS; SUBCUTANEOUS PRN
Status: DISCONTINUED | OUTPATIENT
Start: 2025-05-29 | End: 2025-05-29

## 2025-05-29 RX ORDER — SODIUM CHLORIDE, SODIUM LACTATE, POTASSIUM CHLORIDE, CALCIUM CHLORIDE 600; 310; 30; 20 MG/100ML; MG/100ML; MG/100ML; MG/100ML
INJECTION, SOLUTION INTRAVENOUS CONTINUOUS
Status: DISCONTINUED | OUTPATIENT
Start: 2025-05-29 | End: 2025-05-29

## 2025-05-29 RX ORDER — OXYTOCIN/0.9 % SODIUM CHLORIDE 30/500 ML
340 PLASTIC BAG, INJECTION (ML) INTRAVENOUS CONTINUOUS PRN
Status: DISCONTINUED | OUTPATIENT
Start: 2025-05-29 | End: 2025-05-29

## 2025-05-29 RX ORDER — ACETAMINOPHEN 325 MG/1
975 TABLET ORAL ONCE
Status: COMPLETED | OUTPATIENT
Start: 2025-05-29 | End: 2025-05-29

## 2025-05-29 RX ORDER — OXYCODONE HYDROCHLORIDE 5 MG/1
5 TABLET ORAL EVERY 4 HOURS PRN
Refills: 0 | Status: DISCONTINUED | OUTPATIENT
Start: 2025-05-29 | End: 2025-06-01 | Stop reason: HOSPADM

## 2025-05-29 RX ORDER — SODIUM CHLORIDE, SODIUM LACTATE, POTASSIUM CHLORIDE, CALCIUM CHLORIDE 600; 310; 30; 20 MG/100ML; MG/100ML; MG/100ML; MG/100ML
INJECTION, SOLUTION INTRAVENOUS CONTINUOUS
Status: DISCONTINUED | OUTPATIENT
Start: 2025-05-29 | End: 2025-06-01 | Stop reason: HOSPADM

## 2025-05-29 RX ORDER — AZITHROMYCIN MONOHYDRATE 500 MG/5ML
500 INJECTION, POWDER, LYOPHILIZED, FOR SOLUTION INTRAVENOUS
Status: COMPLETED | OUTPATIENT
Start: 2025-05-29 | End: 2025-05-29

## 2025-05-29 RX ORDER — NALBUPHINE HYDROCHLORIDE 10 MG/ML
5 INJECTION INTRAMUSCULAR; INTRAVENOUS; SUBCUTANEOUS
Status: COMPLETED | OUTPATIENT
Start: 2025-05-29 | End: 2025-05-29

## 2025-05-29 RX ORDER — NALOXONE HYDROCHLORIDE 0.4 MG/ML
0.4 INJECTION, SOLUTION INTRAMUSCULAR; INTRAVENOUS; SUBCUTANEOUS
Status: DISCONTINUED | OUTPATIENT
Start: 2025-05-29 | End: 2025-06-01 | Stop reason: HOSPADM

## 2025-05-29 RX ORDER — OXYTOCIN 10 [USP'U]/ML
10 INJECTION, SOLUTION INTRAMUSCULAR; INTRAVENOUS
Status: DISCONTINUED | OUTPATIENT
Start: 2025-05-29 | End: 2025-06-01 | Stop reason: HOSPADM

## 2025-05-29 RX ORDER — OXYTOCIN/0.9 % SODIUM CHLORIDE 30/500 ML
340 PLASTIC BAG, INJECTION (ML) INTRAVENOUS CONTINUOUS PRN
Status: DISCONTINUED | OUTPATIENT
Start: 2025-05-29 | End: 2025-06-01 | Stop reason: HOSPADM

## 2025-05-29 RX ORDER — OXYTOCIN 10 [USP'U]/ML
10 INJECTION, SOLUTION INTRAMUSCULAR; INTRAVENOUS
Status: DISCONTINUED | OUTPATIENT
Start: 2025-05-29 | End: 2025-05-29

## 2025-05-29 RX ORDER — ACETAMINOPHEN 325 MG/1
975 TABLET ORAL EVERY 6 HOURS
Status: DISCONTINUED | OUTPATIENT
Start: 2025-05-29 | End: 2025-06-01 | Stop reason: HOSPADM

## 2025-05-29 RX ORDER — METHYLERGONOVINE MALEATE 0.2 MG/ML
200 INJECTION INTRAVENOUS
Status: DISCONTINUED | OUTPATIENT
Start: 2025-05-29 | End: 2025-06-01 | Stop reason: HOSPADM

## 2025-05-29 RX ORDER — MISOPROSTOL 200 UG/1
400 TABLET ORAL
Status: DISCONTINUED | OUTPATIENT
Start: 2025-05-29 | End: 2025-06-01 | Stop reason: HOSPADM

## 2025-05-29 RX ORDER — CLINDAMYCIN PHOSPHATE 900 MG/50ML
900 INJECTION, SOLUTION INTRAVENOUS
Status: COMPLETED | OUTPATIENT
Start: 2025-05-29 | End: 2025-05-29

## 2025-05-29 RX ORDER — LOPERAMIDE HYDROCHLORIDE 2 MG/1
4 CAPSULE ORAL
Status: DISCONTINUED | OUTPATIENT
Start: 2025-05-29 | End: 2025-05-29

## 2025-05-29 RX ORDER — BISACODYL 10 MG
10 SUPPOSITORY, RECTAL RECTAL DAILY PRN
Status: DISCONTINUED | OUTPATIENT
Start: 2025-05-31 | End: 2025-06-01 | Stop reason: HOSPADM

## 2025-05-29 RX ORDER — AZITHROMYCIN MONOHYDRATE 500 MG/5ML
INJECTION, POWDER, LYOPHILIZED, FOR SOLUTION INTRAVENOUS
Status: DISCONTINUED
Start: 2025-05-29 | End: 2025-05-29 | Stop reason: HOSPADM

## 2025-05-29 RX ORDER — METOCLOPRAMIDE HYDROCHLORIDE 5 MG/ML
10 INJECTION INTRAMUSCULAR; INTRAVENOUS EVERY 6 HOURS PRN
Status: DISCONTINUED | OUTPATIENT
Start: 2025-05-29 | End: 2025-06-01 | Stop reason: HOSPADM

## 2025-05-29 RX ORDER — TRANEXAMIC ACID 10 MG/ML
1 INJECTION, SOLUTION INTRAVENOUS EVERY 30 MIN PRN
Status: DISCONTINUED | OUTPATIENT
Start: 2025-05-29 | End: 2025-05-29

## 2025-05-29 RX ORDER — MISOPROSTOL 200 UG/1
400 TABLET ORAL
Status: DISCONTINUED | OUTPATIENT
Start: 2025-05-29 | End: 2025-05-29

## 2025-05-29 RX ORDER — DIPHENHYDRAMINE HCL 25 MG
25 CAPSULE ORAL EVERY 6 HOURS PRN
Status: DISCONTINUED | OUTPATIENT
Start: 2025-05-29 | End: 2025-06-01 | Stop reason: HOSPADM

## 2025-05-29 RX ORDER — BUPIVACAINE HYDROCHLORIDE 7.5 MG/ML
INJECTION, SOLUTION INTRASPINAL PRN
Status: DISCONTINUED | OUTPATIENT
Start: 2025-05-29 | End: 2025-05-29

## 2025-05-29 RX ORDER — PROCHLORPERAZINE MALEATE 10 MG
10 TABLET ORAL EVERY 6 HOURS PRN
Status: DISCONTINUED | OUTPATIENT
Start: 2025-05-29 | End: 2025-06-01 | Stop reason: HOSPADM

## 2025-05-29 RX ORDER — METOCLOPRAMIDE 10 MG/1
10 TABLET ORAL EVERY 6 HOURS PRN
Status: DISCONTINUED | OUTPATIENT
Start: 2025-05-29 | End: 2025-06-01 | Stop reason: HOSPADM

## 2025-05-29 RX ORDER — ONDANSETRON 2 MG/ML
4 INJECTION INTRAMUSCULAR; INTRAVENOUS EVERY 6 HOURS PRN
Status: DISCONTINUED | OUTPATIENT
Start: 2025-05-29 | End: 2025-06-01 | Stop reason: HOSPADM

## 2025-05-29 RX ORDER — ONDANSETRON 4 MG/1
4 TABLET, ORALLY DISINTEGRATING ORAL EVERY 6 HOURS PRN
Status: DISCONTINUED | OUTPATIENT
Start: 2025-05-29 | End: 2025-06-01 | Stop reason: HOSPADM

## 2025-05-29 RX ORDER — LOPERAMIDE HYDROCHLORIDE 2 MG/1
2 CAPSULE ORAL
Status: DISCONTINUED | OUTPATIENT
Start: 2025-05-29 | End: 2025-05-29

## 2025-05-29 RX ORDER — KETOROLAC TROMETHAMINE 15 MG/ML
15 INJECTION, SOLUTION INTRAMUSCULAR; INTRAVENOUS EVERY 6 HOURS
Status: COMPLETED | OUTPATIENT
Start: 2025-05-29 | End: 2025-05-30

## 2025-05-29 RX ORDER — SODIUM PHOSPHATE,MONO-DIBASIC 19G-7G/118
1 ENEMA (ML) RECTAL DAILY PRN
Status: DISCONTINUED | OUTPATIENT
Start: 2025-05-31 | End: 2025-06-01 | Stop reason: HOSPADM

## 2025-05-29 RX ORDER — TRANEXAMIC ACID 10 MG/ML
1 INJECTION, SOLUTION INTRAVENOUS EVERY 30 MIN PRN
Status: DISCONTINUED | OUTPATIENT
Start: 2025-05-29 | End: 2025-06-01 | Stop reason: HOSPADM

## 2025-05-29 RX ORDER — AMOXICILLIN 250 MG
1 CAPSULE ORAL 2 TIMES DAILY
Status: DISCONTINUED | OUTPATIENT
Start: 2025-05-29 | End: 2025-06-01 | Stop reason: HOSPADM

## 2025-05-29 RX ORDER — NALOXONE HYDROCHLORIDE 0.4 MG/ML
0.2 INJECTION, SOLUTION INTRAMUSCULAR; INTRAVENOUS; SUBCUTANEOUS
Status: DISCONTINUED | OUTPATIENT
Start: 2025-05-29 | End: 2025-06-01 | Stop reason: HOSPADM

## 2025-05-29 RX ORDER — MORPHINE SULFATE 1 MG/ML
INJECTION, SOLUTION EPIDURAL; INTRATHECAL; INTRAVENOUS PRN
Status: DISCONTINUED | OUTPATIENT
Start: 2025-05-29 | End: 2025-05-29

## 2025-05-29 RX ORDER — CARBOPROST TROMETHAMINE 250 UG/ML
250 INJECTION, SOLUTION INTRAMUSCULAR
Status: DISCONTINUED | OUTPATIENT
Start: 2025-05-29 | End: 2025-06-01 | Stop reason: HOSPADM

## 2025-05-29 RX ORDER — KETOROLAC TROMETHAMINE 15 MG/ML
INJECTION, SOLUTION INTRAMUSCULAR; INTRAVENOUS
Status: COMPLETED
Start: 2025-05-29 | End: 2025-05-29

## 2025-05-29 RX ORDER — MISOPROSTOL 200 UG/1
800 TABLET ORAL
Status: DISCONTINUED | OUTPATIENT
Start: 2025-05-29 | End: 2025-06-01 | Stop reason: HOSPADM

## 2025-05-29 RX ORDER — MISOPROSTOL 200 UG/1
800 TABLET ORAL
Status: DISCONTINUED | OUTPATIENT
Start: 2025-05-29 | End: 2025-05-29

## 2025-05-29 RX ORDER — LIDOCAINE 40 MG/G
CREAM TOPICAL
Status: DISCONTINUED | OUTPATIENT
Start: 2025-05-29 | End: 2025-06-01 | Stop reason: HOSPADM

## 2025-05-29 RX ORDER — AMOXICILLIN 250 MG
2 CAPSULE ORAL 2 TIMES DAILY
Status: DISCONTINUED | OUTPATIENT
Start: 2025-05-29 | End: 2025-06-01 | Stop reason: HOSPADM

## 2025-05-29 RX ORDER — LOPERAMIDE HYDROCHLORIDE 2 MG/1
4 CAPSULE ORAL
Status: DISCONTINUED | OUTPATIENT
Start: 2025-05-29 | End: 2025-06-01 | Stop reason: HOSPADM

## 2025-05-29 RX ORDER — CARBOPROST TROMETHAMINE 250 UG/ML
250 INJECTION, SOLUTION INTRAMUSCULAR
Status: DISCONTINUED | OUTPATIENT
Start: 2025-05-29 | End: 2025-05-29

## 2025-05-29 RX ORDER — LIDOCAINE 40 MG/G
CREAM TOPICAL
Status: DISCONTINUED | OUTPATIENT
Start: 2025-05-29 | End: 2025-05-29

## 2025-05-29 RX ORDER — METHYLERGONOVINE MALEATE 0.2 MG/ML
200 INJECTION INTRAVENOUS
Status: DISCONTINUED | OUTPATIENT
Start: 2025-05-29 | End: 2025-05-29

## 2025-05-29 RX ORDER — OXYTOCIN/0.9 % SODIUM CHLORIDE 30/500 ML
PLASTIC BAG, INJECTION (ML) INTRAVENOUS CONTINUOUS PRN
Status: DISCONTINUED | OUTPATIENT
Start: 2025-05-29 | End: 2025-05-29

## 2025-05-29 RX ORDER — DEXTROSE, SODIUM CHLORIDE, SODIUM LACTATE, POTASSIUM CHLORIDE, AND CALCIUM CHLORIDE 5; .6; .31; .03; .02 G/100ML; G/100ML; G/100ML; G/100ML; G/100ML
INJECTION, SOLUTION INTRAVENOUS CONTINUOUS
Status: DISCONTINUED | OUTPATIENT
Start: 2025-05-29 | End: 2025-06-01 | Stop reason: HOSPADM

## 2025-05-29 RX ADMIN — PHENYLEPHRINE HYDROCHLORIDE 100 MCG: 10 INJECTION INTRAVENOUS at 09:32

## 2025-05-29 RX ADMIN — CLINDAMYCIN PHOSPHATE 900 MG: 900 INJECTION, SOLUTION INTRAVENOUS at 09:21

## 2025-05-29 RX ADMIN — SODIUM CHLORIDE, SODIUM LACTATE, POTASSIUM CHLORIDE, AND CALCIUM CHLORIDE 500 ML: .6; .31; .03; .02 INJECTION, SOLUTION INTRAVENOUS at 08:39

## 2025-05-29 RX ADMIN — Medication 5 MG: at 09:51

## 2025-05-29 RX ADMIN — ACETAMINOPHEN 975 MG: 325 TABLET, FILM COATED ORAL at 15:06

## 2025-05-29 RX ADMIN — Medication 5 MG: at 09:27

## 2025-05-29 RX ADMIN — KETOROLAC TROMETHAMINE 15 MG: 15 INJECTION, SOLUTION INTRAMUSCULAR; INTRAVENOUS at 11:41

## 2025-05-29 RX ADMIN — ONDANSETRON 4 MG: 2 INJECTION, SOLUTION INTRAMUSCULAR; INTRAVENOUS at 17:02

## 2025-05-29 RX ADMIN — Medication 340 ML/HR: at 09:38

## 2025-05-29 RX ADMIN — SENNOSIDES AND DOCUSATE SODIUM 1 TABLET: 50; 8.6 TABLET ORAL at 20:55

## 2025-05-29 RX ADMIN — PHENYLEPHRINE HYDROCHLORIDE 0.5 MCG/KG/MIN: 10 INJECTION INTRAVENOUS at 09:16

## 2025-05-29 RX ADMIN — MORPHINE SULFATE 200 MCG: 1 INJECTION, SOLUTION EPIDURAL; INTRATHECAL; INTRAVENOUS at 09:13

## 2025-05-29 RX ADMIN — SODIUM CHLORIDE, SODIUM LACTATE, POTASSIUM CHLORIDE, AND CALCIUM CHLORIDE: .6; .31; .03; .02 INJECTION, SOLUTION INTRAVENOUS at 10:52

## 2025-05-29 RX ADMIN — Medication 5 MG: at 09:30

## 2025-05-29 RX ADMIN — ONDANSETRON 4 MG: 2 INJECTION INTRAMUSCULAR; INTRAVENOUS at 09:14

## 2025-05-29 RX ADMIN — NALBUPHINE HYDROCHLORIDE 5 MG: 10 INJECTION, SOLUTION INTRAMUSCULAR; INTRAVENOUS; SUBCUTANEOUS at 17:48

## 2025-05-29 RX ADMIN — Medication 10 MG: at 09:33

## 2025-05-29 RX ADMIN — GENTAMICIN SULFATE 150 MG: 40 INJECTION, SOLUTION INTRAMUSCULAR; INTRAVENOUS at 08:39

## 2025-05-29 RX ADMIN — PHENYLEPHRINE HYDROCHLORIDE 100 MCG: 10 INJECTION INTRAVENOUS at 09:21

## 2025-05-29 RX ADMIN — PHENYLEPHRINE HYDROCHLORIDE 100 MCG: 10 INJECTION INTRAVENOUS at 09:51

## 2025-05-29 RX ADMIN — DEXTROSE, SODIUM CHLORIDE, SODIUM LACTATE, POTASSIUM CHLORIDE, AND CALCIUM CHLORIDE: 5; .6; .31; .03; .02 INJECTION, SOLUTION INTRAVENOUS at 13:55

## 2025-05-29 RX ADMIN — BUPIVACAINE HYDROCHLORIDE IN DEXTROSE 1.8 ML: 7.5 INJECTION, SOLUTION SUBARACHNOID at 09:13

## 2025-05-29 RX ADMIN — SODIUM CITRATE AND CITRIC ACID MONOHYDRATE 30 ML: 500; 334 SOLUTION ORAL at 08:40

## 2025-05-29 RX ADMIN — AZITHROMYCIN MONOHYDRATE 500 MG: 500 INJECTION, POWDER, LYOPHILIZED, FOR SOLUTION INTRAVENOUS at 09:21

## 2025-05-29 RX ADMIN — ACETAMINOPHEN 975 MG: 325 TABLET, FILM COATED ORAL at 08:39

## 2025-05-29 RX ADMIN — SODIUM CHLORIDE, SODIUM LACTATE, POTASSIUM CHLORIDE, AND CALCIUM CHLORIDE: .6; .31; .03; .02 INJECTION, SOLUTION INTRAVENOUS at 09:06

## 2025-05-29 RX ADMIN — ACETAMINOPHEN 975 MG: 325 TABLET, FILM COATED ORAL at 20:55

## 2025-05-29 RX ADMIN — TRANEXAMIC ACID 1 G: 1 INJECTION, SOLUTION INTRAVENOUS at 09:38

## 2025-05-29 RX ADMIN — DIPHENHYDRAMINE HYDROCHLORIDE 25 MG: 25 CAPSULE ORAL at 23:03

## 2025-05-29 RX ADMIN — PHENYLEPHRINE HYDROCHLORIDE 100 MCG: 10 INJECTION INTRAVENOUS at 09:18

## 2025-05-29 RX ADMIN — KETOROLAC TROMETHAMINE 15 MG: 15 INJECTION, SOLUTION INTRAMUSCULAR; INTRAVENOUS at 17:48

## 2025-05-29 RX ADMIN — METOCLOPRAMIDE 10 MG: 5 INJECTION, SOLUTION INTRAMUSCULAR; INTRAVENOUS at 13:15

## 2025-05-29 ASSESSMENT — ACTIVITIES OF DAILY LIVING (ADL)
ADLS_ACUITY_SCORE: 19
ADLS_ACUITY_SCORE: 17
ADLS_ACUITY_SCORE: 16
ADLS_ACUITY_SCORE: 19
ADLS_ACUITY_SCORE: 17
ADLS_ACUITY_SCORE: 19
ADLS_ACUITY_SCORE: 17
ADLS_ACUITY_SCORE: 19
ADLS_ACUITY_SCORE: 17
ADLS_ACUITY_SCORE: 19
ADLS_ACUITY_SCORE: 16
ADLS_ACUITY_SCORE: 19
ADLS_ACUITY_SCORE: 19
ADLS_ACUITY_SCORE: 16

## 2025-05-29 ASSESSMENT — COLUMBIA-SUICIDE SEVERITY RATING SCALE - C-SSRS
6. HAVE YOU EVER DONE ANYTHING, STARTED TO DO ANYTHING, OR PREPARED TO DO ANYTHING TO END YOUR LIFE?: NO
2. HAVE YOU ACTUALLY HAD ANY THOUGHTS OF KILLING YOURSELF IN THE PAST MONTH?: NO
1. IN THE PAST MONTH, HAVE YOU WISHED YOU WERE DEAD OR WISHED YOU COULD GO TO SLEEP AND NOT WAKE UP?: NO

## 2025-05-29 NOTE — ANESTHESIA PROCEDURE NOTES
"Intrathecal Procedure Note    Pre-Procedure   Staff -        Anesthesiologist:  Jose A Capone MD       Performed By: anesthesiologist       Location: OR       Pre-Anesthestic Checklist: patient identified, IV checked, site marked, risks and benefits discussed, informed consent, monitors and equipment checked, pre-op evaluation and at physician/surgeon's request  Timeout:       Correct Patient: Yes        Correct Procedure: Yes        Correct Site: Yes        Correct Position: Yes   Procedure Documentation  Procedure: intrathecal         Patient Position: sitting       Skin prep: Betadine       Insertion Site: L3-4. (midline approach).       Spinal Needle Type: Edwina-Shmuel       Introducer used       Introducer: 20 G       # of attempts: 1 and  # of redirects:     Assessment/Narrative         Paresthesias: No.       CSF fluid: clear.       Opening pressure was cmH2O while  Sitting.       Comments:  Patient sitting on edge of OR bed, lower back cleaned and prepped in sterile fashion with betadine. 1% lido used to numb area. Introducer placed, spinal needle through introducer. Appropriate flow of CSF and confirmed with aspiration via syringe. Spinal dose given, 13.5 mg 0.75% bupivacaine with 200 mcg Duramorph. No complications.       FOR [facility] ONLY:   Pain Team Contact information: please page the Pain Team Via [employer]. Search \"Pain\". During daytime hours, please page the attending first. At night please page the resident first.      "

## 2025-05-29 NOTE — ANESTHESIA CARE TRANSFER NOTE
Patient: Bridgett Brady    Procedure: Procedure(s):  REPEAT  SECTION       Diagnosis: Previous  delivery, antepartum [O34.219]  Dichorionic diamniotic twin pregnancy, antepartum [O30.049]  Diagnosis Additional Information: No value filed.    Anesthesia Type:   Spinal     Note:    Oropharynx: oropharynx clear of all foreign objects and spontaneously breathing  Level of Consciousness: awake  Oxygen Supplementation: room air    Independent Airway: airway patency satisfactory and stable  Dentition: dentition unchanged  Vital Signs Stable: post-procedure vital signs reviewed and stable  Report to RN Given: handoff report given  Patient transferred to: Phase II    Handoff Report: Identifed the Patient, Identified the Reponsible Provider, Reviewed the pertinent medical history, Discussed the surgical course, Reviewed Intra-OP anesthesia mangement and issues during anesthesia, Set expectations for post-procedure period and Allowed opportunity for questions and acknowledgement of understanding    Vitals:  Vitals Value Taken Time   /33    Temp     Pulse 79 25 10:28   Resp 20 25 10:28   SpO2 96 % 25 10:28   Vitals shown include unfiled device data.    Electronically Signed By: LITZY Arriaza CRNA  May 29, 2025  10:29 AM

## 2025-05-29 NOTE — PLAN OF CARE
Report received. Care assumed. Patient being admitted/ early labor. ROM+ pending. Patient prepped for  section. HX and VS taken. Pre-op orders placed. PIV started.            Dressing (No Sutures): dry sterile dressing

## 2025-05-29 NOTE — PLAN OF CARE
Repeat  section. VSS. Fundus firm/light flow. Patient denies pain. Doing well with ice chips and popsicle.

## 2025-05-29 NOTE — L&D DELIVERY NOTE
Obstetrics Brief Operative Note    Pre-operative diagnosis: 37+0wks  Twin pregnancy  Prior  section x 2  SROM   Post-operative diagnosis: Same   Procedure: Repeat low transverse  section   Surgeon: Brandee Jimenez MD   Assistant(s): Cecille EVANS   Anesthesia: Spinal anesthesia   Estimated blood loss: 665mL   Complications: None   Findings: Baby A: vtx, female, weight 6lbs 4oz, Apgars 9, 9  Baby B: transverse, male, weight 4lbs 14oz, Apgars 8,9  Placenta: normal  Tubes: normal  Uterus: normal  Ovaries: normal     Primary OB: Jimenez

## 2025-05-29 NOTE — TELEPHONE ENCOUNTER
"OB Triage Call      Is patient's OB/Midwife with the [facility] or [facility]? [facility]- Proceed with triage     Reason for call: rupture of membranes    Assessment:    37w0d twin pregnancy  4 AM had possible ROM, continues to have leaking of fluid from vagina.  Irregular contractions  No vaginal bleeding      Plan: L&D    Patient plans to deliver at [facility]    Patient's primary OB Provider is Dr. Jimenez.      Per protocol recommendations Patient to be evaluated in L&D. Patient's primary OB is [facility].  Labor and delivery at [facility] ([phone]) notified of patient's pending arrival.  Report given to Cori MONDRAGON.      Is patient's delivering hospital on divert? No      37w0d    Estimated Date of Delivery: 2025        OB History    Para Term  AB Living   4 2 2 0 1 2   SAB IAB Ectopic Multiple Live Births   0 0 0 0 2      # Outcome Date GA Lbr Riki/2nd Weight Sex Type Anes PTL Lv   4 Current            3 AB 2023              Birth Comments: had D&C 2024   2 Term 21 39w3d  3.39 kg (7 lb 7.6 oz) F CS-LTranv Spinal  [facility]      Name: Eusebia      Apgar1: 9  Apgar5: 9   1 Term 19 41w6d  4.05 kg (8 lb 14.9 oz) M CS-LTranv  N [facility]ALE      Complications: Fetal Intolerance      Name: Foreign      Apgar1: 8  Apgar5: 9       Lab Results   Component Value Date    GBS Negative 2021          Marisel Stapleton, RN   Reason for Disposition   Leakage of fluid from vagina  (Exception: Patient is uncertain, but thinks it might be urine incontinence.)    Additional Information   Negative: [1] SEVERE abdominal pain (e.g., excruciating) AND [2] constant   Negative: SEVERE bleeding (e.g., continuous red blood from vagina, or large blood clots)   Negative: Umbilical cord hanging out of the vagina (shiny, white, curled appearance, \"like telephone cord\")   Negative: Uncontrollable urge to push (i.e., feels like baby is coming out now)   Negative: Can see baby   Negative: Sounds like a " life-threatening emergency to the triager   Negative: < 20 weeks pregnant   Negative: Vaginal bleeding    Protocols used: Pregnancy - Rupture of Membranes [id number]

## 2025-05-29 NOTE — ANESTHESIA PREPROCEDURE EVALUATION
Anesthesia Pre-Procedure Evaluation    Patient: Bridgett Brady   MRN: [id number] : 1985          Procedure : Procedure(s):  REPEAT  SECTION         Past Medical History:   Diagnosis Date    Anxiety     Depressive disorder     no meds    Insomnia     Miscarriage 2023    Motion sickness       Past Surgical History:   Procedure Laterality Date     SECTION N/A 2019    Procedure:  SECTION;  Surgeon: Brandee Jimenez MD;  Location:  L+D     SECTION N/A 2021    Procedure: REPEAT  SECTION;  Surgeon: Brandee Jimenez MD;  Location:  L+D    COLONOSCOPY      DILATION AND CURETTAGE SUCTION WITH ULTRASOUND GUIDANCE N/A 2024    Procedure: Dilation and curettage of the uterus using suction under ultrasound guidance;  Surgeon: Cristina Farfan MD;  Location:  OR    HEAD & NECK SURGERY      wisdom teeth extraction      Allergies   Allergen Reactions    Amoxicillin      Reaction as a child  Did have reaction last year as adult    Penicillins Unknown     As an infant   Reaction as a child    As an infant, confirmed at age 37      Social History     Tobacco Use    Smoking status: Never    Smokeless tobacco: Never   Substance Use Topics    Alcohol use: Not Currently      Wt Readings from Last 1 Encounters:   25 96.6 kg (213 lb)        Anesthesia Evaluation   Pt has had prior anesthetic.     History of anesthetic complications  - motion sickness.      ROS/MED HX  ENT/Pulmonary:       Neurologic:       Cardiovascular:       METS/Exercise Tolerance:     Hematologic:       Musculoskeletal:       GI/Hepatic:       Renal/Genitourinary:       Endo:       Psychiatric/Substance Use:     (+) psychiatric history anxiety and depression       Infectious Disease:       Malignancy:       Other:      (+)  , ,,twin IUP            Physical Exam  Airway  Mallampati: II  TM distance: > 3 FB  Neck ROM: full    Cardiovascular - normal exam   Dental - normal exam   "  Pulmonary - normal exam      Neurological   Other Findings       OUTSIDE LABS:  CBC:   Lab Results   Component Value Date    WBC 12.0 (H) 03/26/2025    WBC 11.6 (H) 11/26/2024    HGB 11.2 (L) 03/26/2025    HGB 13.1 11/26/2024    HCT 33.4 (L) 03/26/2025    HCT 39.3 11/26/2024     03/26/2025     11/26/2024     BMP:   Lab Results   Component Value Date     06/25/2019    POTASSIUM 4.1 06/25/2019    CHLORIDE 106 06/25/2019    CO2 21 06/25/2019    BUN 8 06/25/2019    CR 0.70 06/25/2019    GLC 35 (LL) 06/25/2019     COAGS: No results found for: \"PTT\", \"INR\", \"FIBR\"  POC: No results found for: \"BGM\", \"HCG\", \"HCGS\"  HEPATIC:   Lab Results   Component Value Date    ALBUMIN 3.0 (L) 06/25/2019    PROTTOTAL 6.6 (L) 06/25/2019    ALT 25 06/25/2019    AST 21 06/25/2019    ALKPHOS 130 06/25/2019    BILITOTAL 0.3 06/25/2019     OTHER:   Lab Results   Component Value Date    A1C 5.0 11/26/2024    CAROL 8.8 06/25/2019    TSH 1.12 10/06/2022       Anesthesia Plan    ASA Status:  2       Anesthesia Type: Spinal.        Consents    Anesthesia Plan(s) and associated risks, benefits, and realistic alternatives discussed. Questions answered and patient/representative(s) expressed understanding.     - Discussed:     - Discussed with:  Patient               Postoperative Care         Comments:                   Jose A Capone MD    I have reviewed the pertinent notes and labs in the chart from the past 30 days and (re)examined the patient.  Any updates or changes from those notes are reflected in this note.    Clinically Significant Risk Factors Present on Admission                 # Drug Induced Platelet Defect: home medication list includes an antiplatelet medication                              "

## 2025-05-29 NOTE — H&P
[facility]    History and Physical  Obstetrics and Gynecology     Date of Admission:  2025    Assessment & Plan   Bridgett Brady is a 39 year old female who presents for Repeat  section.     ASSESSMENT:   IUP @ 37w0d  SROM  Dichorionic diamniotic twin pregnancy      PLAN:   Scheduled for  section.  No questions.      Brandee Jimenez MD    History of Present Illness   Bridgett Brady is a 39 year old female  37w0d  Estimated Date of Delivery: 2025 is calculated from Patient's last menstrual period was 2024. [facility].  Felt large gush of fluid at around 4AM.  Has continued to leak clear fluid since.  Just now starting to have more painful contractions.  +FM x 2    PRENATAL COURSE  Prenatal course was complicated by twin gestation and prior  section x 2.      Recent Labs   Lab Test 24  1633 21  0537 20  0902   ABO  --   --  O   RH  --   --  Pos   AS Negative   < > Neg    < > = values in this interval not displayed.     Rhogam not indicated   Recent Labs   Lab Test 24  1633 21  0840   HEPBANG Nonreactive  --    HIAGAB Nonreactive  --    GBS  --  Negative   RUQIGG Positive  --          Prior to Admission Medications   Prior to Admission Medications   Prescriptions Last Dose Informant Patient Reported? Taking?   ANECREAM 4 % external cream   Yes No   Sig: APPLY 1 APPLICATION TO SKIN TWICE A DAY AS NEEDED.    MG capsule   Yes No   Sig: Take 100 mg by mouth.   Prenatal Vit-Fe Fumarate-FA (PRENATAL MULTIVITAMIN  PLUS IRON) 27-1 MG TABS 2025  Yes Yes   Sig: Take by mouth daily.   aspirin 81 MG EC tablet 2025  Yes Yes   Sig: Take 81 mg by mouth daily.   doxylamine (UNISOM) 25 MG TABS tablet 2025  Yes Yes   Sig: Take 25 mg by mouth at bedtime.   hydrocortisone (ANUSOL-HC) 25 MG suppository   Yes No   Sig: UNWRAP AND INSERT 1 SUPPOSITORY RECTALLY TWICE A  DAY FOR 2 WEEKS      Facility-Administered Medications: None     Allergies   Allergies   Allergen Reactions    Amoxicillin      Reaction as a child  Did have reaction last year as adult    Penicillins Unknown     As an infant   Reaction as a child    As an infant, confirmed at age 37         Immunization History   Immunization History   Administered Date(s) Administered    COVID-19 12+ (Pfizer) 2024    COVID-19 Bivalent 12+ (Pfizer) 01/10/2023    COVID-19 MONOVALENT 12+ (Pfizer) 2021, 2021, 2021    HIB (PRP-T) 1989    HPV Quadrivalent 2013, 2013, 10/23/2013, 2014    HepA, Unspecified 10/15/2018    HepB, Unspecified 1999, 2000, 2002, 2018    Hepatitis A (VAQTA)(ADULT 19+) 10/15/2018, 2019    Hepatitis B Immunity: Titer 2018    Hepatitis B, Peds (Engerix-B/Recombivax HB) 1999, 2000, 2002    Historic Hib Hib-titer 1989    Historical DTP/aP 1988, 1988, 1988, 1988, 1989, 1992, 1999    Hpv, Unspecified  2013, 2013, 10/23/2013, 2014    MMR (MMRII) 1989, 1997    OPV, trivalent, live 1988, 1988, 1988, 1989, 1992    Polio, Unspecified 1988, 1988, 1988, 1989, 1992    Rubella Immunity: Titer/md Dx 2018    TD,PF 7+ (Tenivac) 02/15/2011    TDAP (Adacel,Boostrix) 2011, 2019, 2021, 2025    TDAP Vaccine (Adacel) 2019    Td (Adult), Adsorbed 1999, 02/15/2011    Tdap (Adult) Unspecified Formulation 2011       Past Medical History:   Diagnosis Date    Anxiety     Depressive disorder     no meds    Insomnia     Miscarriage 2023    Motion sickness        Past Surgical History:   Procedure Laterality Date     SECTION N/A 2019    Procedure:  SECTION;  Surgeon: Brandee Jimenez MD;  Location:  L+D     SECTION N/A  2021    Procedure: REPEAT  SECTION;  Surgeon: Brandee Jimenez MD;  Location:  L+D    COLONOSCOPY      DILATION AND CURETTAGE SUCTION WITH ULTRASOUND GUIDANCE N/A 2024    Procedure: Dilation and curettage of the uterus using suction under ultrasound guidance;  Surgeon: Cristina Farfan MD;  Location:  OR    HEAD & NECK SURGERY      wisdom teeth extraction       Clinic vitals from today:  /56     Abdomen: gravid, single vertex fetus, non-tender, EFW 6 lbs and 5.5lbs    Constitutional: healthy, alert, active and no distress   Extremities: NT, no edema  Neurologic: Awake, alert, oriented x3  Neuropsychiatric: General: normal, calm and normal eye contact  Heart: Regular rate and rhythm  Lungs: clear to ausculation bilaterally    Brandee Jimenez MD

## 2025-05-29 NOTE — PLAN OF CARE
Data: Bridgett Brady transferred to [id number] via bedat 1230. Baby transferred via crib.  Action: Receiving unit notified of transfer: Yes. Patient and family notified of room change. Report given to Terry CASTANON RN at 1240. Belongings sent to receiving unit. Accompanied by Registered Nurse. Oriented patient to surroundings. Call light within reach. ID bands double-checked with receiving RN.  Response: Patient tolerated transfer and is stable.

## 2025-05-29 NOTE — PROCEDURES
Procedure Date: 2025     OPERATIVE REPORT     PREOPERATIVE DIAGNOSES:  1. 37+0wks   2.  previous  section x 2  3.  Dichorionic diamniotic twin pregnancy  4.  SROM/Labor     POSTOPERATIVE DIAGNOSES:    Same     PROCEDURE:  Repeat  section     SURGEON:  Brandee Jimenez MD     ASSISTANT:  Cecille EVANS; NATHAN Pena' asssistance was needed for retraction and visualization given repeat status and twin pregnancy to improve safety     ANESTHESIA:  spinal     QUANTITATIVE BLOOD LOSS:  665mL    COMPLICATIOS:  none     FINDINGS:  Baby A was A female infant in the occiput posterior position with a weight of 6 pounds 4 ounces and Apgars of 9 and 9 at 1 and 5 minutes respectively.  Baby B was a male infant initially in the transverse position with head to maternal right but delivered in vertex presentation from occiput anterior position.  His weight was 4lbs 14oz and Apgars were 8 and 9 at 1 and 5 minutes respectively.   Normal-appearing uterus, tubes, and bilateral ovaries.     INDICATIONS FOR PROCEDURE:  The patient is a 39-year-old,  4 Para  who was brought to Labor and Delivery at 37 weeks' gestation for spontaneous ruputure of membranes.  Bridgett's pregnancy was complicated by dichorionic diamniotic twin pregnancy.  Twins were followed with serial growth ultrasounds with [facility] and baby B was found to have slight growth lag at 35wk ultrasound.  Prenatal labs and testing were all within normal limits.       DESCRIPTION OF PROCEDURE: Bridgett was taken the operating room where spinal anesthesia was administered without difficulty.  She was then prepared and draped in the normal sterile fashion in dorsal supine position with left lateral tilt.  Timeout was performed to identify correct patient and procedure.  Pfannenstiel skin incision was made over pre-existing scar and this was carried down to the underlying layer of fascia.  Fascia was incised in the midline and this incision was extended laterally  using the Verdugo scissor.  Superior aspect of the fascial incision was then grasped with Kocher clamps x 2, elevated and the rectus muscles dissected.  Attention was similarly turned to the inferior border.  Rectus muscles were then  in the midline and the peritoneum was identified and entered sharply.  This incision was extended using gentle traction.  This tight band of peritoneal tissue was cut on the right side.  Self containing Jose retractor was then inserted and secured.  Vesicouterine peritoneum was identified tented and bladder flap created.  Bladder blade was placed in the lower uterine segment was incised in a transverse fashion.  Infant a was noted to be on mother's right side and was delivered from the vertex presentation without difficulty.  Cord was immediately clamped and infant was handed off to awaiting nurses.  Attention was then turned to baby B.  Infant was in transverse position with head to maternal right.  We were able to bring head to the incision at which time amniotomy was performed for clear fluid.  Infant was delivered from occiput anterior position without difficulty.  Cord clamping was delayed by 1 minute and baby boy infant was handed off to awaiting nurses.  Placenta was then delivered manually and intact.  The uterus was exteriorized and cleared of all clot and debris.  Uterine incision was reapproximated using an 0 Vicryl suture in a running locked stitch.  Second imbricating layer was placed using an 0 Monocryl suture.  Excellent hemostasis was obtained.  Uterus was replaced into the abdominal cavity and gutters were cleared of all clot and debris.  Peritoneum was reapproximated using a 3-0 Vicryl in a running fashion.  Fascia was reapproximated using an oh looped PDS in a running fashion.  Irrigation was performed prior to skin closure which was completed using a 4-0 Monocryl in a subcuticular stitch.  Bandages were placed and Bridgett was taken the recovery room in stable  condition.     Brandee Jimenez MD

## 2025-05-29 NOTE — ANESTHESIA POSTPROCEDURE EVALUATION
Patient: Bridgett Brady    Procedure: Procedure(s):  REPEAT  SECTION       Anesthesia Type:  Spinal    Note:     Postop Pain Control: Uneventful            Sign Out: Well controlled pain   PONV: No   Neuro/Psych: Uneventful            Sign Out: Acceptable/Baseline neuro status   Airway/Respiratory: Uneventful            Sign Out: Acceptable/Baseline resp. status   CV/Hemodynamics: Uneventful            Sign Out: Acceptable CV status   Other NRE: NONE   DID A NON-ROUTINE EVENT OCCUR?            Last vitals:  Vitals Value Taken Time   BP 99/49 25 11:15   Temp 36.3  C (97.3  F) 25 11:00   Pulse 79 25 11:15   Resp 16 25 11:15   SpO2 100 % 25 11:15       Electronically Signed By: Jose A Capone MD  May 29, 2025  1:25 PM

## 2025-05-29 NOTE — PLAN OF CARE
Goal Outcome Evaluation:      Plan of Care Reviewed With: patient    Overall Patient Progress: improvingOverall Patient Progress: improving    Vital signs stable ex slightly hypotensive (baseline). Postpartum assessment WDL. Incision CDI with steri strips. Pain controlled with scheduled tylenol and toradol. C/o nausea and itching this shift with 2 episode of emesis.  Prn zofran given and PRN nubane given. Up to bathroom this shift, slight dizziness.  Hernandez discontinued at 1815. Patient denies passing gas. Breastfeeding on cue with minimal assist. Patient and infants bonding well. Will continue with current plan of care.

## 2025-05-29 NOTE — PLAN OF CARE
" 37 week patient admitted to [facility], ambulatory per services of Dr Jimenez for evaluation of SROM/labor.  Pt states she got up to pee at 0400 and then she started leaking and it \"hasn't stopped.\" She is also elvis and when they do come on is uncomfortable.  Discussed plan of care including EFM, routine VS, ROM+, and SVE.  Pt agreeable.  EFM applied and admission assessment completed.                                "

## 2025-05-29 NOTE — PLAN OF CARE
Dr. Capone at bedside. BP's running on the lower end. Patient asymptomatic. Orders to hang 2nd bag of LR fluid.

## 2025-05-30 LAB
HGB BLD-MCNC: 9.6 G/DL (ref 11.7–15.7)
MCV RBC AUTO: 97 FL (ref 78–100)

## 2025-05-30 PROCEDURE — 36415 COLL VENOUS BLD VENIPUNCTURE: CPT | Performed by: OBSTETRICS & GYNECOLOGY

## 2025-05-30 PROCEDURE — 120N000013 HC R&B IMCU

## 2025-05-30 PROCEDURE — 250N000011 HC RX IP 250 OP 636: Mod: JZ | Performed by: OBSTETRICS & GYNECOLOGY

## 2025-05-30 PROCEDURE — 250N000013 HC RX MED GY IP 250 OP 250 PS 637: Performed by: OBSTETRICS & GYNECOLOGY

## 2025-05-30 PROCEDURE — 85018 HEMOGLOBIN: CPT | Performed by: OBSTETRICS & GYNECOLOGY

## 2025-05-30 RX ORDER — OXYCODONE HYDROCHLORIDE 5 MG/1
5 TABLET ORAL EVERY 4 HOURS PRN
Qty: 12 TABLET | Refills: 0 | Status: SHIPPED | OUTPATIENT
Start: 2025-05-30

## 2025-05-30 RX ORDER — HYDROXYZINE HYDROCHLORIDE 25 MG/1
25 TABLET, FILM COATED ORAL EVERY 6 HOURS PRN
Status: DISCONTINUED | OUTPATIENT
Start: 2025-05-30 | End: 2025-06-01 | Stop reason: HOSPADM

## 2025-05-30 RX ORDER — FERROUS SULFATE 325(65) MG
325 TABLET ORAL DAILY
Status: DISCONTINUED | OUTPATIENT
Start: 2025-05-30 | End: 2025-06-01 | Stop reason: HOSPADM

## 2025-05-30 RX ORDER — ACETAMINOPHEN 500 MG
1000 TABLET ORAL EVERY 6 HOURS PRN
Qty: 90 TABLET | Refills: 0 | Status: SHIPPED | OUTPATIENT
Start: 2025-05-30

## 2025-05-30 RX ORDER — FERROUS SULFATE 325(65) MG
325 TABLET ORAL
Qty: 30 TABLET | Refills: 0 | Status: SHIPPED | OUTPATIENT
Start: 2025-05-30

## 2025-05-30 RX ORDER — HYDROXYZINE HYDROCHLORIDE 25 MG/1
50 TABLET, FILM COATED ORAL EVERY 6 HOURS PRN
Status: DISCONTINUED | OUTPATIENT
Start: 2025-05-30 | End: 2025-06-01 | Stop reason: HOSPADM

## 2025-05-30 RX ORDER — DOCUSATE SODIUM 100 MG/1
100 CAPSULE, LIQUID FILLED ORAL 2 TIMES DAILY PRN
Qty: 60 CAPSULE | Refills: 0 | Status: SHIPPED | OUTPATIENT
Start: 2025-05-30

## 2025-05-30 RX ORDER — NALBUPHINE HYDROCHLORIDE 10 MG/ML
10 INJECTION INTRAMUSCULAR; INTRAVENOUS; SUBCUTANEOUS
Status: DISCONTINUED | OUTPATIENT
Start: 2025-05-30 | End: 2025-06-01 | Stop reason: HOSPADM

## 2025-05-30 RX ORDER — IBUPROFEN 800 MG/1
800 TABLET, FILM COATED ORAL EVERY 8 HOURS PRN
Qty: 90 TABLET | Refills: 0 | Status: SHIPPED | OUTPATIENT
Start: 2025-05-30

## 2025-05-30 RX ADMIN — SENNOSIDES AND DOCUSATE SODIUM 1 TABLET: 50; 8.6 TABLET ORAL at 09:39

## 2025-05-30 RX ADMIN — ACETAMINOPHEN 975 MG: 325 TABLET, FILM COATED ORAL at 03:15

## 2025-05-30 RX ADMIN — SENNOSIDES AND DOCUSATE SODIUM 2 TABLET: 50; 8.6 TABLET ORAL at 21:51

## 2025-05-30 RX ADMIN — ACETAMINOPHEN 975 MG: 325 TABLET, FILM COATED ORAL at 21:51

## 2025-05-30 RX ADMIN — DIPHENHYDRAMINE HYDROCHLORIDE 25 MG: 25 CAPSULE ORAL at 05:07

## 2025-05-30 RX ADMIN — IBUPROFEN 800 MG: 400 TABLET, FILM COATED ORAL at 12:45

## 2025-05-30 RX ADMIN — FERROUS SULFATE TAB 325 MG (65 MG ELEMENTAL FE) 325 MG: 325 (65 FE) TAB at 09:39

## 2025-05-30 RX ADMIN — IBUPROFEN 800 MG: 400 TABLET, FILM COATED ORAL at 18:35

## 2025-05-30 RX ADMIN — KETOROLAC TROMETHAMINE 15 MG: 15 INJECTION, SOLUTION INTRAMUSCULAR; INTRAVENOUS at 00:36

## 2025-05-30 RX ADMIN — OXYCODONE HYDROCHLORIDE 5 MG: 5 TABLET ORAL at 18:35

## 2025-05-30 RX ADMIN — ACETAMINOPHEN 975 MG: 325 TABLET, FILM COATED ORAL at 09:39

## 2025-05-30 RX ADMIN — IBUPROFEN 800 MG: 400 TABLET, FILM COATED ORAL at 06:20

## 2025-05-30 RX ADMIN — OXYCODONE HYDROCHLORIDE 5 MG: 5 TABLET ORAL at 14:27

## 2025-05-30 RX ADMIN — ACETAMINOPHEN 975 MG: 325 TABLET, FILM COATED ORAL at 14:27

## 2025-05-30 ASSESSMENT — ACTIVITIES OF DAILY LIVING (ADL)
ADLS_ACUITY_SCORE: 16
ADLS_ACUITY_SCORE: 20
ADLS_ACUITY_SCORE: 16
ADLS_ACUITY_SCORE: 17
ADLS_ACUITY_SCORE: 16
ADLS_ACUITY_SCORE: 20
ADLS_ACUITY_SCORE: 17
ADLS_ACUITY_SCORE: 20
ADLS_ACUITY_SCORE: 16
ADLS_ACUITY_SCORE: 16
ADLS_ACUITY_SCORE: 17
ADLS_ACUITY_SCORE: 16

## 2025-05-30 NOTE — PROGRESS NOTES
[facility]    Obstetrics Post-Op / Progress Note    Assessment & Plan   Assessment:  -1 Day Post-Op  Procedure(s):  REPEAT  SECTION    Doing well.  No excessive bleeding  Pain well-controlled.    Plan:  Ambulation encouraged  Monitor wound for signs of infection  Pain control measures as needed  Reportable signs and symptoms dicussed with the patient  Start iron supplementation for mild acute blood loss anemia (11.8-->9.6g/dL)    Brandee Jimenez MD     Interval History   Doing well.  Pain is well-controlled.  No fevers.  No history of wound drainage, warmth or significant erythema.  Good appetite.  Denies chest pain, shortness of breath, nausea or vomiting.  Ambulatory.  Breastfeeding well.  Itching has been very bothersome but otherwise doing well    Medications   Current Facility-Administered Medications   Medication Dose Route Frequency Provider Last Rate Last Admin    dextrose 5% in lactated ringers infusion   Intravenous Continuous Brandee Jimenez  mL/hr at 25 1355 New Bag at 25 1355    lactated ringers infusion   Intravenous Continuous Jose A Capone  mL/hr at 25 1052 New Bag at 25 1052    oxytocin (PITOCIN) 30 units in 500 mL 0.9% NaCl infusion  340 mL/hr Intravenous Continuous PRN Brandee Jimenez MD         Current Facility-Administered Medications   Medication Dose Route Frequency Provider Last Rate Last Admin    acetaminophen (TYLENOL) tablet 975 mg  975 mg Oral Q6H Brandee Jimenez MD   975 mg at 25 0315    ferrous sulfate (FEROSUL) tablet 325 mg  325 mg Oral Daily Brandee Jimenez MD        ibuprofen (ADVIL/MOTRIN) tablet 800 mg  800 mg Oral Q6H Brandee Jimenez MD   800 mg at 25 0620    senna-docusate (SENOKOT-S/PERICOLACE) 8.6-50 MG per tablet 1 tablet  1 tablet Oral BID Brandee Jimenez MD   1 tablet at 25    Or    senna-docusate (SENOKOT-S/PERICOLACE) 8.6-50 MG per tablet 2 tablet  2  tablet Oral BID Brandee Jimenez MD        sodium chloride (PF) 0.9% PF flush 3 mL  3 mL Intracatheter Q8H [facility] Brandee Jimenez MD   3 mL at 05/30/25 0512       Physical Exam   Temp: 97.3  F (36.3  C) Temp src: Oral BP: 94/47 Pulse: 74   Resp: 16 SpO2: 99 % O2 Device: None (Room air)    Vitals:    05/29/25 0745   Weight: 96.6 kg (213 lb)     Vital Signs with Ranges  Temp:  [97.3  F (36.3  C)-98  F (36.7  C)] 97.3  F (36.3  C)  Pulse:  [64-81] 74  Resp:  [11-28] 16  BP: ()/(43-57) 94/47  SpO2:  [96 %-100 %] 99 %  I/O last 3 completed shifts:  In: 2681 [P.O.:1200; I.V.:1481]  Out: 3015 [Urine:2150; Emesis/NG output:200; Blood:665]    Uterine fundus is firm, non-tender and at the level of the umbilicus  Incision C/D/I  Extremities Non-tender    Data   Recent Labs   Lab Test 05/29/25  0807 07/28/21  0537 12/28/20  0902   ABO  --   --  O   RH  --   --  Pos   AS Negative   < > Neg    < > = values in this interval not displayed.     Recent Labs   Lab Test 05/30/25  0617 05/29/25  0807   HGB 9.6* 11.8     Recent Labs   Lab Test 11/26/24  1633   RUQIGG Positive

## 2025-05-30 NOTE — LACTATION NOTE
Initial visit with Bridgett and rod.  Baby boy latched well with lips flanged on the right breast at time of visit,  Baby girl is latched on well to the left breast.  Tandem feeding and swallows heard with both babies.   her first 2 children for a year each.  Questions answered regarding pumping and physiology of milk supply and production.   Instructed on signs/symptoms of engorgement/ plugged ducts and mastitis.  Instructed on comfort measures and when to call MD.     Breastfeeding general information reviewed.   Advised to breastfeed exclusively, on demand, avoid pacifiers, bottles and formula unless medically indicated.  Encouraged rooming in, skin to skin, feeding on demand 8-12x/day or sooner if baby cues.  Explained benefits of holding and skin to skin.  Encouraged lots of skin to skin. Instructed on hand expression.   Continues to nurse well per mom. No further questions at this time.   Will follow as needed.   Monae Martinez BSN, RN, PHN, RNC-MNN, IBCLC

## 2025-05-30 NOTE — PLAN OF CARE
[facility] - Payton Walters MD  [address] [facility] [facility] [address] [facility]  [phone] Vitals within defined limits.  dressing c/d/I, ice applied intermittently. Fundus firm. Lochia scant. Using Tylenol & Toradol/Motrin for incisional pain as well as uterine cramping pain with good relief. Abdominal binder given. Lung sounds clear. Bowel sounds normoactive, patient unable to pass gas. Up ad [place], encouraging pt to ambulate. Voiding without issues. Breastfeeding every 3 hours. Started pt pumping when twins had attempted breastfeeds.

## 2025-05-31 PROCEDURE — 250N000013 HC RX MED GY IP 250 OP 250 PS 637: Performed by: OBSTETRICS & GYNECOLOGY

## 2025-05-31 PROCEDURE — 120N000013 HC R&B IMCU

## 2025-05-31 RX ORDER — OXYTOCIN/0.9 % SODIUM CHLORIDE 30/500 ML
100-340 PLASTIC BAG, INJECTION (ML) INTRAVENOUS CONTINUOUS PRN
Status: DISCONTINUED | OUTPATIENT
Start: 2025-05-31 | End: 2025-06-01 | Stop reason: HOSPADM

## 2025-05-31 RX ORDER — OXYTOCIN 10 [USP'U]/ML
10 INJECTION, SOLUTION INTRAMUSCULAR; INTRAVENOUS
Status: DISCONTINUED | OUTPATIENT
Start: 2025-05-31 | End: 2025-06-01 | Stop reason: HOSPADM

## 2025-05-31 RX ADMIN — OXYCODONE HYDROCHLORIDE 5 MG: 5 TABLET ORAL at 00:33

## 2025-05-31 RX ADMIN — IBUPROFEN 800 MG: 400 TABLET, FILM COATED ORAL at 06:37

## 2025-05-31 RX ADMIN — IBUPROFEN 800 MG: 400 TABLET, FILM COATED ORAL at 12:51

## 2025-05-31 RX ADMIN — ACETAMINOPHEN 975 MG: 325 TABLET, FILM COATED ORAL at 23:17

## 2025-05-31 RX ADMIN — OXYCODONE HYDROCHLORIDE 5 MG: 5 TABLET ORAL at 23:17

## 2025-05-31 RX ADMIN — OXYCODONE HYDROCHLORIDE 5 MG: 5 TABLET ORAL at 10:50

## 2025-05-31 RX ADMIN — OXYCODONE HYDROCHLORIDE 5 MG: 5 TABLET ORAL at 14:54

## 2025-05-31 RX ADMIN — SENNOSIDES AND DOCUSATE SODIUM 1 TABLET: 50; 8.6 TABLET ORAL at 23:17

## 2025-05-31 RX ADMIN — ACETAMINOPHEN 975 MG: 325 TABLET, FILM COATED ORAL at 03:37

## 2025-05-31 RX ADMIN — IBUPROFEN 800 MG: 400 TABLET, FILM COATED ORAL at 00:30

## 2025-05-31 RX ADMIN — IBUPROFEN 800 MG: 400 TABLET, FILM COATED ORAL at 19:19

## 2025-05-31 RX ADMIN — OXYCODONE HYDROCHLORIDE 5 MG: 5 TABLET ORAL at 19:19

## 2025-05-31 RX ADMIN — FERROUS SULFATE TAB 325 MG (65 MG ELEMENTAL FE) 325 MG: 325 (65 FE) TAB at 12:52

## 2025-05-31 RX ADMIN — ACETAMINOPHEN 975 MG: 325 TABLET, FILM COATED ORAL at 10:49

## 2025-05-31 RX ADMIN — OXYCODONE HYDROCHLORIDE 5 MG: 5 TABLET ORAL at 06:38

## 2025-05-31 RX ADMIN — ACETAMINOPHEN 975 MG: 325 TABLET, FILM COATED ORAL at 16:58

## 2025-05-31 RX ADMIN — SENNOSIDES AND DOCUSATE SODIUM 2 TABLET: 50; 8.6 TABLET ORAL at 12:56

## 2025-05-31 ASSESSMENT — ACTIVITIES OF DAILY LIVING (ADL)
ADLS_ACUITY_SCORE: 24
ADLS_ACUITY_SCORE: 26
ADLS_ACUITY_SCORE: 24
ADLS_ACUITY_SCORE: 26
ADLS_ACUITY_SCORE: 26
ADLS_ACUITY_SCORE: 24
ADLS_ACUITY_SCORE: 24
ADLS_ACUITY_SCORE: 26
ADLS_ACUITY_SCORE: 26
ADLS_ACUITY_SCORE: 24
ADLS_ACUITY_SCORE: 26
ADLS_ACUITY_SCORE: 24
ADLS_ACUITY_SCORE: 26
ADLS_ACUITY_SCORE: 24
ADLS_ACUITY_SCORE: 24
ADLS_ACUITY_SCORE: 26
ADLS_ACUITY_SCORE: 26
ADLS_ACUITY_SCORE: 24
ADLS_ACUITY_SCORE: 20

## 2025-05-31 NOTE — PROGRESS NOTES
[facility]    Obstetrics Post-Op / Progress Note    Assessment & Plan   Assessment:  -2 Days Post-Op  Procedure(s):  REPEAT  SECTION    Doing well.  No excessive bleeding  Pain well-controlled.    Plan:  Ambulation encouraged  Monitor wound for signs of infection  Pain control measures as needed  Reportable signs and symptoms dicussed with the patient  Iron supplementation for mild acute blood loss anemia (11.8-->9.6g/dL)    Dana Navarrete Masters, DO         Interval History   Doing well.  Pain is well-controlled.  No fevers.  No history of wound drainage, warmth or significant erythema.  Good appetite.  Denies chest pain, shortness of breath, nausea or vomiting.  Ambulatory.  Breastfeeding well.  +Flatus.    Medications   Current Facility-Administered Medications   Medication Dose Route Frequency Provider Last Rate Last Admin    dextrose 5% in lactated ringers infusion   Intravenous Continuous Brandee Jimenez  mL/hr at 25 1355 New Bag at 25 1355    lactated ringers infusion   Intravenous Continuous Jose A Capone  mL/hr at 25 1052 New Bag at 25 1052    oxytocin (PITOCIN) 30 units in 500 mL 0.9% NaCl infusion  340 mL/hr Intravenous Continuous PRN Brandee Jimenez MD         Current Facility-Administered Medications   Medication Dose Route Frequency Provider Last Rate Last Admin    acetaminophen (TYLENOL) tablet 975 mg  975 mg Oral Q6H Brandee Jimenez MD   975 mg at 25 0337    ferrous sulfate (FEROSUL) tablet 325 mg  325 mg Oral Daily Brandee Jimenez MD   325 mg at 25 0939    ibuprofen (ADVIL/MOTRIN) tablet 800 mg  800 mg Oral Q6H Brandee Jimenez MD   800 mg at 25 0637    senna-docusate (SENOKOT-S/PERICOLACE) 8.6-50 MG per tablet 1 tablet  1 tablet Oral BID Brandee Jimenez MD   1 tablet at 25 0939    Or    senna-docusate (SENOKOT-S/PERICOLACE) 8.6-50 MG per tablet 2 tablet  2 tablet Oral BID  Brandee Jimenez MD   2 tablet at 05/30/25 2151    sodium chloride (PF) 0.9% PF flush 3 mL  3 mL Intracatheter Q8H [facility] Brandee Jimenez MD   3 mL at 05/30/25 0512       Physical Exam   Temp: 98.1  F (36.7  C) Temp src: Oral BP: 114/53 Pulse: 88   Resp: 16        Vitals:    05/29/25 0745   Weight: 96.6 kg (213 lb)     Vital Signs with Ranges  Temp:  [98  F (36.7  C)-98.1  F (36.7  C)] 98.1  F (36.7  C)  Pulse:  [73-88] 88  Resp:  [16-18] 16  BP: ()/(47-53) 114/53  No intake/output data recorded.    Uterine fundus is firm, non-tender and at the level of the umbilicus  Incision C/D/I with steri strips, no erythema  Extremities Non-tender, trace b/l LE edma    Data   Recent Labs   Lab Test 05/29/25  0807 07/28/21  0537 12/28/20  0902   ABO  --   --  O   RH  --   --  Pos   AS Negative   < > Neg    < > = values in this interval not displayed.     Recent Labs   Lab Test 05/30/25  0617 05/29/25  0807   HGB 9.6* 11.8     Recent Labs   Lab Test 11/26/24  1633   RUQIGG Positive

## 2025-05-31 NOTE — DISCHARGE INSTRUCTIONS
Section: What to Expect at Home  Your Recovery     A  section, or , is surgery to deliver your baby through a cut that the doctor makes in your lower belly and uterus. The cut is called an incision.  You may have some pain in your lower belly and need pain medicine for 1 to 2 weeks. You can expect some vaginal bleeding for several weeks. You will probably need about 6 weeks to fully recover.  It's important to take it easy while the incision heals. Avoid heavy lifting, strenuous activities, and exercises that strain the belly muscles while you recover. Ask a family member or friend for help with housework, cooking, and shopping.  This care sheet gives you a general idea about how long it will take for you to recover. But each person recovers at a different pace. Follow the steps below to get better as quickly as possible.  How can you care for yourself at home?  Activity       Rest when you feel tired. Getting enough sleep will help you recover.        Try to walk each day. Start by walking a little more than you did the day before. Bit by bit, increase the amount you walk. Walking boosts blood flow and helps prevent pneumonia, constipation, and blood clots.        Avoid strenuous activities, such as bicycle riding, jogging, weightlifting, and aerobic exercise, for 6 weeks or until your doctor says it is okay.        Until your doctor says it is okay, do not lift anything heavier than your baby.        Do not do sit-ups or other exercises that strain the belly muscles for 6 weeks or until your doctor says it is okay.        Hold a pillow over your incision when you cough or take deep breaths. This will support your belly and decrease your pain.        You may shower as usual. Pat the incision dry when you are done.        You will have some vaginal bleeding. Wear sanitary pads. Do not douche or use tampons until your doctor says it is okay.        Ask your doctor when you can drive again.         You will probably need to take at least 6 weeks off work. It depends on the type of work you do and how you feel.        Ask your doctor when it is okay for you to have sex.   Diet       You can eat your normal diet. If your stomach is upset, try bland, low-fat foods like plain rice, broiled chicken, toast, and yogurt.        Drink plenty of fluids (unless your doctor tells you not to).        You may notice that your bowel movements are not regular right after your surgery. This is common. Try to avoid constipation and straining with bowel movements. You may want to take a fiber supplement every day. If you have not had a bowel movement after a couple of days, ask your doctor about taking a mild laxative.        If you are breastfeeding, limit alcohol. Alcohol can cause a lack of energy and other health problems for the baby when a breastfeeding woman drinks heavily. It can also get in the way of a mom's ability to feed her baby or to care for the child in other ways. There isn't a lot of research about exactly how much alcohol can harm a baby. Having no alcohol is the safest choice for your baby. If you choose to have a drink now and then, have only one drink, and limit the number of occasions that you have a drink. Wait to breastfeed at least 2 hours after you have a drink to reduce the amount of alcohol the baby may get in the milk.   Medicines       Your doctor will tell you if and when you can restart your medicines. You will also get instructions about taking any new medicines.        If you stopped taking aspirin or some other blood thinner, your doctor will tell you when to start taking it again.        Take pain medicines exactly as directed.  If the doctor gave you a prescription medicine for pain, take it as prescribed.  If you are not taking a prescription pain medicine, ask your doctor if you can take an over-the-counter medicine.        If you think your pain medicine is making you sick to your  stomach:  Take your medicine after meals (unless your doctor has told you not to).  Ask your doctor for a different pain medicine.        If your doctor prescribed antibiotics, take them as directed. Do not stop taking them just because you feel better. You need to take the full course of antibiotics.   Incision care       If you have strips of tape on the incision, leave the tape on for a week or until it falls off.        Wash the area daily with warm, soapy water, and pat it dry. Don't use hydrogen peroxide or alcohol, which can slow healing. You may cover the area with a gauze bandage if it weeps or rubs against clothing. Change the bandage every day.        Keep the area clean and dry.   Other instructions       If you breastfeed your baby, you may be more comfortable while you are healing if you don't rest your baby on your belly. Try tucking your baby under your arm, with your baby's body along the side you will be feeding on. Support your baby's upper body with your arm. With that hand you can control your baby's head to bring your baby's mouth to your breast. This is sometimes called the football hold.   Follow-up care is a key part of your treatment and safety. Be sure to make and go to all appointments, and call your doctor if you are having problems. It's also a good idea to know your test results and keep a list of the medicines you take.  When should you call for help?  Share this information with your partner, family, or a friend. They can help you watch for warning signs.  Call 911  anytime you think you may need emergency care. For example, call if:       You feel you cannot stop from hurting yourself, your baby, or someone else.        You passed out (lost consciousness).        You have chest pain, are short of breath, or cough up blood.        You have a seizure.   Where to get help 24 hours a day, 7 days a week   If you or someone you know talks about suicide, self-harm, a mental health crisis,  a substance use crisis, or any other kind of emotional distress, get help right away. You can:       Call the Suicide and Crisis Lifeline at 988.        Call 8-457-219-TALK (1-347.717.4013).        Text HOME to 658448 to access the Crisis Text Line.   Consider saving these numbers in your phone.  Go to [employer] for more information or to chat online.  Call your doctor or midwife now or seek immediate medical care if:       You have loose stitches, or your incision comes open.        You have signs of hemorrhage (too much bleeding), such as:  Heavy vaginal bleeding. This means that you are soaking through one or more pads in an hour. Or you pass blood clots bigger than an egg.  Feeling dizzy or lightheaded, or you feel like you may faint.  Feeling so tired or weak that you cannot do your usual activities.  A fast or irregular heartbeat.  New or worse belly pain.        You have symptoms of infection, such as:  Increased pain, swelling, warmth, or redness.  Red streaks leading from the incision.  Pus draining from the incision.  A fever.  Frequent or painful urination or blood in your urine.  Vaginal discharge that smells bad.  New or worse belly pain.        You have symptoms of a blood clot in your leg (called a deep vein thrombosis), such as:  Pain in the calf, back of the knee, thigh, or groin.  Swelling in the leg or groin.  A color change on the leg or groin. The skin may be reddish or purplish, depending on your usual skin color.        You have signs of preeclampsia, such as:  Sudden swelling of your face, hands, or feet.  New vision problems (such as dimness, blurring, or seeing spots).  A severe headache.        You have signs of heart failure, such as:  New or increased shortness of breath.  New or worse swelling in your legs, ankles, or feet.  Sudden weight gain, such as more than 2 to 3 pounds in a day or 5 pounds in a week.  Feeling so tired or weak that you cannot do your usual activities.       "  You had spinal or epidural pain relief and have:  New or worse back pain.  Increased pain, swelling, warmth, or redness at the injection site.  Tingling, weakness, or numbness in your legs or groin.   Watch closely for changes in your health, and be sure to contact your doctor or midwife if:       Your vaginal bleeding isn't decreasing.        You feel sad, anxious, or hopeless for more than a few days.        You are having problems with your breasts or breastfeeding.   Where can you learn more?  Go to https://www.[facility].net/patiented  Enter M806 in the search box to learn more about \" Section: What to Expect at Home.\"  Current as of: 2024  Content Version: 14.4    6641-0911 [employer].   Care instructions adapted under license by your healthcare professional. If you have questions about a medical condition or this instruction, always ask your healthcare professional. [employer] disclaims any warranty or liability for your use of this information.    DISCHARGE INSTRUCTIONS    Medications:  -May take Ibuprofen (800mg by mouth every 8 hours as needed for pain) or tylenol (500mg by mouth every 6 hours as needed for pain; max 4000mg per day) when at home as needed for pain/cramps/headaches/pain, follow instructions on bottle.  -You may use miralax (daily) or docusate (one tab by mouth twice daily) for stool softening, these are over the counter and can be found at any pharmacy. Follow bottle instructions.  -Continue prenatal vitamins.     Activity:  -Pelvic rest (no sex, tampons) for 6 weeks.   -General activity as tolerated, slowing increase daily. Avoid vigorous exercise, jumping or strength training for at least 4-6 weeks. Limit lifting to 10-15lb or less. You may drive when you are able to safely operate a motor vehicle and are no longer taking narcotic medications if they have been prescribed for you.    Precuations:  -Call doctor if heavy unexpected bleeding, fever " of 100.4 or greater, foul vaginal discharge, severe abdominal pain not helped with medications or for any other concerns or questions. If you are having headaches not resolved by tylenol or ibuprofen, new or different vision changes then notify your doctor.    Follow Up Visit:  -Call the [facility] to schedule your 6 week postpartum appointment, [phone].

## 2025-05-31 NOTE — PLAN OF CARE
Goal Outcome Evaluation:      Plan of Care Reviewed With: patient     Patient doing well, having adequate pain control with tylenol, ibuprofen, and oxycodone PO.  Vital signs stable.  Ambulating without difficulty.  Passing flatus.  Using abdominal binder for comfort.  Attempting to breast feed twins, tandem when able.  Assisted with positioning prn.  Using breast pump every 3 hours.  Lanolin cream given for tender nipples.  PO iron given per orders.  Encouraged to call with questions/concerns.

## 2025-05-31 NOTE — PLAN OF CARE
Goal Outcome Evaluation:      Plan of Care Reviewed With: patient      Patient doing well, having adequate pain control with tylenol, ibuprofen and oxycodone PO.  Abdominal binder given for comfort.  Ambulating hallways without difficulty.  Incision CDI with steri strips.  Fundus firm below umbilicus, lochia scant.  Encouraged to call for help with breast feeding prn with twins.  Will continue to monitor.

## 2025-05-31 NOTE — PLAN OF CARE
Goal Outcome Evaluation:      Plan of Care Reviewed With: patient    Overall Patient Progress: improving    VSS on RA.  Fundus firm, midline, U/2.  Scant lochia rubra.  Incision WNL.  Pt is able to empty bladder independently. Voiding adequately.  Up independently.  Pain managed w/Tylenol, Ibuprofen, and Oxycodone.  Pt breastfeeding and supplementing with donor milk by finger feeding for infant B.  Bonding well w/infants. Encouraging independence w/infant cares.  Spouse at bedside.  Nursing to continue to monitor.

## 2025-05-31 NOTE — LACTATION NOTE
Routine visit. Breast feeding for 10 minutes then pumping and bottle feeding her EBM and HDM.  No further questions at this time. Very appreciative of visit .  Will follow as needed. Monae RODRIGUEZN, RN, PHN, RNC-MNN, IBCLC

## 2025-06-01 VITALS
RESPIRATION RATE: 16 BRPM | WEIGHT: 204 LBS | BODY MASS INDEX: 32.02 KG/M2 | OXYGEN SATURATION: 99 % | TEMPERATURE: 98.3 F | DIASTOLIC BLOOD PRESSURE: 62 MMHG | SYSTOLIC BLOOD PRESSURE: 107 MMHG | HEART RATE: 77 BPM | HEIGHT: 67 IN

## 2025-06-01 PROCEDURE — 250N000013 HC RX MED GY IP 250 OP 250 PS 637: Performed by: OBSTETRICS & GYNECOLOGY

## 2025-06-01 RX ADMIN — FERROUS SULFATE TAB 325 MG (65 MG ELEMENTAL FE) 325 MG: 325 (65 FE) TAB at 08:12

## 2025-06-01 RX ADMIN — IBUPROFEN 800 MG: 400 TABLET, FILM COATED ORAL at 08:12

## 2025-06-01 RX ADMIN — IBUPROFEN 800 MG: 400 TABLET, FILM COATED ORAL at 02:02

## 2025-06-01 RX ADMIN — OXYCODONE HYDROCHLORIDE 5 MG: 5 TABLET ORAL at 05:13

## 2025-06-01 RX ADMIN — ACETAMINOPHEN 975 MG: 325 TABLET, FILM COATED ORAL at 11:12

## 2025-06-01 RX ADMIN — ACETAMINOPHEN 975 MG: 325 TABLET, FILM COATED ORAL at 05:13

## 2025-06-01 RX ADMIN — OXYCODONE HYDROCHLORIDE 5 MG: 5 TABLET ORAL at 11:12

## 2025-06-01 RX ADMIN — SENNOSIDES AND DOCUSATE SODIUM 2 TABLET: 50; 8.6 TABLET ORAL at 08:12

## 2025-06-01 ASSESSMENT — ACTIVITIES OF DAILY LIVING (ADL)
ADLS_ACUITY_SCORE: 26

## 2025-06-01 NOTE — LACTATION NOTE
Routine visit. Babies latching well and then bottle feeding up to 20ml of HDM, and EBM.   Instructed on signs/symptoms of engorgement/ plugged ducts and mastitis.  Instructed on comfort measures and when to call MD.  Getting ready for discharge.  Plan: Watch for feeding cues and feed every 2-3 hours and/or on demand. Continue to use feeding log to track intake and appropriate voids and stools. Take feeding log to first follow up appointment or weight check. Encourage skin to skin to promote frequent feedings, thermoregulation and bonding. Follow-up with healthcare provider or lactation consultant for questions or concerns.    Continues to nurse well per mom. No further questions at this time.   Will follow as needed.   Monae Martinez BSN, RN, PHN, RNC-MNN, IBCLC

## 2025-06-01 NOTE — PLAN OF CARE
Goal Outcome Evaluation:      Plan of Care Reviewed With: patient, spouse    Overall Patient Progress: improvingOverall Patient Progress: improving    VSS. Pain controlled with tylenol, ibuprofen and oxycodone. Infants breastfeeding well, milk coming in. Reviewed pumping expectations over the next few days. Since infants requiring supplement, encourage Bridgett to continue to pump after feedings now, until outpatient lactation visit can determine infant transfer at breast and ability to wean down pumping. Family eager for discharge today.

## 2025-06-01 NOTE — PROGRESS NOTES
[facility]    Obstetrics Post-Op / Progress Note    Assessment & Plan   Assessment:  -3 Days Post-Op  Procedure(s):  REPEAT  SECTION    Doing well.  No excessive bleeding  Pain well-controlled.    Plan:  Ambulation encouraged  Monitor wound for signs of infection  Pain control measures as needed  Reportable signs and symptoms dicussed with the patient  Iron supplementation for mild acute blood loss anemia (11.8-->9.6g/dL)  Discharge home today. Precautions reviewed. Follow-up 6wk.    Dana Navarrete Masters, DO         Interval History   Doing well.  Pain is well-controlled.  No fevers.  No history of wound drainage, warmth or significant erythema.  Good appetite.  Denies chest pain, shortness of breath, nausea or vomiting.  Ambulatory.  Breastfeeding well.  +Flatus.  Ready for discharge today    Medications   Current Facility-Administered Medications   Medication Dose Route Frequency Provider Last Rate Last Admin    dextrose 5% in lactated ringers infusion   Intravenous Continuous Brandee Jimenez  mL/hr at 25 1355 New Bag at 25 1355    lactated ringers infusion   Intravenous Continuous Jos eA Capone  mL/hr at 25 1052 New Bag at 25 1052    oxytocin (PITOCIN) 30 units in 500 mL 0.9% NaCl infusion  100-340 mL/hr Intravenous Continuous PRN Brandee Jimenez MD        oxytocin (PITOCIN) 30 units in 500 mL 0.9% NaCl infusion  340 mL/hr Intravenous Continuous PRN Brandee Jimenez MD         Current Facility-Administered Medications   Medication Dose Route Frequency Provider Last Rate Last Admin    acetaminophen (TYLENOL) tablet 975 mg  975 mg Oral Q6H Brandee Jimenez MD   975 mg at 25 0513    ferrous sulfate (FEROSUL) tablet 325 mg  325 mg Oral Daily Brandee Jimenez MD   325 mg at 25 1252    ibuprofen (ADVIL/MOTRIN) tablet 800 mg  800 mg Oral Q6H Brandee Jimenez MD   800 mg at 25 0202    senna-docusate  (SENOKOT-S/PERICOLACE) 8.6-50 MG per tablet 1 tablet  1 tablet Oral BID Brandee Jimenez MD   1 tablet at 05/31/25 2317    Or    senna-docusate (SENOKOT-S/PERICOLACE) 8.6-50 MG per tablet 2 tablet  2 tablet Oral BID Brandee Jimenez MD   2 tablet at 05/31/25 1256    sodium chloride (PF) 0.9% PF flush 3 mL  3 mL Intracatheter Q8H [facility] Brandee Jimenez MD   3 mL at 05/30/25 0512       Physical Exam   Temp: 98.3  F (36.8  C) Temp src: Oral BP: 107/62 Pulse: 77   Resp: 16   O2 Device: None (Room air)    Vitals:    05/29/25 0745   Weight: 96.6 kg (213 lb)     Vital Signs with Ranges  Temp:  [98  F (36.7  C)-98.3  F (36.8  C)] 98.3  F (36.8  C)  Pulse:  [72-83] 77  Resp:  [16] 16  BP: (102-111)/(51-62) 107/62  No intake/output data recorded.    Uterine fundus is firm, non-tender and at the level of the umbilicus  Incision C/D/I with steri strips, no erythema  Extremities Non-tender, trace b/l LE edma    Data   Recent Labs   Lab Test 05/29/25  0807 07/28/21  0537 12/28/20  0902   ABO  --   --  O   RH  --   --  Pos   AS Negative   < > Neg    < > = values in this interval not displayed.     Recent Labs   Lab Test 05/30/25  0617 05/29/25  0807   HGB 9.6* 11.8     Recent Labs   Lab Test 11/26/24  1633   RUQIGG Positive

## 2025-06-01 NOTE — PLAN OF CARE
Vital signs stable. Postpartum assessment WDL. Incision with steri-strips CDI. Pain controlled with Tylenol, Ibuprofen, and Oxycodone. Patient ambulating independently and voiding adequately. Breastfeeding without assist, pumping, and supplementing newborns with EBM/DBM via bottle. Patient and infants bonding well. Will continue with current plan of care.

## 2025-06-01 NOTE — PLAN OF CARE
Goal Outcome Evaluation:      Plan of Care Reviewed With: patient, spouse    Overall Patient Progress: improvingOverall Patient Progress: improving     Discharge outcomes met. Home medications given to patient and discharge instructions reviewed with patient and .

## 2025-06-03 NOTE — TELEPHONE ENCOUNTER
Completed forms have been emailed to  [email or web address] . A copy has been scanned into patients chart and original is filed in the drawer at check out.

## 2025-06-04 ENCOUNTER — TELEPHONE (OUTPATIENT)
Dept: OBGYN | Facility: CLINIC | Age: 40
End: 2025-06-04
Payer: COMMERCIAL

## 2025-06-04 NOTE — TELEPHONE ENCOUNTER
5/29: Delivery C/S Twins  Noting increase in swelling    LMTCB  Dimitri Sifuentes RN on 6/4/2025 at 9:13 AM   [facility]      
Caller reporting the following red-flag symptom(s): post partum (twins) feet and legs are very swollen    Per the system red-flag symptom policy, patient was instructed to:  speak with a Registered Nurse    Action:  Sending TE per secure chat request    
Pt returning call    5/29: Delivery C/S Twins  Noting increase in swelling     Noticed this started about two days ago.  Denies any headaches, blurred vision, URQ pain.  Denies any swelling in pregnancy.    Does have a BP cuff at home, has not used it the last few days  Instructed to take BP at this time.  BP this /64 now    Discussed some increase in swelling PP can be normal especially after a C/S  Discussed staying well hydrated, moving around the house as much as able every few hours to keep blood flow moving, elevating legs when sitting, and compression stockings during the day.    Reviewed s/s of when to call and ER precautions reviewed.  Pt comfortable with recommendations.  Pt verbalized understanding, in agreement with plan, and voiced no further questions.    Routing to provider as FYI unless anything further to add.  Dimitri Sifuentes RN on 6/4/2025 at 9:31 AM   [facility]      
Reiterated Dr Jimenez recommendations with pt  In agreement with plan.  Dimitri Sifuentes RN on 6/4/2025 at 9:38 AM   [facility]    
Swelling very normal and tends to peak in days 5-10 after c/s.  Lots of hydration and can use compression stockings to help.  Keep feet elevated when resting.  Watch salt intake --usually takes a few days for her body/kidneys to get rid of the extra fluid.  
Abdominal Pain, N/V/D

## 2025-07-08 PROBLEM — O30.049 DICHORIONIC DIAMNIOTIC TWIN PREGNANCY, ANTEPARTUM: Status: RESOLVED | Noted: 2025-03-28 | Resolved: 2025-07-08

## 2025-07-08 PROBLEM — O34.219 PREVIOUS CESAREAN DELIVERY, ANTEPARTUM: Status: RESOLVED | Noted: 2024-11-21 | Resolved: 2025-07-08

## 2025-07-08 PROBLEM — O30.043 DICHORIONIC DIAMNIOTIC TWIN PREGNANCY IN THIRD TRIMESTER: Status: RESOLVED | Noted: 2025-05-29 | Resolved: 2025-07-08

## 2025-07-08 NOTE — PROGRESS NOTES
"SUBJECTIVE:  Bridgett Brady,  is here for a postpartum visit.  She had a  Section  on 25 delivering a healthy baby twins, one boy and one girl, named Nuno and Patricia weighing 4 lbs 13 oz and 6 lbs 4 oz at term at term.      HPI:  Here today for postpartum visit --doing really well!  Both babies have been very good --Nuno up to 5-14# and needing some supplementing and Patricia doing only breast feeding!  Bridgett is doing well --exhausted but a good recovery.  No pain or incisional issues.  Eating/drinking well.  No bowel/bladder issues.  Vaginal bleeding stopped ~2wks ago.  Has not resumed SA.   has vasectomy scheduled next week.  Moods good --occ shorter fuse/temper with fatigue but overall feeling like she is doing really well  Returns to work after Labor Day      Last PHQ-9 score on record=       2025     9:20 AM   PHQ-9 SCORE   PHQ-9 Total Score 1         2024     1:47 PM 10/29/2024     3:34 PM 2025     9:20 AM   BREANNE-7 SCORE   Total Score 1 0 0       Pap: (No results found for: \"GYNINTERP\", \"PAP\" )      Delivery complications:  No  Breast feeding:  Yes, pumping and bottle feeding and supplementing with formula for Nuno  Bladder problems:  No  Bowel problems/hemorrhoids:  No  Episiotomy/laceration/incision healed? Yes  Vaginal flow:  None  [place]:  No  Contraception: vasectomy  Emotional adjustment:  doing well and happy  Back to work:  Goes back to work after Labor Day    12 point review of systems negative other than symptoms noted below or in the HPI.  Breast: Lumps    OBJECTIVE:  Vitals: /78   Wt 83.5 kg (184 lb)   LMP 2024   Breastfeeding Yes   BMI 28.82 kg/m    BMI= Body mass index is 28.82 kg/m .  General - pleasant female in no acute distress.  Breast -  Right breast wnl; no lump or mass noted  Abdomen - Incision well-healed  Pelvic - EG: normal adult female, BUS: within normal limits, Vagina: well rugated, no discharge, Cervix: no lesions or " CMT, Uterus: firm, normal sized and nontender, midplane in position. Adnexae: no masses or tenderness.  Rectovaginal - deferred.    ASSESSMENT:    ICD-10-CM    1. Encounter for postpartum visit  Z39.2       2. History of anemia  Z86.2 Hemoglobin     Hemoglobin      3. Status post  delivery  Z98.891           PLAN:  May resume normal activities without restrictions.  Pap smear was not done today.    Full counseling was provided, and all questions were answered.   Return to clinic in one year for an annual visit.     Patient Instructions   Follow up with your primary care provider for your other medical problems.  Continue self breast exam.  Increase physical activity and exercise.  Lab results will be called to the patient.  Usual safety and preventative measures counseling done.  Weight loss encouraged.  Last pap smear () was normal and negative for the DNA of high risk HPV subtypes.  No pap was obtained this year.  This was discussed with the patient and she agrees with the plan.  May resume all normal activities.   Brandee Jimenez MD

## 2025-07-09 ENCOUNTER — PRENATAL OFFICE VISIT (OUTPATIENT)
Dept: OBGYN | Facility: CLINIC | Age: 40
End: 2025-07-09
Payer: COMMERCIAL

## 2025-07-09 VITALS — BODY MASS INDEX: 28.82 KG/M2 | SYSTOLIC BLOOD PRESSURE: 116 MMHG | DIASTOLIC BLOOD PRESSURE: 78 MMHG | WEIGHT: 184 LBS

## 2025-07-09 DIAGNOSIS — Z98.891 STATUS POST CESAREAN DELIVERY: ICD-10-CM

## 2025-07-09 DIAGNOSIS — Z86.2 HISTORY OF ANEMIA: ICD-10-CM

## 2025-07-09 LAB
HGB BLD-MCNC: 13 G/DL (ref 11.7–15.7)
MCV RBC AUTO: 99 FL (ref 78–100)

## 2025-07-09 PROCEDURE — 3074F SYST BP LT 130 MM HG: CPT | Performed by: OBSTETRICS & GYNECOLOGY

## 2025-07-09 PROCEDURE — 3078F DIAST BP <80 MM HG: CPT | Performed by: OBSTETRICS & GYNECOLOGY

## 2025-07-09 PROCEDURE — 36415 COLL VENOUS BLD VENIPUNCTURE: CPT | Performed by: OBSTETRICS & GYNECOLOGY

## 2025-07-09 PROCEDURE — 85018 HEMOGLOBIN: CPT | Performed by: OBSTETRICS & GYNECOLOGY

## 2025-07-09 PROCEDURE — 0503F POSTPARTUM CARE VISIT: CPT | Performed by: OBSTETRICS & GYNECOLOGY

## 2025-07-09 PROCEDURE — 99207 PR POST PARTUM EXAM: CPT | Performed by: OBSTETRICS & GYNECOLOGY

## 2025-07-09 ASSESSMENT — ANXIETY QUESTIONNAIRES
1. FEELING NERVOUS, ANXIOUS, OR ON EDGE: NOT AT ALL
7. FEELING AFRAID AS IF SOMETHING AWFUL MIGHT HAPPEN: NOT AT ALL
5. BEING SO RESTLESS THAT IT IS HARD TO SIT STILL: NOT AT ALL
IF YOU CHECKED OFF ANY PROBLEMS ON THIS QUESTIONNAIRE, HOW DIFFICULT HAVE THESE PROBLEMS MADE IT FOR YOU TO DO YOUR WORK, TAKE CARE OF THINGS AT HOME, OR GET ALONG WITH OTHER PEOPLE: NOT DIFFICULT AT ALL
GAD7 TOTAL SCORE: 0
GAD7 TOTAL SCORE: 0
3. WORRYING TOO MUCH ABOUT DIFFERENT THINGS: NOT AT ALL
6. BECOMING EASILY ANNOYED OR IRRITABLE: NOT AT ALL
2. NOT BEING ABLE TO STOP OR CONTROL WORRYING: NOT AT ALL

## 2025-07-09 ASSESSMENT — PATIENT HEALTH QUESTIONNAIRE - PHQ9
SUM OF ALL RESPONSES TO PHQ QUESTIONS 1-9: 1
5. POOR APPETITE OR OVEREATING: NOT AT ALL

## 2025-07-09 NOTE — PATIENT INSTRUCTIONS
Follow up with your primary care provider for your other medical problems.  Continue self breast exam.  Increase physical activity and exercise.  Lab results will be called to the patient.  Usual safety and preventative measures counseling done.  Weight loss encouraged.  Last pap smear (2023) was normal and negative for the DNA of high risk HPV subtypes.  No pap was obtained this year.  This was discussed with the patient and she agrees with the plan.  May resume all normal activities.

## 2025-07-18 PROBLEM — N81.89 PELVIC FLOOR WEAKNESS: Status: ACTIVE | Noted: 2025-07-18

## (undated) DEVICE — SOL NACL 0.9% IRRIG 1000ML BOTTLE 2F7124

## (undated) DEVICE — GLOVE PROTEXIS W/NEU-THERA 6.0  2D73TE60

## (undated) DEVICE — SOL WATER IRRIG 1000ML BOTTLE 2F7114

## (undated) DEVICE — SUCTION CANISTER MEDIVAC LINER 3000ML W/LID 65651-530

## (undated) DEVICE — SUCTION CANNULA UTERINE 07MM CVD 022107-10

## (undated) DEVICE — DRSG TELFA ISLAND 4X8" 7541

## (undated) DEVICE — CATH TRAY FOLEY 16FR BARDEX W/DRAIN BAG STATLOCK 300316A

## (undated) DEVICE — SOL NACL 0.9% IRRIG 1000ML BOTTLE 07138-09

## (undated) DEVICE — LINEN C-SECTION 5415

## (undated) DEVICE — PREP CHLORAPREP 26ML TINTED ORANGE  260815

## (undated) DEVICE — LINEN TOWEL PACK X5 5464

## (undated) DEVICE — DRSG TELFA 3X8" 1238

## (undated) DEVICE — SU VICRYL 0 CT 36" J358H

## (undated) DEVICE — DRSG STERI STRIP 1/2X4" R1547

## (undated) DEVICE — SU PDS II 0 CTX 60" Z990G

## (undated) DEVICE — GLOVE PROTEXIS POWDER FREE 7.0 ORTHOPEDIC 2D73ET70

## (undated) DEVICE — SOL ADH LIQUID BENZOIN SWAB 0.6ML C1544

## (undated) DEVICE — PREP DURAPREP 06ML APL 8635

## (undated) DEVICE — TUBING VACUUM COLLECTION 6FT 23116

## (undated) DEVICE — ESU GROUND PAD UNIVERSAL W/O CORD

## (undated) DEVICE — Device

## (undated) DEVICE — SU VICRYL 3-0 SH 27" J316H

## (undated) DEVICE — PACK MINOR SBA15MIFSE

## (undated) DEVICE — DRSG BANDAID 3/4X3"

## (undated) DEVICE — BAG DECANTER STERILE WHITE DYNJDEC09

## (undated) DEVICE — SU MONOCRYL 4-0 PS-2 18" UND Y496G

## (undated) DEVICE — BLADE CLIPPER 4406

## (undated) DEVICE — DRAPE POUCH IRR 1016

## (undated) DEVICE — GLOVE BIOGEL PI MICRO SZ 6.0 48560

## (undated) DEVICE — PACK C-SECTION LF PL15OTA83B

## (undated) DEVICE — SU VICRYL+ 0 27IN CT-2 VLT VCP334H

## (undated) DEVICE — DRAPE MINOR PROCEDURE LAP 29496

## (undated) DEVICE — SU VICRYL 4-0 PS-2 18" UND J496H

## (undated) DEVICE — DRSG GAUZE 4X4" 3033

## (undated) DEVICE — GLOVE BIOGEL PI MICRO INDICATOR UNDERGLOVE SZ 6.5 48965

## (undated) DEVICE — TRAY PREP DRY SKIN SCRUB 067

## (undated) DEVICE — PACK TVT HYSTEROSCOPY SMA15HYFSE

## (undated) DEVICE — ESU PENCIL SMOKE EVAC W/ROCKER SWITCH 0703-047-000

## (undated) RX ORDER — DOXYCYCLINE 100 MG/1
CAPSULE ORAL
Status: DISPENSED
Start: 2024-02-08

## (undated) RX ORDER — OXYTOCIN/0.9 % SODIUM CHLORIDE 30/500 ML
PLASTIC BAG, INJECTION (ML) INTRAVENOUS
Status: DISPENSED
Start: 2021-07-28

## (undated) RX ORDER — KETOROLAC TROMETHAMINE 30 MG/ML
INJECTION, SOLUTION INTRAMUSCULAR; INTRAVENOUS
Status: DISPENSED
Start: 2021-07-28

## (undated) RX ORDER — ACETAMINOPHEN 325 MG/1
TABLET ORAL
Status: DISPENSED
Start: 2024-02-08

## (undated) RX ORDER — CHLOROPROCAINE HYDROCHLORIDE 30 MG/ML
INJECTION, SOLUTION EPIDURAL; INFILTRATION; INTRACAUDAL; PERINEURAL
Status: DISPENSED
Start: 2021-07-28

## (undated) RX ORDER — ONDANSETRON 2 MG/ML
INJECTION INTRAMUSCULAR; INTRAVENOUS
Status: DISPENSED
Start: 2021-07-28

## (undated) RX ORDER — DEXAMETHASONE SODIUM PHOSPHATE 4 MG/ML
INJECTION, SOLUTION INTRA-ARTICULAR; INTRALESIONAL; INTRAMUSCULAR; INTRAVENOUS; SOFT TISSUE
Status: DISPENSED
Start: 2024-02-08

## (undated) RX ORDER — OXYTOCIN/0.9 % SODIUM CHLORIDE 30/500 ML
PLASTIC BAG, INJECTION (ML) INTRAVENOUS
Status: DISPENSED
Start: 2025-05-29

## (undated) RX ORDER — EPHEDRINE SULFATE 50 MG/ML
INJECTION, SOLUTION INTRAMUSCULAR; INTRAVENOUS; SUBCUTANEOUS
Status: DISPENSED
Start: 2025-05-29

## (undated) RX ORDER — FENTANYL CITRATE 50 UG/ML
INJECTION, SOLUTION INTRAMUSCULAR; INTRAVENOUS
Status: DISPENSED
Start: 2024-02-08

## (undated) RX ORDER — MORPHINE SULFATE 1 MG/ML
INJECTION, SOLUTION EPIDURAL; INTRATHECAL; INTRAVENOUS
Status: DISPENSED
Start: 2021-07-28

## (undated) RX ORDER — ONDANSETRON 2 MG/ML
INJECTION INTRAMUSCULAR; INTRAVENOUS
Status: DISPENSED
Start: 2024-02-08

## (undated) RX ORDER — PROPOFOL 10 MG/ML
INJECTION, EMULSION INTRAVENOUS
Status: DISPENSED
Start: 2024-02-08

## (undated) RX ORDER — MORPHINE SULFATE 1 MG/ML
INJECTION, SOLUTION EPIDURAL; INTRATHECAL; INTRAVENOUS
Status: DISPENSED
Start: 2025-05-29